# Patient Record
Sex: MALE | Race: ASIAN | Employment: OTHER | URBAN - METROPOLITAN AREA
[De-identification: names, ages, dates, MRNs, and addresses within clinical notes are randomized per-mention and may not be internally consistent; named-entity substitution may affect disease eponyms.]

---

## 2017-01-03 PROBLEM — R73.03 PRE-DIABETES: Status: ACTIVE | Noted: 2017-01-03

## 2017-01-03 PROBLEM — I51.89 DIASTOLIC DYSFUNCTION: Status: ACTIVE | Noted: 2017-01-03

## 2017-01-03 PROBLEM — E55.9 HYPOVITAMINOSIS D: Status: ACTIVE | Noted: 2017-01-03

## 2017-01-03 PROBLEM — E03.9 HYPOTHYROIDISM: Status: ACTIVE | Noted: 2017-01-03

## 2017-01-03 PROBLEM — I45.10 INCOMPLETE RIGHT BUNDLE BRANCH BLOCK: Status: ACTIVE | Noted: 2017-01-03

## 2017-01-13 RX ORDER — LEVOTHYROXINE SODIUM 112 UG/1
112 TABLET ORAL DAILY
Qty: 90 TABLET | Refills: 3 | Status: SHIPPED | OUTPATIENT
Start: 2017-01-13 | End: 2017-09-19

## 2017-01-13 NOTE — TELEPHONE ENCOUNTER
Thyroid Supplements Protocol Failed1/11 10:56 AM   TSH test in past 12 months    TSH value between 0.350 and 5.500 IU/ml     Requesting Levothyroxine   LOV: 12/9/16  RTC: 3m   Last Labs: 5/19/16 per media, all thyroid levels within normal limits   Filled:

## 2017-02-01 ENCOUNTER — MED REC SCAN ONLY (OUTPATIENT)
Dept: FAMILY MEDICINE CLINIC | Facility: CLINIC | Age: 62
End: 2017-02-01

## 2017-03-07 ENCOUNTER — OFFICE VISIT (OUTPATIENT)
Dept: FAMILY MEDICINE CLINIC | Facility: CLINIC | Age: 62
End: 2017-03-07

## 2017-03-07 VITALS
BODY MASS INDEX: 40.98 KG/M2 | SYSTOLIC BLOOD PRESSURE: 120 MMHG | DIASTOLIC BLOOD PRESSURE: 82 MMHG | RESPIRATION RATE: 16 BRPM | HEIGHT: 66 IN | HEART RATE: 74 BPM | WEIGHT: 255 LBS | TEMPERATURE: 98 F

## 2017-03-07 DIAGNOSIS — K21.9 GASTROESOPHAGEAL REFLUX DISEASE WITHOUT ESOPHAGITIS: ICD-10-CM

## 2017-03-07 DIAGNOSIS — R73.03 PRE-DIABETES: Primary | ICD-10-CM

## 2017-03-07 DIAGNOSIS — E55.9 HYPOVITAMINOSIS D: ICD-10-CM

## 2017-03-07 DIAGNOSIS — E03.9 HYPOTHYROIDISM, UNSPECIFIED TYPE: ICD-10-CM

## 2017-03-07 DIAGNOSIS — K59.00 CONSTIPATION, UNSPECIFIED CONSTIPATION TYPE: ICD-10-CM

## 2017-03-07 PROCEDURE — 99214 OFFICE O/P EST MOD 30 MIN: CPT | Performed by: INTERNAL MEDICINE

## 2017-03-07 RX ORDER — RANITIDINE 150 MG/1
150 TABLET ORAL 2 TIMES DAILY
Qty: 60 TABLET | Refills: 5 | Status: SHIPPED | OUTPATIENT
Start: 2017-03-07 | End: 2020-12-31

## 2017-03-07 NOTE — PATIENT INSTRUCTIONS
Thank you for choosing Lizette Coleman MD at [place] [address]  To Do: Arron Westfall  1.  Follow up for blood work on Friday     • Please signup for [facility] CHART, which is electronic access to your record if you have not done so.  All your results will post allow our office 5 business days to contact you regarding any testing results. Refill policies:   Allow 3 business days for refills; controlled substances may take longer and must be picked up from the office in person.   Narcotic medications can only be

## 2017-03-07 NOTE — PROGRESS NOTES
[facility] Internal Medicine Office Note  Chief Complaint:   Patient presents with:  Lab Results: Pt declined flu shot today  Test Results      HPI:   This is a 62year old male coming in for recheck for Vit D and A1c    He has gas pains on the r Conjunctivae are normal.   Neck: Neck supple. Cardiovascular: Normal rate, regular rhythm and normal heart sounds. Pulmonary/Chest: Effort normal and breath sounds normal.   Neurological: He is alert.        ASSESSMENT AND PLAN:   Sallye Spurling is a Outcome: Patient verbalizes understanding. Patient is notified to call with any questions, complications, allergies, or worsening or changing symptoms. Patient is to call with any side effects or complications from the treatments as a result of today.

## 2017-03-10 ENCOUNTER — APPOINTMENT (OUTPATIENT)
Dept: LAB | Age: 62
End: 2017-03-10
Attending: INTERNAL MEDICINE
Payer: COMMERCIAL

## 2017-03-10 DIAGNOSIS — E03.9 HYPOTHYROIDISM, UNSPECIFIED TYPE: ICD-10-CM

## 2017-03-10 DIAGNOSIS — R73.03 PRE-DIABETES: ICD-10-CM

## 2017-03-10 DIAGNOSIS — E55.9 HYPOVITAMINOSIS D: ICD-10-CM

## 2017-03-10 LAB
25-HYDROXYVITAMIN D (TOTAL): 27.3 NG/ML (ref 30–100)
EST. AVERAGE GLUCOSE BLD GHB EST-MCNC: 123 MG/DL (ref 68–126)
HBA1C MFR BLD HPLC: 5.9 % (ref ?–5.7)
TSI SER-ACNC: 1.64 MIU/ML (ref 0.35–5.5)

## 2017-03-10 PROCEDURE — 36415 COLL VENOUS BLD VENIPUNCTURE: CPT

## 2017-03-10 PROCEDURE — 83036 HEMOGLOBIN GLYCOSYLATED A1C: CPT

## 2017-03-10 PROCEDURE — 84443 ASSAY THYROID STIM HORMONE: CPT

## 2017-03-10 PROCEDURE — 82306 VITAMIN D 25 HYDROXY: CPT

## 2017-03-23 ENCOUNTER — TELEPHONE (OUTPATIENT)
Dept: FAMILY MEDICINE CLINIC | Facility: CLINIC | Age: 62
End: 2017-03-23

## 2017-03-23 NOTE — TELEPHONE ENCOUNTER
Entered by Zana Cardoso MD at 3/11/2017 12:56 PM        Read by Esthela Mccormick at 3/11/2017  9:37 PM       Vitamin D is much improved - take vitamin D 2000 units once a day (is over the counter)   Thyroid is at goal         See results below.  Spoke with

## 2017-05-31 ENCOUNTER — OFFICE VISIT (OUTPATIENT)
Dept: FAMILY MEDICINE CLINIC | Facility: CLINIC | Age: 62
End: 2017-05-31

## 2017-05-31 VITALS
HEIGHT: 66 IN | RESPIRATION RATE: 16 BRPM | OXYGEN SATURATION: 98 % | HEART RATE: 72 BPM | DIASTOLIC BLOOD PRESSURE: 80 MMHG | WEIGHT: 253.19 LBS | BODY MASS INDEX: 40.69 KG/M2 | TEMPERATURE: 98 F | SYSTOLIC BLOOD PRESSURE: 138 MMHG

## 2017-05-31 DIAGNOSIS — R06.89 GASPING FOR BREATH: ICD-10-CM

## 2017-05-31 DIAGNOSIS — R06.81 APNEA: Primary | ICD-10-CM

## 2017-05-31 DIAGNOSIS — Z87.09 HISTORY OF ENLARGED TONSILS: ICD-10-CM

## 2017-05-31 PROCEDURE — 99213 OFFICE O/P EST LOW 20 MIN: CPT | Performed by: NURSE PRACTITIONER

## 2017-05-31 NOTE — PATIENT INSTRUCTIONS
Thank you for choosing SHELTON Acevedo at [place] [address]  To Do: Arron Westfall  1. Start taking steroid (medrol dose pack)--> as directed  2.  Schedule for sleep study and ENT (ear, nose and throat) specialist    • Please signup for MY CHART, strive to make you healthier and to improve your quality of life.     Referrals, and Radiology Information:    If your insurance requires a referral to a specialist, please allow 5 business days to process your referral request.    If SHELTON Arriaga

## 2017-05-31 NOTE — PROGRESS NOTES
[facility] Internal Medicine Office Note  Chief Complaint:   Patient presents with:  Breathing Problem: woke up in the middle of the night not being able to sleep, happen twice in one night.  Episode happened last night, has not happen before - Ox Tab Take 1 tablet (112 mcg total) by mouth daily. Disp: 90 tablet Rfl: 3         REVIEW OF SYSTEMS:   Review of Systems   Constitutional: Negative for fever, chills and fatigue.    HENT: Negative for congestion, ear pain, hoarse voice, postnasal drip, sore Referral - In Network    1. See detailed hpi  2. Medrol dose pack   3.  Referral for sleep study and ENT (had seen specialist at [facility] about 3 yrs ago (will request those medical records today)     No orders of the defined types were placed in this encount

## 2017-08-08 ENCOUNTER — OFFICE VISIT (OUTPATIENT)
Dept: FAMILY MEDICINE CLINIC | Facility: CLINIC | Age: 62
End: 2017-08-08

## 2017-08-08 ENCOUNTER — APPOINTMENT (OUTPATIENT)
Dept: LAB | Age: 62
End: 2017-08-08
Attending: INTERNAL MEDICINE
Payer: COMMERCIAL

## 2017-08-08 VITALS
WEIGHT: 253.31 LBS | DIASTOLIC BLOOD PRESSURE: 70 MMHG | RESPIRATION RATE: 16 BRPM | TEMPERATURE: 99 F | HEART RATE: 78 BPM | BODY MASS INDEX: 40.71 KG/M2 | HEIGHT: 66 IN | SYSTOLIC BLOOD PRESSURE: 122 MMHG

## 2017-08-08 DIAGNOSIS — R10.31 RIGHT LOWER QUADRANT PAIN: ICD-10-CM

## 2017-08-08 DIAGNOSIS — E55.9 HYPOVITAMINOSIS D: ICD-10-CM

## 2017-08-08 DIAGNOSIS — R73.03 PRE-DIABETES: ICD-10-CM

## 2017-08-08 DIAGNOSIS — E53.8 LOW VITAMIN B12 LEVEL: ICD-10-CM

## 2017-08-08 DIAGNOSIS — R42 LIGHTHEADEDNESS: ICD-10-CM

## 2017-08-08 DIAGNOSIS — Z12.5 SPECIAL SCREENING FOR MALIGNANT NEOPLASM OF PROSTATE: ICD-10-CM

## 2017-08-08 DIAGNOSIS — Z12.11 COLON CANCER SCREENING: ICD-10-CM

## 2017-08-08 DIAGNOSIS — E03.9 HYPOTHYROIDISM, UNSPECIFIED TYPE: Primary | ICD-10-CM

## 2017-08-08 DIAGNOSIS — E03.9 HYPOTHYROIDISM, UNSPECIFIED TYPE: ICD-10-CM

## 2017-08-08 LAB
25-HYDROXYVITAMIN D (TOTAL): 19.3 NG/ML (ref 30–100)
EST. AVERAGE GLUCOSE BLD GHB EST-MCNC: 134 MG/DL (ref 68–126)
FREE T4: 1.3 NG/DL (ref 0.9–1.8)
HAV AB SERPL IA-ACNC: 205 PG/ML (ref 193–986)
HBA1C MFR BLD HPLC: 6.3 % (ref ?–5.7)
PSA SERPL-MCNC: 0.87 NG/ML (ref 0.01–4)
TSI SER-ACNC: 1.12 MIU/ML (ref 0.35–5.5)

## 2017-08-08 PROCEDURE — 84153 ASSAY OF PSA TOTAL: CPT

## 2017-08-08 PROCEDURE — 83036 HEMOGLOBIN GLYCOSYLATED A1C: CPT

## 2017-08-08 PROCEDURE — 82607 VITAMIN B-12: CPT

## 2017-08-08 PROCEDURE — 84443 ASSAY THYROID STIM HORMONE: CPT

## 2017-08-08 PROCEDURE — 84439 ASSAY OF FREE THYROXINE: CPT

## 2017-08-08 PROCEDURE — 99214 OFFICE O/P EST MOD 30 MIN: CPT | Performed by: INTERNAL MEDICINE

## 2017-08-08 PROCEDURE — 82306 VITAMIN D 25 HYDROXY: CPT

## 2017-08-08 PROCEDURE — 36415 COLL VENOUS BLD VENIPUNCTURE: CPT

## 2017-08-08 PROCEDURE — 93000 ELECTROCARDIOGRAM COMPLETE: CPT | Performed by: INTERNAL MEDICINE

## 2017-08-08 NOTE — PROGRESS NOTES
[facility] Internal Medicine Office Note  Chief Complaint:   Patient presents with: Insomnia  Abdominal Pain: Right side - last 2 weeks      HPI:   This is a 65year old male coming in for right sided pain of abdomen.    HPI  Present for past 2 w Known Allergies    Current Outpatient Prescriptions:  RaNITidine HCl 150 MG Oral Tab Take 1 tablet (150 mg total) by mouth 2 (two) times daily. Disp: 60 tablet Rfl: 5   Levothyroxine Sodium 112 MCG Oral Tab Take 1 tablet (112 mcg total) by mouth daily.  Dis Lymphadenopathy:     He has no cervical adenopathy. Neurological: He is alert. Psychiatric: He has a normal mood and affect.        ASSESSMENT AND PLAN:   Nitish Amanda is a 65year old male with  Hypothyroidism, unspecified type  (primary encounter Outcome: Patient verbalizes understanding. Patient is notified to call with any questions, complications, allergies, or worsening or changing symptoms. Patient is to call with any side effects or complications from the treatments as a result of today.

## 2017-08-08 NOTE — PATIENT INSTRUCTIONS
Thank you for choosing Rafita Tubbs MD at [place] [address]  To Do: Arron Westfall  1. Blood work today  2. Increase fiber in diet - take daily fiber supplement such as metamucil or benefiber   3. Start probiotic (refrigerated is better)  4.  Call toda result of today's visit.  For your safety, read the entire package insert of all medicines prescribed to you and be aware of all of the risks of treatment even beyond those discussed today.  All therapies have potential risk of harm or side effects or medi

## 2017-08-09 ENCOUNTER — TELEPHONE (OUTPATIENT)
Dept: FAMILY MEDICINE CLINIC | Facility: CLINIC | Age: 62
End: 2017-08-09

## 2017-08-09 RX ORDER — MOMETASONE FUROATE 1 MG/G
CREAM TOPICAL
Qty: 45 G | Refills: 0 | Status: SHIPPED | OUTPATIENT
Start: 2017-08-09 | End: 2018-05-22

## 2017-08-09 RX ORDER — ERGOCALCIFEROL 1.25 MG/1
50000 CAPSULE ORAL WEEKLY
Qty: 12 CAPSULE | Refills: 0 | Status: SHIPPED | OUTPATIENT
Start: 2017-08-09 | End: 2017-09-08

## 2017-08-09 NOTE — TELEPHONE ENCOUNTER
Patient reports he would like a refill on an ointment prescribed previously by previous PCP.  Patient does not have name of medication that he would like prescribed and said he does not think this is necessary information because she spoke with Dr. Ed masters

## 2017-08-09 NOTE — TELEPHONE ENCOUNTER
Prescription sent to pharmacy    Please inform patient that not all blood work comes back next day, but if it does, I do send results to [employer]. As RN explained to him, results cannot be released until MD reviews.      His blood work results are noted in m

## 2017-08-09 NOTE — TELEPHONE ENCOUNTER
Patient informed of MD recommendations, patient verbalized understanding with intent to comply. Offered opportunity to ask questions, all questions were answered.    Appt scheduled for B12 and 3mo f.u with Dr Manpreet Funez  Date Time Provider

## 2017-08-09 NOTE — TELEPHONE ENCOUNTER
Pt called states he was suppose to be prescribed an ointment pt states he checked with pharmacy and one was not sent in

## 2017-08-11 ENCOUNTER — TELEPHONE (OUTPATIENT)
Dept: FAMILY MEDICINE CLINIC | Facility: CLINIC | Age: 62
End: 2017-08-11

## 2017-08-11 DIAGNOSIS — IMO0001 CLASS 3 OBESITY WITH BODY MASS INDEX (BMI) OF 40.0 TO 44.9 IN ADULT, UNSPECIFIED OBESITY TYPE, UNSPECIFIED WHETHER SERIOUS COMORBIDITY PRESENT: ICD-10-CM

## 2017-08-11 DIAGNOSIS — R06.83 SNORING: Primary | ICD-10-CM

## 2017-08-11 PROBLEM — K57.30 DIVERTICULOSIS OF LARGE INTESTINE: Status: ACTIVE | Noted: 2017-08-11

## 2017-08-11 PROBLEM — K64.8 INTERNAL HEMORRHOIDS: Status: ACTIVE | Noted: 2017-08-11

## 2017-08-11 NOTE — TELEPHONE ENCOUNTER
Referral placed. Called center for sleep to obtain fax # [phone]. Form faxed back, confirmation.

## 2017-08-15 NOTE — TELEPHONE ENCOUNTER
Shanae from  office checking on status, states sleep must be entered on  [facility]. com online cannot except a fax for referral

## 2017-08-18 ENCOUNTER — NURSE ONLY (OUTPATIENT)
Dept: FAMILY MEDICINE CLINIC | Facility: CLINIC | Age: 62
End: 2017-08-18

## 2017-08-18 DIAGNOSIS — E53.8 VITAMIN B12 DEFICIENCY: Primary | ICD-10-CM

## 2017-08-18 PROCEDURE — 96372 THER/PROPH/DIAG INJ SC/IM: CPT | Performed by: INTERNAL MEDICINE

## 2017-08-18 RX ORDER — CYANOCOBALAMIN 1000 UG/ML
1000 INJECTION INTRAMUSCULAR; SUBCUTANEOUS ONCE
Status: COMPLETED | OUTPATIENT
Start: 2017-08-18 | End: 2017-08-18

## 2017-08-18 RX ADMIN — CYANOCOBALAMIN 1000 MCG: 1000 INJECTION INTRAMUSCULAR; SUBCUTANEOUS at 10:28:00

## 2017-08-18 NOTE — PROGRESS NOTES
Patient is here for Vitamin B12 injection #1. Given IM to left deltoid without incident. Your vitamin B12 level is low and you should start monthly B12 injections - please call office to set up nurse appointment for this.    Patient scheduled for 1st sh

## 2017-09-08 ENCOUNTER — TELEPHONE (OUTPATIENT)
Dept: FAMILY MEDICINE CLINIC | Facility: CLINIC | Age: 62
End: 2017-09-08

## 2017-09-08 NOTE — TELEPHONE ENCOUNTER
Spoke to pt provided Dr Urvashi Mota recommendations, verbalized understanding with intent to comply. Patient scheduled OV for next week.     Future Appointments  Date Time Provider Lelo Richards   9/13/2017 9:00 AM SHELTON Holguin [facility] [address]

## 2017-09-08 NOTE — TELEPHONE ENCOUNTER
Patient reports similar symptoms from when he came in to see Dr David Bean, it is going towards his lower back, right by his kidney area, pt reports pain varies. Pain gets better when laying down for a couple of mins. Pain: 6/10.  Denies pain when urinating, no

## 2017-09-08 NOTE — TELEPHONE ENCOUNTER
Would treat with high dose anti-inflammatory meds - take 2 naproxen otc twice daily with food.  F/u urgent care if symptoms persist and f/u next week

## 2017-09-12 ENCOUNTER — OFFICE VISIT (OUTPATIENT)
Dept: FAMILY MEDICINE CLINIC | Facility: CLINIC | Age: 62
End: 2017-09-12

## 2017-09-12 VITALS
HEIGHT: 66 IN | HEART RATE: 72 BPM | DIASTOLIC BLOOD PRESSURE: 80 MMHG | WEIGHT: 252 LBS | SYSTOLIC BLOOD PRESSURE: 132 MMHG | RESPIRATION RATE: 16 BRPM | BODY MASS INDEX: 40.5 KG/M2

## 2017-09-12 DIAGNOSIS — E53.8 B12 DEFICIENCY: ICD-10-CM

## 2017-09-12 DIAGNOSIS — M54.50 ACUTE BILATERAL LOW BACK PAIN WITHOUT SCIATICA: Primary | ICD-10-CM

## 2017-09-12 DIAGNOSIS — K40.90 LEFT INGUINAL HERNIA: ICD-10-CM

## 2017-09-12 DIAGNOSIS — R10.30 LOWER ABDOMINAL PAIN: ICD-10-CM

## 2017-09-12 LAB
APPEARANCE: CLEAR
BILIRUBIN: NEGATIVE
GLUCOSE (URINE DIPSTICK): NEGATIVE MG/DL
KETONES (URINE DIPSTICK): NEGATIVE MG/DL
LEUKOCYTES: NEGATIVE
MULTISTIX LOT#: NORMAL NUMERIC
NITRITE, URINE: NEGATIVE
OCCULT BLOOD: NEGATIVE
PH, URINE: 5.5 (ref 4.5–8)
PROTEIN (URINE DIPSTICK): NEGATIVE MG/DL
SPECIFIC GRAVITY: 1.01 (ref 1–1.03)
URINE-COLOR: YELLOW
UROBILINOGEN,SEMI-QN: 0.2 MG/DL (ref 0–1.9)

## 2017-09-12 PROCEDURE — 99213 OFFICE O/P EST LOW 20 MIN: CPT | Performed by: NURSE PRACTITIONER

## 2017-09-12 PROCEDURE — 96372 THER/PROPH/DIAG INJ SC/IM: CPT | Performed by: NURSE PRACTITIONER

## 2017-09-12 PROCEDURE — 87086 URINE CULTURE/COLONY COUNT: CPT | Performed by: NURSE PRACTITIONER

## 2017-09-12 PROCEDURE — 81003 URINALYSIS AUTO W/O SCOPE: CPT | Performed by: NURSE PRACTITIONER

## 2017-09-12 RX ORDER — TRAMADOL HYDROCHLORIDE 50 MG/1
50 TABLET ORAL EVERY 12 HOURS PRN
Qty: 20 TABLET | Refills: 0 | Status: SHIPPED | OUTPATIENT
Start: 2017-09-12 | End: 2017-10-30

## 2017-09-12 RX ORDER — CHOLECALCIFEROL (VITAMIN D3) 1250 MCG
1 CAPSULE ORAL WEEKLY
COMMUNITY
End: 2018-03-20

## 2017-09-12 RX ORDER — CYANOCOBALAMIN 1000 UG/ML
1000 INJECTION INTRAMUSCULAR; SUBCUTANEOUS ONCE
Status: COMPLETED | OUTPATIENT
Start: 2017-09-12 | End: 2017-09-12

## 2017-09-12 RX ORDER — DICLOFENAC SODIUM 75 MG/1
75 TABLET, DELAYED RELEASE ORAL 2 TIMES DAILY
Qty: 60 TABLET | Refills: 0 | Status: SHIPPED | OUTPATIENT
Start: 2017-09-12 | End: 2017-10-30

## 2017-09-12 RX ADMIN — CYANOCOBALAMIN 1000 MCG: 1000 INJECTION INTRAMUSCULAR; SUBCUTANEOUS at 13:14:00

## 2017-09-12 NOTE — PATIENT INSTRUCTIONS
Thank you for choosing SHELTON Wilson at [place] [address]  To Do: Arron Westfall  1.  Can take anti-inflamm (dicfofenac) as needed for pain 2 times per day   - for extreme pain can take tramadol (only 2 times per day)  -can take muscle relaxer a for info    • Please call our office about any questions regarding your treatment/medicines/tests as a result of today's visit.  For your safety, read the entire package insert of all medicines prescribed to you and be aware of all of the risks of treatmen

## 2017-09-12 NOTE — PROGRESS NOTES
[facility] Internal Medicine Office Note  Chief Complaint:   Patient presents with:  Low Back Pain  Imm/Inj: vitamin b12 / declines flu shot    HPI:   This is a 65year old male coming in for  HPI     C.o of back pain X 5 days   Reports that he w Mometasone Furoate 0.1 % External Cream Apply small amount to affected area once daily. Disp: 45 g Rfl: 0   RaNITidine HCl 150 MG Oral Tab Take 1 tablet (150 mg total) by mouth 2 (two) times daily.  Disp: 60 tablet Rfl: 5   Levothyroxine Sodium 112 MCG Oral Difficult exam r/t to body habitus    Musculoskeletal:        Lumbar back: He exhibits decreased range of motion and tenderness. He exhibits no swelling.         Back:    Decrease ROM  +pain w/ palpation at L4/L5   +strength, sensation, DTRs, pulse intact Sig: Take 1 tablet (75 mg total) by mouth 2 (two) times daily. TraMADol HCl 50 MG Oral Tab 20 tablet 0      Sig: Take 1 tablet (50 mg total) by mouth every 12 (twelve) hours as needed for Pain.          Imaging & Consults:  SURGERY - INTERNAL  OP RE

## 2017-09-15 ENCOUNTER — OFFICE VISIT (OUTPATIENT)
Dept: PHYSICAL THERAPY | Age: 62
End: 2017-09-15
Attending: NURSE PRACTITIONER
Payer: COMMERCIAL

## 2017-09-15 DIAGNOSIS — R10.30 LOWER ABDOMINAL PAIN: ICD-10-CM

## 2017-09-15 PROCEDURE — 97161 PT EVAL LOW COMPLEX 20 MIN: CPT | Performed by: PHYSICAL THERAPIST

## 2017-09-15 PROCEDURE — 97140 MANUAL THERAPY 1/> REGIONS: CPT | Performed by: PHYSICAL THERAPIST

## 2017-09-15 PROCEDURE — 97110 THERAPEUTIC EXERCISES: CPT | Performed by: PHYSICAL THERAPIST

## 2017-09-15 NOTE — PROGRESS NOTES
SPINE EVALUATION:   Referring Physician: Sylvan Fleischer APN  Diagnosis: Acute bilateral low back pain without sciatica   Date of Service: 9/15/2017     PATIENT SUMMARY   Charlotte Sanchez is a 65year old y/o male who presents to therapy today with comp Observation/Posture: abdominal obesity, decreased lumbar lordosis,   Gait: upright posture, short stride length, mild antalgia on right  Neuro Screen: Myotomes and dermatomes intact    Lumbar ROM:   Flexion: 75% limitation with pain  Extension: full pain absence of radicular symptoms for 3 consecutive days to improve function with ADL (8 visits)  · Pt will have decreased paraspinal mm tension for improved lumbar mobility to tolerate standing >60 minutes for work and home activities (8 visits)  · Pt will de

## 2017-09-18 ENCOUNTER — TELEPHONE (OUTPATIENT)
Dept: FAMILY MEDICINE CLINIC | Facility: CLINIC | Age: 62
End: 2017-09-18

## 2017-09-18 RX ORDER — METHOCARBAMOL 750 MG/1
TABLET, FILM COATED ORAL
Qty: 20 TABLET | Refills: 0 | Status: SHIPPED | OUTPATIENT
Start: 2017-09-18 | End: 2017-10-30

## 2017-09-18 NOTE — TELEPHONE ENCOUNTER
Dr. David Bean- Patient was seen by Sammy Olivares on 09/12/2017 for back pain. He stated that he is still having back pain and would like to speak with a nurse. Please advise.

## 2017-09-18 NOTE — TELEPHONE ENCOUNTER
Time started: 414    Time ended: 455    Total time spent on chart: 41 min    Patient said Dr. Sheffield Ormond advised getting scan of his hernia before seeing Dr. Juan Hare and he is looking for the muscle relaxer he was suppose to get.   Per Minh Birmingham call in [facility]

## 2017-09-19 ENCOUNTER — APPOINTMENT (OUTPATIENT)
Dept: PHYSICAL THERAPY | Age: 62
End: 2017-09-19
Attending: NURSE PRACTITIONER
Payer: COMMERCIAL

## 2017-09-19 NOTE — TELEPHONE ENCOUNTER
Patient states that he is completely out of his levothyroxine and his mail order pharm is just processing a refill now. It will take 7-14 days for his med to come in the mail. He is asking for us to call in a 1 month supply to his local [facility].     On

## 2017-09-20 RX ORDER — LEVOTHYROXINE SODIUM 112 UG/1
112 TABLET ORAL
Qty: 90 TABLET | Refills: 3 | Status: SHIPPED | OUTPATIENT
Start: 2017-09-20 | End: 2018-03-21

## 2017-09-21 ENCOUNTER — APPOINTMENT (OUTPATIENT)
Dept: PHYSICAL THERAPY | Age: 62
End: 2017-09-21
Attending: NURSE PRACTITIONER
Payer: COMMERCIAL

## 2017-09-25 ENCOUNTER — TELEPHONE (OUTPATIENT)
Dept: FAMILY MEDICINE CLINIC | Facility: CLINIC | Age: 62
End: 2017-09-25

## 2017-09-25 DIAGNOSIS — M54.5 LOW BACK PAIN, UNSPECIFIED BACK PAIN LATERALITY, UNSPECIFIED CHRONICITY, WITH SCIATICA PRESENCE UNSPECIFIED: Primary | ICD-10-CM

## 2017-09-25 NOTE — TELEPHONE ENCOUNTER
Patient reports he was seen for low back pain with Stefano Beach on 9/12/17. Per OV notes:  4. Referral to PT  5.  Diclofenac and tramadol (given for extreme pain)  -     OP REFERRAL TO [facility]      Patient reports PT is not helpful

## 2017-09-26 ENCOUNTER — OFFICE VISIT (OUTPATIENT)
Dept: PHYSICAL THERAPY | Age: 62
End: 2017-09-26
Attending: NURSE PRACTITIONER
Payer: COMMERCIAL

## 2017-09-26 DIAGNOSIS — R10.30 LOWER ABDOMINAL PAIN: ICD-10-CM

## 2017-09-26 PROCEDURE — 97110 THERAPEUTIC EXERCISES: CPT | Performed by: PHYSICAL THERAPIST

## 2017-09-26 PROCEDURE — 97140 MANUAL THERAPY 1/> REGIONS: CPT | Performed by: PHYSICAL THERAPIST

## 2017-09-26 NOTE — PROGRESS NOTES
Dx:  Acute bilateral low back pain without sciatica           Authorized # of Visits:  6         Next MD visit: 10/5              Subjective: Arron returns for second visit. He apologizes for missing all last week.  He states he was in a great deal of pain consecutive days to improve function with ADL (8 visits)  · Pt will have decreased paraspinal mm tension for improved lumbar mobility to tolerate standing >60 minutes for work and home activities (8 visits)  · Pt will demonstrate improved core strength to

## 2017-09-26 NOTE — TELEPHONE ENCOUNTER
Attempted to contact pt to inform of referral being signed, detailed message left for pt, advised to contact us with any questions or concerns.     Time started: 3:13 pm  Time ended: 3:14 pm    Total time spent on chart: 1 min

## 2017-09-27 ENCOUNTER — TELEPHONE (OUTPATIENT)
Dept: FAMILY MEDICINE CLINIC | Facility: CLINIC | Age: 62
End: 2017-09-27

## 2017-09-27 NOTE — TELEPHONE ENCOUNTER
Good Morning,      Per [facility] Dr Jose Russ is not in network with the patient's policy. I've tried multiple chiropractors(Alex,Brenda,Jhoan,and Dwight) and none are in network. Does physician have another recommendation for patient?        Thanks,   Vaishnavi Garland

## 2017-09-27 NOTE — TELEPHONE ENCOUNTER
Patient notified, verbalized understanding. Patient will call and request information for an  In network physician.     Time started: 1026    Time ended: 1030    Total time spent on chart: 4 min

## 2017-09-27 NOTE — TELEPHONE ENCOUNTER
LMOM for patient to call back and discuss.      Time started: 9646    Time ended: 0948    Total time spent on chart: 1 min

## 2017-09-27 NOTE — TELEPHONE ENCOUNTER
Prisca Delong << Less Detail',event)\" href=\"javascript:;\">More Detail >>      Prisca Delong   Sent: Wed September 27, 2017  2:52 PM   To: [facility] Clinical Staff                  Message     Hello,      After calling [facility] again they were able to

## 2017-09-28 ENCOUNTER — APPOINTMENT (OUTPATIENT)
Dept: PHYSICAL THERAPY | Age: 62
End: 2017-09-28
Attending: NURSE PRACTITIONER
Payer: COMMERCIAL

## 2017-10-03 ENCOUNTER — OFFICE VISIT (OUTPATIENT)
Dept: SURGERY | Facility: CLINIC | Age: 62
End: 2017-10-03

## 2017-10-03 VITALS — HEIGHT: 66 IN | WEIGHT: 252 LBS | BODY MASS INDEX: 40.5 KG/M2 | RESPIRATION RATE: 18 BRPM

## 2017-10-03 DIAGNOSIS — R10.31 RLQ ABDOMINAL PAIN: Primary | ICD-10-CM

## 2017-10-03 PROCEDURE — 99243 OFF/OP CNSLTJ NEW/EST LOW 30: CPT | Performed by: SURGERY

## 2017-10-03 NOTE — H&P
New Patient Visit Note       Active Problems      1. RLQ abdominal pain        Chief Complaint   Patient presents with:  Hernia: New patient- hernia consultation. no recent testing. has not had hernia in the past. states is more in the lower abdomen area. Smokeless tobacco: Never Used                        Alcohol use:  No                 Current Outpatient Prescriptions:  Levothyroxine Sodium 112 MCG Oral Tab Take 1 tablet (112 mcg total) by mouth before breakfast. Sulma Hoyos Physical Findings   Resp 18   Ht 66\"   Wt 252 lb   BMI 40.67 kg/m²   Physical Exam   Constitutional: He is oriented to person, place, and time. He appears well-developed and well-nourished. No distress. HENT:   Head: Normocephalic and atraumatic. (around 10/17/2017).     Belen Diana MD

## 2017-10-09 ENCOUNTER — HOSPITAL ENCOUNTER (OUTPATIENT)
Dept: CT IMAGING | Age: 62
Discharge: HOME | End: 2017-10-09
Attending: SURGERY
Payer: COMMERCIAL

## 2017-10-09 DIAGNOSIS — R10.31 RLQ ABDOMINAL PAIN: ICD-10-CM

## 2017-10-09 PROCEDURE — 74176 CT ABD & PELVIS W/O CONTRAST: CPT | Performed by: SURGERY

## 2017-10-10 ENCOUNTER — TELEPHONE (OUTPATIENT)
Dept: SURGERY | Facility: CLINIC | Age: 62
End: 2017-10-10

## 2017-10-10 NOTE — TELEPHONE ENCOUNTER
Notified patient of CT results (hernia) per Dr. Brandt Mcbride. Made appointment for 10/17/17 to further discuss results.

## 2017-10-17 ENCOUNTER — OFFICE VISIT (OUTPATIENT)
Dept: SURGERY | Facility: CLINIC | Age: 62
End: 2017-10-17

## 2017-10-17 VITALS — RESPIRATION RATE: 18 BRPM | WEIGHT: 252 LBS | HEIGHT: 66 IN | BODY MASS INDEX: 40.5 KG/M2

## 2017-10-17 DIAGNOSIS — K40.90 LEFT INGUINAL HERNIA: Primary | ICD-10-CM

## 2017-10-17 PROCEDURE — 99212 OFFICE O/P EST SF 10 MIN: CPT | Performed by: SURGERY

## 2017-10-17 NOTE — PROGRESS NOTES
Follow Up Visit Note       Active Problems      1. Left inguinal hernia          Chief Complaint   Patient presents with: Follow - Up: follow up CT ABDOM[facility]+PELVIS @ [facility]. occasional abdominal pain.          History of Present Illness    The patient presents Smokeless tobacco: Never Used                        Alcohol use:  No                 Current Outpatient Prescriptions:  Levothyroxine Sodium 112 MCG Oral Tab Take 1 tablet (112 mcg total) by mouth before breakfast. Disp: 90 tablet Rfl: 3   methocarbamol 75 Wt 252 lb   BMI 40.67 kg/m²   Physical Exam   No physical examination performed today. This was a counseling session regarding CT scan findings.     CT ABDOMEN+PELVIS(CPT=74176)     COMPARISON:  [facility], CT ABDOMEN+PELVIS KIDNEYSTONE 2D RNDR(NO IV,NO O on 10/09/2017 at 11:09       Approved by: Mark Amin MD              Assessment   Left inguinal hernia  (primary encounter diagnosis)    Plan     · The patient has a small fat-containing umbilical hernia and a small fat-containing left inguinal rusty

## 2017-10-27 ENCOUNTER — TELEPHONE (OUTPATIENT)
Dept: SURGERY | Facility: CLINIC | Age: 62
End: 2017-10-27

## 2017-10-27 DIAGNOSIS — K40.90 NON-RECURRENT UNILATERAL INGUINAL HERNIA WITHOUT OBSTRUCTION OR GANGRENE: Primary | ICD-10-CM

## 2017-10-31 ENCOUNTER — NURSE ONLY (OUTPATIENT)
Dept: FAMILY MEDICINE CLINIC | Facility: CLINIC | Age: 62
End: 2017-10-31

## 2017-10-31 DIAGNOSIS — E53.8 VITAMIN B12 DEFICIENCY: ICD-10-CM

## 2017-10-31 PROCEDURE — 96372 THER/PROPH/DIAG INJ SC/IM: CPT | Performed by: INTERNAL MEDICINE

## 2017-10-31 RX ORDER — CYANOCOBALAMIN 1000 UG/ML
1000 INJECTION INTRAMUSCULAR; SUBCUTANEOUS
Status: SHIPPED | OUTPATIENT
Start: 2017-10-31 | End: 2018-02-28

## 2017-10-31 RX ADMIN — CYANOCOBALAMIN 1000 MCG: 1000 INJECTION INTRAMUSCULAR; SUBCUTANEOUS at 11:16:00

## 2017-10-31 NOTE — PROGRESS NOTES
Notes Recorded by Peter Adhikari MD on 8/9/2017 at 12:37 PM CDT  Your vitamin B12 level is low and you should start monthly B12 injections - please call office to set up nurse appointment for this.     Thyroid results are at goal.    Vitamin D is low and re

## 2017-11-02 ENCOUNTER — OFFICE VISIT (OUTPATIENT)
Dept: FAMILY MEDICINE CLINIC | Facility: CLINIC | Age: 62
End: 2017-11-02

## 2017-11-02 ENCOUNTER — TELEPHONE (OUTPATIENT)
Dept: SURGERY | Facility: CLINIC | Age: 62
End: 2017-11-02

## 2017-11-02 VITALS
WEIGHT: 250 LBS | DIASTOLIC BLOOD PRESSURE: 78 MMHG | SYSTOLIC BLOOD PRESSURE: 124 MMHG | TEMPERATURE: 99 F | RESPIRATION RATE: 16 BRPM | BODY MASS INDEX: 40.18 KG/M2 | HEART RATE: 84 BPM | HEIGHT: 66 IN

## 2017-11-02 DIAGNOSIS — R05.9 COUGH: ICD-10-CM

## 2017-11-02 DIAGNOSIS — R50.9 FEVER, UNSPECIFIED FEVER CAUSE: Primary | ICD-10-CM

## 2017-11-02 DIAGNOSIS — J34.89 SINUS PRESSURE: ICD-10-CM

## 2017-11-02 PROCEDURE — 99213 OFFICE O/P EST LOW 20 MIN: CPT | Performed by: FAMILY MEDICINE

## 2017-11-02 PROCEDURE — 87502 INFLUENZA DNA AMP PROBE: CPT | Performed by: FAMILY MEDICINE

## 2017-11-02 PROCEDURE — 87798 DETECT AGENT NOS DNA AMP: CPT | Performed by: FAMILY MEDICINE

## 2017-11-02 RX ORDER — AMOXICILLIN AND CLAVULANATE POTASSIUM 875; 125 MG/1; MG/1
1 TABLET, FILM COATED ORAL 2 TIMES DAILY
Qty: 20 TABLET | Refills: 0 | Status: SHIPPED | OUTPATIENT
Start: 2017-11-02 | End: 2017-11-12

## 2017-11-02 NOTE — PROGRESS NOTES
[facility] Visit Note  11/2/2017      Subjective:      Patient ID: Miles Hoang is a 65year old male.     Chief Complaint:  Patient presents with:  Cough: x 1 week      HPI:  Miles Hoang is a 65year old male who is being seen today for th Clavulanate 875-125 MG Oral Tab; Take 1 tablet by mouth 2 (two) times daily. Dispense: 20 tablet; Refill: 0    Diff dx: influenza, sinusitis, viral uri, vs other.  -cont delsym, tylenol prn pain/fever, and start augmentin for now.  We will call with result

## 2017-11-03 ENCOUNTER — TELEPHONE (OUTPATIENT)
Dept: SURGERY | Facility: CLINIC | Age: 62
End: 2017-11-03

## 2017-11-03 DIAGNOSIS — K40.90 NON-RECURRENT INGUINAL HERNIA WITHOUT OBSTRUCTION OR GANGRENE, UNSPECIFIED LATERALITY: Primary | ICD-10-CM

## 2017-11-14 ENCOUNTER — HOSPITAL ENCOUNTER (OUTPATIENT)
Dept: GENERAL RADIOLOGY | Age: 62
Discharge: HOME | End: 2017-11-14
Attending: INTERNAL MEDICINE
Payer: COMMERCIAL

## 2017-11-14 ENCOUNTER — OFFICE VISIT (OUTPATIENT)
Dept: FAMILY MEDICINE CLINIC | Facility: CLINIC | Age: 62
End: 2017-11-14

## 2017-11-14 VITALS
BODY MASS INDEX: 41.3 KG/M2 | RESPIRATION RATE: 16 BRPM | TEMPERATURE: 97 F | HEART RATE: 76 BPM | SYSTOLIC BLOOD PRESSURE: 126 MMHG | DIASTOLIC BLOOD PRESSURE: 74 MMHG | HEIGHT: 66 IN | WEIGHT: 257 LBS

## 2017-11-14 DIAGNOSIS — R05.9 COUGH: ICD-10-CM

## 2017-11-14 DIAGNOSIS — R73.03 PRE-DIABETES: Primary | ICD-10-CM

## 2017-11-14 DIAGNOSIS — E03.9 HYPOTHYROIDISM, UNSPECIFIED TYPE: ICD-10-CM

## 2017-11-14 DIAGNOSIS — E55.9 HYPOVITAMINOSIS D: ICD-10-CM

## 2017-11-14 DIAGNOSIS — E53.8 VITAMIN B12 DEFICIENCY: ICD-10-CM

## 2017-11-14 PROCEDURE — 83036 HEMOGLOBIN GLYCOSYLATED A1C: CPT | Performed by: INTERNAL MEDICINE

## 2017-11-14 PROCEDURE — 99214 OFFICE O/P EST MOD 30 MIN: CPT | Performed by: INTERNAL MEDICINE

## 2017-11-14 PROCEDURE — 71020 XR CHEST PA + LAT CHEST (CPT=71020): CPT | Performed by: INTERNAL MEDICINE

## 2017-11-14 NOTE — PATIENT INSTRUCTIONS
Thank you for choosing Gilberto Santos MD at [place] [address]  To Do: Arron Westfall  1. Chest X-ray today  Delsym and mucinex ok for cough  2. Check vitamin D blood work  3.  Decrease carbohydrates and sugar in diet - decrease bread, rice, pasta, potato treatment/medicines/tests as a result of today's visit.  For your safety, read the entire package insert of all medicines prescribed to you and be aware of all of the risks of treatment even beyond those discussed today.  All therapies have potential risk

## 2017-11-14 NOTE — PROGRESS NOTES
[facility] Internal Medicine Office Note  Chief Complaint:   Patient presents with:  Cough: x 2 weeks  Lab: due for Hgb A1C today      HPI:   This is a 65year old male coming in for cough  HPI  Present for the past few weeks  Took course of augconstantin (Patient taking differently: as needed. Apply small amount to affected area once daily. ) Disp: 45 g Rfl: 0   RaNITidine HCl 150 MG Oral Tab Take 1 tablet (150 mg total) by mouth 2 (two) times daily.  (Patient taking differently: Take 150 mg by mouth as nee this visit:    Pre-diabetes - check a1c. Decrease carbohydrates in diet  -     HEMOGLOBIN A1C    Cough - c/w viral syndrome; will check chest x-ray to r/o pna. Delsym/mucinex  -     XR CHEST PA + LAT CHEST (CPT=71020);  Future    Vitamin B12 deficiency - co

## 2017-11-15 ENCOUNTER — APPOINTMENT (OUTPATIENT)
Dept: LAB | Age: 62
End: 2017-11-15
Attending: INTERNAL MEDICINE
Payer: COMMERCIAL

## 2017-11-15 DIAGNOSIS — E55.9 HYPOVITAMINOSIS D: ICD-10-CM

## 2017-11-15 PROCEDURE — 82306 VITAMIN D 25 HYDROXY: CPT

## 2017-11-15 PROCEDURE — 36415 COLL VENOUS BLD VENIPUNCTURE: CPT

## 2017-12-05 ENCOUNTER — NURSE ONLY (OUTPATIENT)
Dept: FAMILY MEDICINE CLINIC | Facility: CLINIC | Age: 62
End: 2017-12-05

## 2017-12-05 ENCOUNTER — TELEPHONE (OUTPATIENT)
Dept: FAMILY MEDICINE CLINIC | Facility: CLINIC | Age: 62
End: 2017-12-05

## 2017-12-05 DIAGNOSIS — E53.8 B12 DEFICIENCY: ICD-10-CM

## 2017-12-05 PROCEDURE — 96372 THER/PROPH/DIAG INJ SC/IM: CPT | Performed by: INTERNAL MEDICINE

## 2017-12-05 RX ADMIN — CYANOCOBALAMIN 1000 MCG: 1000 INJECTION INTRAMUSCULAR; SUBCUTANEOUS at 11:17:00

## 2017-12-05 NOTE — TELEPHONE ENCOUNTER
Patient was here for his Vitamin B12 shot and wanted to discuss a problem he is having. He is scheduled for right inguinal hernia repair soon but noticed a lump in the RUQ that is palpable and painful. He notices it after sitting for a while.   If the are

## 2017-12-05 NOTE — TELEPHONE ENCOUNTER
It may be related to the hernia if it is on the same side, but impossible to say without looking at it.   If the skin is red or has rash, this should be looked at urgently

## 2017-12-05 NOTE — TELEPHONE ENCOUNTER
Pt stated that the lump is palpable only after a long day at work. Denies redness, rash, abdominal pain, but states that when lump appears he feels a burning sensation. I offered pt an appointment to be seen today and tomorrow with Rolf Bradley.  Pt st

## 2017-12-07 ENCOUNTER — HOSPITAL ENCOUNTER (OUTPATIENT)
Facility: HOSPITAL | Age: 62
Setting detail: HOSPITAL OUTPATIENT SURGERY
Discharge: HOME | End: 2017-12-07
Attending: SURGERY | Admitting: SURGERY
Payer: COMMERCIAL

## 2017-12-07 ENCOUNTER — SURGERY (OUTPATIENT)
Age: 62
End: 2017-12-07

## 2017-12-07 ENCOUNTER — ANESTHESIA EVENT (OUTPATIENT)
Dept: SURGERY | Facility: HOSPITAL | Age: 62
End: 2017-12-07
Payer: COMMERCIAL

## 2017-12-07 ENCOUNTER — ANESTHESIA (OUTPATIENT)
Dept: SURGERY | Facility: HOSPITAL | Age: 62
End: 2017-12-07
Payer: COMMERCIAL

## 2017-12-07 VITALS
OXYGEN SATURATION: 99 % | WEIGHT: 250 LBS | HEIGHT: 66 IN | HEART RATE: 79 BPM | TEMPERATURE: 99 F | SYSTOLIC BLOOD PRESSURE: 126 MMHG | DIASTOLIC BLOOD PRESSURE: 89 MMHG | BODY MASS INDEX: 40.18 KG/M2 | RESPIRATION RATE: 18 BRPM

## 2017-12-07 DIAGNOSIS — K40.90 NON-RECURRENT UNILATERAL INGUINAL HERNIA WITHOUT OBSTRUCTION OR GANGRENE: ICD-10-CM

## 2017-12-07 PROCEDURE — 0YUA4JZ SUPPLEMENT BILATERAL INGUINAL REGION WITH SYNTHETIC SUBSTITUTE, PERCUTANEOUS ENDOSCOPIC APPROACH: ICD-10-PCS | Performed by: SURGERY

## 2017-12-07 PROCEDURE — 0WQF0ZZ REPAIR ABDOMINAL WALL, OPEN APPROACH: ICD-10-PCS | Performed by: SURGERY

## 2017-12-07 PROCEDURE — 8E0W4CZ ROBOTIC ASSISTED PROCEDURE OF TRUNK REGION, PERCUTANEOUS ENDOSCOPIC APPROACH: ICD-10-PCS | Performed by: SURGERY

## 2017-12-07 DEVICE — PROGRIP MESH: Type: IMPLANTABLE DEVICE | Site: GROIN | Status: FUNCTIONAL

## 2017-12-07 RX ORDER — HEPARIN SODIUM 5000 [USP'U]/ML
5000 INJECTION, SOLUTION INTRAVENOUS; SUBCUTANEOUS ONCE
Status: COMPLETED | OUTPATIENT
Start: 2017-12-07 | End: 2017-12-07

## 2017-12-07 RX ORDER — HYDROCODONE BITARTRATE AND ACETAMINOPHEN 5; 325 MG/1; MG/1
1-2 TABLET ORAL
Qty: 30 TABLET | Refills: 0 | Status: SHIPPED | OUTPATIENT
Start: 2017-12-07 | End: 2017-12-12

## 2017-12-07 RX ORDER — METOCLOPRAMIDE HYDROCHLORIDE 5 MG/ML
10 INJECTION INTRAMUSCULAR; INTRAVENOUS AS NEEDED
Status: DISCONTINUED | OUTPATIENT
Start: 2017-12-07 | End: 2017-12-07

## 2017-12-07 RX ORDER — HYDROMORPHONE HYDROCHLORIDE 1 MG/ML
0.4 INJECTION, SOLUTION INTRAMUSCULAR; INTRAVENOUS; SUBCUTANEOUS EVERY 5 MIN PRN
Status: DISCONTINUED | OUTPATIENT
Start: 2017-12-07 | End: 2017-12-07

## 2017-12-07 RX ORDER — ASPIRIN 81 MG
100 TABLET, DELAYED RELEASE (ENTERIC COATED) ORAL DAILY
Qty: 10 TABLET | Refills: 0 | Status: SHIPPED | OUTPATIENT
Start: 2017-12-07

## 2017-12-07 RX ORDER — BUPIVACAINE HYDROCHLORIDE AND EPINEPHRINE 2.5; 5 MG/ML; UG/ML
INJECTION, SOLUTION EPIDURAL; INFILTRATION; INTRACAUDAL; PERINEURAL AS NEEDED
Status: DISCONTINUED | OUTPATIENT
Start: 2017-12-07 | End: 2017-12-07 | Stop reason: HOSPADM

## 2017-12-07 RX ORDER — NALOXONE HYDROCHLORIDE 0.4 MG/ML
80 INJECTION, SOLUTION INTRAMUSCULAR; INTRAVENOUS; SUBCUTANEOUS AS NEEDED
Status: DISCONTINUED | OUTPATIENT
Start: 2017-12-07 | End: 2017-12-07

## 2017-12-07 RX ORDER — CEFAZOLIN SODIUM 1 G/3ML
INJECTION, POWDER, FOR SOLUTION INTRAMUSCULAR; INTRAVENOUS
Status: DISCONTINUED | OUTPATIENT
Start: 2017-12-07 | End: 2017-12-07 | Stop reason: HOSPADM

## 2017-12-07 RX ORDER — ONDANSETRON 2 MG/ML
4 INJECTION INTRAMUSCULAR; INTRAVENOUS AS NEEDED
Status: DISCONTINUED | OUTPATIENT
Start: 2017-12-07 | End: 2017-12-07

## 2017-12-07 RX ORDER — HYDROCODONE BITARTRATE AND ACETAMINOPHEN 5; 325 MG/1; MG/1
1 TABLET ORAL AS NEEDED
Status: COMPLETED | OUTPATIENT
Start: 2017-12-07 | End: 2017-12-07

## 2017-12-07 RX ORDER — HYDROCODONE BITARTRATE AND ACETAMINOPHEN 5; 325 MG/1; MG/1
2 TABLET ORAL AS NEEDED
Status: COMPLETED | OUTPATIENT
Start: 2017-12-07 | End: 2017-12-07

## 2017-12-07 RX ORDER — SODIUM CHLORIDE, SODIUM LACTATE, POTASSIUM CHLORIDE, CALCIUM CHLORIDE 600; 310; 30; 20 MG/100ML; MG/100ML; MG/100ML; MG/100ML
INJECTION, SOLUTION INTRAVENOUS CONTINUOUS
Status: DISCONTINUED | OUTPATIENT
Start: 2017-12-07 | End: 2017-12-07

## 2017-12-07 RX ORDER — HYDROMORPHONE HYDROCHLORIDE 1 MG/ML
INJECTION, SOLUTION INTRAMUSCULAR; INTRAVENOUS; SUBCUTANEOUS
Status: COMPLETED
Start: 2017-12-07 | End: 2017-12-07

## 2017-12-07 RX ORDER — CEFAZOLIN SODIUM/WATER 2 G/20 ML
2 SYRINGE (ML) INTRAVENOUS ONCE
Status: DISCONTINUED | OUTPATIENT
Start: 2017-12-07 | End: 2017-12-07 | Stop reason: HOSPADM

## 2017-12-07 NOTE — H&P
Active Problems      1. Left inguinal hernia          Chief Complaint   Patient presents with: Follow - Up: follow up CT ABDOMEN+PELVIS @ [facility].  occasional abdominal pain.            History of Present Illness     The patient presents for follow-up care and Smokeless tobacco: Never Used                        Alcohol use:  No                  Current Outpatient Prescriptions:  Levothyroxine Sodium 112 MCG Oral Tab Take 1 tablet (112 mcg total) by mouth before breakfast. Disp: 90 tablet Rfl: 3   methocarbamol 7 66\"   Wt 252 lb   BMI 40.67 kg/m²   Physical Exam   No physical examination performed today.   This was a counseling session regarding CT scan findings.     CT ABDOMEN+PELVIS(CPT=74176)     COMPARISON:  [facility], CT ABDOMEN+PELVIS KIDNEYSTONE 2D RNDR(NO Julian García MD on 10/09/2017 at 11:09       Approved by: Zora Marie MD             Assessment   Left inguinal hernia  (primary encounter diagnosis)     Plan      · The patient has a small fat-containing umbilical hernia and a small fat-containing left in

## 2017-12-07 NOTE — ANESTHESIA POSTPROCEDURE EVALUATION
Avinash Troncoso 47 Patient Status:  Hospital Outpatient Surgery   Age/Gender 65year old male MRN [id number]   [facility] SURGERY Attending Viktoriya Tom MD   Hosp Day # 0 PCP Seng Bailey MD       Anesthesia Post-op Note

## 2017-12-07 NOTE — ANESTHESIA PREPROCEDURE EVALUATION
PRE-OP EVALUATION    Patient Name: Vivek Starr    Pre-op Diagnosis: Non-recurrent unilateral inguinal hernia without obstruction or gangrene [K40.90]    Procedure(s):  ROBOTIC ASSISTED LAPAROSCOPIC LEFT INGUINAL HERNIA REPAIR AND UMBILICAL Mary Jane Daniela notable dental history. Pulmonary    Pulmonary exam normal.                 Other findings            ASA: 3   Plan: general  NPO status verified and     Post-procedure pain management plan discussed with surgeon and patient.       Plan/risks discus

## 2017-12-07 NOTE — BRIEF OP NOTE
Pre-Operative Diagnosis: Non-recurrent unilateral inguinal hernia without obstruction or gangrene [K40.90]     Post-Operative Diagnosis: Non-recurrent bilateral inguinal hernia without obstruction or gangrene , umbilical hernia     Procedure Performed:

## 2017-12-07 NOTE — OPERATIVE REPORT
OPERATIVE REPORT    PREOPERATIVE DIAGNOSIS:  Left inguinal hernia. Umbilical hernia. POSTOPERATIVE DIAGNOSIS:  Bilateral Pantaloon inguinal hernia.  (Direct and indirect)  Umbilical hernia  PROCEDURE PERFORMED: Robotic bilateral inguinal hernia repair wit satisfaction after which the patient provided willing and informed consent to proceed with surgery.     PROCEDURE: The patient was brought to the operating room, and after induction of general endotracheal anesthesia, the abdomen was prepped and draped in u well as the trocars. The umbilical hernia was repaired with interrupted 0-Ethibond suture. All wounds were cleansed and irrigated. The skin incisions were reapproximated using running subcuticular 4-0 Monocryl suture. Local anesthetic was injected.   Steri

## 2017-12-08 ENCOUNTER — HOSPITAL ENCOUNTER (EMERGENCY)
Age: 62
Discharge: HOME | End: 2017-12-08
Attending: EMERGENCY MEDICINE
Payer: COMMERCIAL

## 2017-12-08 ENCOUNTER — TELEPHONE (OUTPATIENT)
Dept: SURGERY | Facility: CLINIC | Age: 62
End: 2017-12-08

## 2017-12-08 VITALS
BODY MASS INDEX: 40.18 KG/M2 | SYSTOLIC BLOOD PRESSURE: 150 MMHG | HEIGHT: 66 IN | WEIGHT: 250 LBS | DIASTOLIC BLOOD PRESSURE: 71 MMHG | RESPIRATION RATE: 20 BRPM | HEART RATE: 78 BPM | OXYGEN SATURATION: 98 %

## 2017-12-08 DIAGNOSIS — R33.9 URINARY RETENTION: Primary | ICD-10-CM

## 2017-12-08 PROCEDURE — 81003 URINALYSIS AUTO W/O SCOPE: CPT | Performed by: EMERGENCY MEDICINE

## 2017-12-08 PROCEDURE — 99283 EMERGENCY DEPT VISIT LOW MDM: CPT

## 2017-12-08 RX ORDER — LIDOCAINE HYDROCHLORIDE 20 MG/ML
10 JELLY TOPICAL ONCE
Status: COMPLETED | OUTPATIENT
Start: 2017-12-08 | End: 2017-12-08

## 2017-12-08 RX ORDER — TAMSULOSIN HYDROCHLORIDE 0.4 MG/1
0.4 CAPSULE ORAL DAILY
Qty: 7 CAPSULE | Refills: 0 | Status: SHIPPED | OUTPATIENT
Start: 2017-12-08 | End: 2017-12-15

## 2017-12-08 NOTE — OR NURSING
Pt called with multiple questions about his post op pain, what can he take for constipation, when will the burning end w/ urination, why can't he sleep good, can he take his own Mag Citrate and can he take his own tramadolol versus the norco script that wa

## 2017-12-08 NOTE — TELEPHONE ENCOUNTER
On 12/7/2018 patient had Robotic bilateral inguinal hernia repair with mesh ( ProGrip mesh used); Umbilical herniorrhaphy per Dr. Raheel Velez. Patient went to ER during the night due to inability to void.  In the ER a a straight urinary catheter was placed, ap

## 2017-12-08 NOTE — ED PROVIDER NOTES
Patient Seen in: THE [facility] In [place]    History   Patient presents with:  Postop/Procedure Problem  Retention    Stated Complaint: post op unable to urinate     HPI    Bilateral inguinal hernia and umbilical hernia repair earlier to drained. Patient did feel much better after this. Juarez removed and the patient discharged with 1 week of Flomax. Follow-up if further problems occur.           Disposition and Plan     Clinical Impression:  Urinary retention  (primary encounter diagnosi

## 2017-12-12 ENCOUNTER — OFFICE VISIT (OUTPATIENT)
Dept: SURGERY | Facility: CLINIC | Age: 62
End: 2017-12-12

## 2017-12-12 VITALS
SYSTOLIC BLOOD PRESSURE: 122 MMHG | WEIGHT: 248 LBS | TEMPERATURE: 98 F | HEIGHT: 66 IN | DIASTOLIC BLOOD PRESSURE: 81 MMHG | BODY MASS INDEX: 39.86 KG/M2 | HEART RATE: 80 BPM

## 2017-12-12 DIAGNOSIS — K40.20 BILATERAL INGUINAL HERNIA WITHOUT OBSTRUCTION OR GANGRENE, RECURRENCE NOT SPECIFIED: Primary | ICD-10-CM

## 2017-12-12 DIAGNOSIS — K42.9 UMBILICAL HERNIA WITHOUT OBSTRUCTION AND WITHOUT GANGRENE: ICD-10-CM

## 2017-12-12 PROBLEM — K40.90 LEFT INGUINAL HERNIA: Status: RESOLVED | Noted: 2017-10-17 | Resolved: 2017-12-12

## 2017-12-12 PROCEDURE — 99024 POSTOP FOLLOW-UP VISIT: CPT | Performed by: PHYSICIAN ASSISTANT

## 2017-12-12 NOTE — PROGRESS NOTES
Post Operative Visit Note       Active Problems  1. Bilateral inguinal hernia without obstruction or gangrene, recurrence not specified    2.  Umbilical hernia without obstruction and without gangrene         Chief Complaint   Patient presents with:  Post-O Relation Age of Onset   • Diabetes Father    • Diabetes Mother      Social History    Marital status:              Spouse name:                       Years of education:                 Number of children:               Social History Main Topics for abdominal pain. Negative for abdominal distention, anal bleeding, blood in stool, constipation, diarrhea, nausea and vomiting. Genitourinary: Negative for difficulty urinating, dysuria, frequency and urgency.    Musculoskeletal: Negative for arthralgi without obstruction and without gangrene      Plan   The patient is recovering well following surgery. We reviewed his procedure and appropriate aftercare in detail at today's visit.     The patient was found to have bilateral inguinal hernias, both direct

## 2017-12-19 ENCOUNTER — OFFICE VISIT (OUTPATIENT)
Dept: SURGERY | Facility: CLINIC | Age: 62
End: 2017-12-19

## 2017-12-19 VITALS
DIASTOLIC BLOOD PRESSURE: 60 MMHG | RESPIRATION RATE: 16 BRPM | OXYGEN SATURATION: 98 % | HEIGHT: 66 IN | HEART RATE: 85 BPM | WEIGHT: 248 LBS | SYSTOLIC BLOOD PRESSURE: 130 MMHG | BODY MASS INDEX: 39.86 KG/M2

## 2017-12-19 DIAGNOSIS — K40.20 BILATERAL INGUINAL HERNIA WITHOUT OBSTRUCTION OR GANGRENE, RECURRENCE NOT SPECIFIED: Primary | ICD-10-CM

## 2017-12-19 PROCEDURE — 99024 POSTOP FOLLOW-UP VISIT: CPT | Performed by: SURGERY

## 2017-12-19 NOTE — PROGRESS NOTES
Post Operative Visit Note       Active Problems  No diagnosis found. Chief Complaint   Patient presents with:  Post-Op: 2nd P/O s/p bilat ING hernia and umbilical hernia repair on 12/7.          History of Present Illness     Patient presents for contin Used                        Alcohol use: No              Drug use: No                 Current Outpatient Prescriptions:  docusate sodium 100 MG Oral Tab Take 1 tablet (100 mg total) by mouth daily.  Disp: 10 tablet Rfl: 0   Dextromethorphan-Guaifenesin (MUC problems and sleep disturbance. Physical Findings   There were no vitals taken for this visit. Physical Exam   Abdominal: Soft. Bowel sounds are normal. There is no hepatosplenomegaly. There is no tenderness. There is no CVA tenderness. No hernia.  H

## 2018-02-02 RX ORDER — ERGOCALCIFEROL 1.25 MG/1
CAPSULE ORAL
Qty: 12 CAPSULE | OUTPATIENT
Start: 2018-02-02

## 2018-02-02 NOTE — TELEPHONE ENCOUNTER
Levothyroxine 112 mcg 1 tab daily filled 9-20-17 90 with 3 refill  Levothyroxine was send to [facility] on 9-20-17 90 with 3 refills     LVM to call office does he want us to cancel at [facility] and send to mail order please let us know     Lacie sheffield 50,000    2

## 2018-02-05 RX ORDER — LEVOTHYROXINE SODIUM 112 UG/1
112 TABLET ORAL
Qty: 90 TABLET | Refills: 3 | OUTPATIENT
Start: 2018-02-05

## 2018-02-05 NOTE — TELEPHONE ENCOUNTER
From: Blessing Villela  Sent: 2/2/2018 5:23 PM CST  Subject: Medication Renewal Request    Arron Gomes would like a refill of the following medications:     Levothyroxine Sodium 112 MCG Oral Tab Álvaro Mcclelland MD]   Patient Comment: needs refill at [facility]

## 2018-02-06 RX ORDER — LEVOTHYROXINE SODIUM 112 UG/1
TABLET ORAL
Qty: 90 TABLET | Refills: 0 | OUTPATIENT
Start: 2018-02-06

## 2018-03-13 RX ORDER — ERGOCALCIFEROL 1.25 MG/1
CAPSULE ORAL
Qty: 12 CAPSULE | OUTPATIENT
Start: 2018-03-13

## 2018-03-19 NOTE — TELEPHONE ENCOUNTER
Refill request: Vitamin D    Failed protocol    Last appt: 11/14/17 RTC in 3 months  Next appt: 3/20/18  Last refill: 12/10/16 12 capsules    Last labs: 11/15/17 Vitamin D - advised to take 1000 OTC daily.    8/8/17 Vitamin D level was low and MD advised sh

## 2018-03-20 ENCOUNTER — OFFICE VISIT (OUTPATIENT)
Dept: INTERNAL MEDICINE CLINIC | Facility: CLINIC | Age: 63
End: 2018-03-20

## 2018-03-20 ENCOUNTER — APPOINTMENT (OUTPATIENT)
Dept: LAB | Age: 63
End: 2018-03-20
Attending: INTERNAL MEDICINE
Payer: COMMERCIAL

## 2018-03-20 VITALS
DIASTOLIC BLOOD PRESSURE: 84 MMHG | SYSTOLIC BLOOD PRESSURE: 124 MMHG | HEIGHT: 66 IN | HEART RATE: 70 BPM | WEIGHT: 257 LBS | BODY MASS INDEX: 41.3 KG/M2 | RESPIRATION RATE: 16 BRPM

## 2018-03-20 DIAGNOSIS — E53.8 B12 DEFICIENCY: ICD-10-CM

## 2018-03-20 DIAGNOSIS — M54.50 CHRONIC BILATERAL LOW BACK PAIN WITHOUT SCIATICA: ICD-10-CM

## 2018-03-20 DIAGNOSIS — G89.29 CHRONIC BILATERAL LOW BACK PAIN WITHOUT SCIATICA: ICD-10-CM

## 2018-03-20 DIAGNOSIS — E55.9 HYPOVITAMINOSIS D: ICD-10-CM

## 2018-03-20 DIAGNOSIS — E78.1 HYPERTRIGLYCERIDEMIA: ICD-10-CM

## 2018-03-20 DIAGNOSIS — E03.9 HYPOTHYROIDISM, UNSPECIFIED TYPE: ICD-10-CM

## 2018-03-20 DIAGNOSIS — R73.03 PRE-DIABETES: Primary | ICD-10-CM

## 2018-03-20 DIAGNOSIS — R73.03 PRE-DIABETES: ICD-10-CM

## 2018-03-20 LAB
25-HYDROXYVITAMIN D (TOTAL): 44 NG/ML (ref 30–100)
ALBUMIN SERPL-MCNC: 3.9 G/DL (ref 3.5–4.8)
ALP LIVER SERPL-CCNC: 87 U/L (ref 45–117)
ALT SERPL-CCNC: 18 U/L (ref 17–63)
AST SERPL-CCNC: 15 U/L (ref 15–41)
BILIRUB SERPL-MCNC: 0.6 MG/DL (ref 0.1–2)
BUN BLD-MCNC: 13 MG/DL (ref 8–20)
CALCIUM BLD-MCNC: 9.2 MG/DL (ref 8.3–10.3)
CARTRIDGE LOT#: 799 NUMERIC
CHLORIDE: 103 MMOL/L (ref 101–111)
CHOLEST SMN-MCNC: 184 MG/DL (ref ?–200)
CO2: 27 MMOL/L (ref 22–32)
CREAT BLD-MCNC: 1.08 MG/DL (ref 0.7–1.3)
CREAT UR-SCNC: 110 MG/DL
FREE T4: 1.2 NG/DL (ref 0.9–1.8)
GLUCOSE BLD-MCNC: 104 MG/DL (ref 70–99)
HAV AB SERPL IA-ACNC: 306 PG/ML (ref 193–986)
HDLC SERPL-MCNC: 36 MG/DL (ref 45–?)
HDLC SERPL: 5.11 {RATIO} (ref ?–4.97)
HEMOGLOBIN A1C: 6.3 % (ref 4.3–5.6)
LDLC SERPL CALC-MCNC: 124 MG/DL (ref ?–130)
M PROTEIN MFR SERPL ELPH: 7.8 G/DL (ref 6.1–8.3)
MICROALBUMIN UR-MCNC: 0.83 MG/DL
MICROALBUMIN/CREAT 24H UR-RTO: 7.5 UG/MG (ref ?–30)
NONHDLC SERPL-MCNC: 148 MG/DL (ref ?–130)
POTASSIUM SERPL-SCNC: 4.4 MMOL/L (ref 3.6–5.1)
SODIUM SERPL-SCNC: 135 MMOL/L (ref 136–144)
TRIGL SERPL-MCNC: 121 MG/DL (ref ?–150)
TSI SER-ACNC: 3.38 MIU/ML (ref 0.35–5.5)
VLDLC SERPL CALC-MCNC: 24 MG/DL (ref 5–40)

## 2018-03-20 PROCEDURE — 99214 OFFICE O/P EST MOD 30 MIN: CPT | Performed by: INTERNAL MEDICINE

## 2018-03-20 PROCEDURE — 83036 HEMOGLOBIN GLYCOSYLATED A1C: CPT | Performed by: INTERNAL MEDICINE

## 2018-03-20 PROCEDURE — 82607 VITAMIN B-12: CPT

## 2018-03-20 PROCEDURE — 82043 UR ALBUMIN QUANTITATIVE: CPT

## 2018-03-20 PROCEDURE — 36415 COLL VENOUS BLD VENIPUNCTURE: CPT

## 2018-03-20 PROCEDURE — 82306 VITAMIN D 25 HYDROXY: CPT

## 2018-03-20 PROCEDURE — 84439 ASSAY OF FREE THYROXINE: CPT

## 2018-03-20 PROCEDURE — 84443 ASSAY THYROID STIM HORMONE: CPT

## 2018-03-20 PROCEDURE — 82570 ASSAY OF URINE CREATININE: CPT

## 2018-03-20 PROCEDURE — 80053 COMPREHEN METABOLIC PANEL: CPT

## 2018-03-20 PROCEDURE — 80061 LIPID PANEL: CPT

## 2018-03-20 PROCEDURE — 96372 THER/PROPH/DIAG INJ SC/IM: CPT | Performed by: INTERNAL MEDICINE

## 2018-03-20 RX ORDER — CYANOCOBALAMIN 1000 UG/ML
1000 INJECTION INTRAMUSCULAR; SUBCUTANEOUS ONCE
Status: COMPLETED | OUTPATIENT
Start: 2018-03-20 | End: 2018-03-20

## 2018-03-20 RX ORDER — NAPROXEN 500 MG/1
500 TABLET ORAL 2 TIMES DAILY WITH MEALS
Qty: 20 TABLET | Refills: 0 | Status: SHIPPED | OUTPATIENT
Start: 2018-03-20 | End: 2018-09-07 | Stop reason: ALTCHOICE

## 2018-03-20 RX ADMIN — CYANOCOBALAMIN 1000 MCG: 1000 INJECTION INTRAMUSCULAR; SUBCUTANEOUS at 12:07:00

## 2018-03-20 NOTE — PROGRESS NOTES
[facility] Internal Medicine Office Note  Chief Complaint:   Patient presents with:  Diabetes  Abdominal Pain: RLQ x 1 month      HPI:   This is a 65year old male coming in for several concerns   HPI  Back pain starts 2/10 in morning and gets pa tablet (150 mg total) by mouth 2 (two) times daily. (Patient taking differently: Take 150 mg by mouth as needed.  ) Disp: 60 tablet Rfl: 5         REVIEW OF SYSTEMS:   Review of Systems   Constitutional: Negative for fever.    Eyes: Negative for visual dist pain.    Diagnoses and all orders for this visit:    Pre-diabetes - diet controlled. a1c ok at 6.3. Weight loss. Consider metformin.  -     HEMOGLOBIN A1C  -     MICROALB/CREAT RATIO, RANDOM URINE;  Future    Hypertriglyceridemia - repeat lipid panel   - levothyroxine to [facility] on [address] after results received

## 2018-03-21 ENCOUNTER — TELEPHONE (OUTPATIENT)
Dept: INTERNAL MEDICINE CLINIC | Facility: CLINIC | Age: 63
End: 2018-03-21

## 2018-03-21 RX ORDER — ERGOCALCIFEROL 1.25 MG/1
CAPSULE ORAL
Qty: 12 CAPSULE | OUTPATIENT
Start: 2018-03-21

## 2018-03-21 RX ORDER — LEVOTHYROXINE SODIUM 112 UG/1
112 TABLET ORAL
Qty: 90 TABLET | Refills: 3 | Status: SHIPPED | OUTPATIENT
Start: 2018-03-21 | End: 2018-09-07

## 2018-03-27 ENCOUNTER — TELEPHONE (OUTPATIENT)
Dept: SURGERY | Facility: CLINIC | Age: 63
End: 2018-03-27

## 2018-03-27 RX ORDER — ERGOCALCIFEROL 1.25 MG/1
50000 CAPSULE ORAL WEEKLY
Qty: 4 CAPSULE | Refills: 0 | OUTPATIENT
Start: 2018-03-27 | End: 2018-04-26

## 2018-05-20 ENCOUNTER — APPOINTMENT (OUTPATIENT)
Dept: GENERAL RADIOLOGY | Age: 63
End: 2018-05-20
Attending: EMERGENCY MEDICINE
Payer: COMMERCIAL

## 2018-05-20 ENCOUNTER — HOSPITAL ENCOUNTER (EMERGENCY)
Age: 63
Discharge: LEFT BEFORE DISCHARGE | End: 2018-05-20
Attending: EMERGENCY MEDICINE
Payer: COMMERCIAL

## 2018-05-20 VITALS
WEIGHT: 254 LBS | OXYGEN SATURATION: 99 % | DIASTOLIC BLOOD PRESSURE: 83 MMHG | TEMPERATURE: 98 F | SYSTOLIC BLOOD PRESSURE: 166 MMHG | HEIGHT: 66 IN | RESPIRATION RATE: 18 BRPM | HEART RATE: 74 BPM | BODY MASS INDEX: 40.82 KG/M2

## 2018-05-20 DIAGNOSIS — R07.9 CHEST PAIN OF UNCERTAIN ETIOLOGY: ICD-10-CM

## 2018-05-20 DIAGNOSIS — Z53.29 LEFT AGAINST MEDICAL ADVICE: ICD-10-CM

## 2018-05-20 DIAGNOSIS — R06.02 SHORTNESS OF BREATH: Primary | ICD-10-CM

## 2018-05-20 PROCEDURE — 71045 X-RAY EXAM CHEST 1 VIEW: CPT | Performed by: EMERGENCY MEDICINE

## 2018-05-20 PROCEDURE — 93010 ELECTROCARDIOGRAM REPORT: CPT

## 2018-05-20 PROCEDURE — 81003 URINALYSIS AUTO W/O SCOPE: CPT | Performed by: EMERGENCY MEDICINE

## 2018-05-20 PROCEDURE — 99285 EMERGENCY DEPT VISIT HI MDM: CPT

## 2018-05-20 PROCEDURE — 83880 ASSAY OF NATRIURETIC PEPTIDE: CPT | Performed by: EMERGENCY MEDICINE

## 2018-05-20 PROCEDURE — 85025 COMPLETE CBC W/AUTO DIFF WBC: CPT | Performed by: EMERGENCY MEDICINE

## 2018-05-20 PROCEDURE — 93005 ELECTROCARDIOGRAM TRACING: CPT

## 2018-05-20 PROCEDURE — 36415 COLL VENOUS BLD VENIPUNCTURE: CPT

## 2018-05-20 PROCEDURE — 84484 ASSAY OF TROPONIN QUANT: CPT | Performed by: EMERGENCY MEDICINE

## 2018-05-20 PROCEDURE — 85378 FIBRIN DEGRADE SEMIQUANT: CPT | Performed by: EMERGENCY MEDICINE

## 2018-05-20 PROCEDURE — 80053 COMPREHEN METABOLIC PANEL: CPT | Performed by: EMERGENCY MEDICINE

## 2018-05-20 NOTE — ED NOTES
Pt states he is tired of waiting, states he is feeling better and would like to go home, but is agreeable to timed troponin redraw at this time

## 2018-05-20 NOTE — ED NOTES
Pts daughter at nurses station requesting the \"exact time\" of the next blood draw, information provided.

## 2018-05-20 NOTE — ED INITIAL ASSESSMENT (HPI)
Unable to describe symptoms-- states his breathing doesn't feel \"normal\"  Unable to sleep last night due to breathing-- c/o weakness-- denies cp-- no headache, no vomiting

## 2018-05-20 NOTE — ED PROVIDER NOTES
Patient Seen in: [facility] Emergency Department In [place]    History   Patient presents with:  Dyspnea PAULETTE SOB (respiratory)    Stated Complaint: SOB since last night, denies chest pain, able to talk without difficulty    HPI    70-year-old male present equal react to light extraocular muscles intact no scleral icterus, mucous membranes are moist, there is no erythema or exudate in the posterior pharynx  Neck: Supple no JVD no lymphadenopathy no meningismus no carotid bruit  CV: Regular rate and rhythm no Please view results for these tests on the individual orders. RAINBOW DRAW BLUE   RAINBOW DRAW GOLD   RAINBOW DRAW LAVENDER   RAINBOW DRAW LIGHT GREEN     EKG    Rate, intervals and axes as noted on EKG Report.   Rate: 84  Rhythm: Sinus Rhythm

## 2018-05-22 ENCOUNTER — TELEPHONE (OUTPATIENT)
Dept: INTERNAL MEDICINE CLINIC | Facility: CLINIC | Age: 63
End: 2018-05-22

## 2018-05-22 ENCOUNTER — OFFICE VISIT (OUTPATIENT)
Dept: INTERNAL MEDICINE CLINIC | Facility: CLINIC | Age: 63
End: 2018-05-22

## 2018-05-22 VITALS
TEMPERATURE: 99 F | RESPIRATION RATE: 22 BRPM | WEIGHT: 255.75 LBS | HEART RATE: 78 BPM | SYSTOLIC BLOOD PRESSURE: 110 MMHG | HEIGHT: 66 IN | DIASTOLIC BLOOD PRESSURE: 76 MMHG | BODY MASS INDEX: 41.1 KG/M2

## 2018-05-22 DIAGNOSIS — E53.8 B12 DEFICIENCY: ICD-10-CM

## 2018-05-22 DIAGNOSIS — R73.03 PRE-DIABETES: ICD-10-CM

## 2018-05-22 DIAGNOSIS — R21 RASH: ICD-10-CM

## 2018-05-22 DIAGNOSIS — K21.9 GASTROESOPHAGEAL REFLUX DISEASE WITHOUT ESOPHAGITIS: ICD-10-CM

## 2018-05-22 DIAGNOSIS — R06.02 SHORTNESS OF BREATH: Primary | ICD-10-CM

## 2018-05-22 PROCEDURE — 96372 THER/PROPH/DIAG INJ SC/IM: CPT | Performed by: INTERNAL MEDICINE

## 2018-05-22 PROCEDURE — 99214 OFFICE O/P EST MOD 30 MIN: CPT | Performed by: INTERNAL MEDICINE

## 2018-05-22 RX ORDER — CYANOCOBALAMIN 1000 UG/ML
1000 INJECTION INTRAMUSCULAR; SUBCUTANEOUS ONCE
Status: COMPLETED | OUTPATIENT
Start: 2018-05-22 | End: 2018-05-22

## 2018-05-22 RX ORDER — OMEPRAZOLE 40 MG/1
40 CAPSULE, DELAYED RELEASE ORAL DAILY
Qty: 30 CAPSULE | Refills: 5 | Status: SHIPPED | OUTPATIENT
Start: 2018-05-22 | End: 2019-09-25

## 2018-05-22 RX ORDER — MOMETASONE FUROATE 1 MG/G
CREAM TOPICAL
Qty: 45 G | Refills: 0 | Status: SHIPPED | OUTPATIENT
Start: 2018-05-22 | End: 2018-05-31 | Stop reason: ALTCHOICE

## 2018-05-22 RX ADMIN — CYANOCOBALAMIN 1000 MCG: 1000 INJECTION INTRAMUSCULAR; SUBCUTANEOUS at 11:39:00

## 2018-05-22 NOTE — TELEPHONE ENCOUNTER
Faxed Authorization to use and disclose health information form to Dr. Hernando Sommer for cardiac testing, stress testing and cathertization. [id number] and already received.

## 2018-05-22 NOTE — PROGRESS NOTES
[facility] Internal Medicine Office Note  Chief Complaint:   Patient presents with:  ER F/U: breathing problems and high blood pressure.  ER 5/20/18      HPI:   This is a 65year old male coming in for ER follow up   HPI   Had neg trop x2, normal total) by mouth daily. Disp: 30 capsule Rfl: 5   Levothyroxine Sodium 112 MCG Oral Tab Take 1 tablet (112 mcg total) by mouth before breakfast. Disp: 90 tablet Rfl: 3   docusate sodium 100 MG Oral Tab Take 1 tablet (100 mg total) by mouth daily.  Disp: 10 t diagnosis)  Rash  Pre-diabetes  Gastroesophageal reflux disease without esophagitis  B12 deficiency      The plan is as follows  Arron was seen today for er f/u.     Diagnoses and all orders for this visit:    Shortness of breath - recommend further cardia Outcome: Patient verbalizes understanding. Patient is notified to call with any questions, complications, allergies, or worsening or changing symptoms. Patient is to call with any side effects or complications from the treatments as a result of today.

## 2018-05-22 NOTE — PATIENT INSTRUCTIONS
Start omeprazole once daily before breakfast     Follow up with cardiology tomorrow as scheduled     Follow up with dermatology     Blood work after June 21, 2018

## 2018-05-23 ENCOUNTER — PRIOR ORIGINAL RECORDS (OUTPATIENT)
Dept: OTHER | Age: 63
End: 2018-05-23

## 2018-05-24 ENCOUNTER — MED REC SCAN ONLY (OUTPATIENT)
Dept: INTERNAL MEDICINE CLINIC | Facility: CLINIC | Age: 63
End: 2018-05-24

## 2018-05-24 ENCOUNTER — PRIOR ORIGINAL RECORDS (OUTPATIENT)
Dept: OTHER | Age: 63
End: 2018-05-24

## 2018-05-25 ENCOUNTER — HOSPITAL ENCOUNTER (OUTPATIENT)
Dept: CV DIAGNOSTICS | Facility: HOSPITAL | Age: 63
Discharge: HOME | End: 2018-05-25
Attending: INTERNAL MEDICINE
Payer: COMMERCIAL

## 2018-05-25 ENCOUNTER — MYAURORA ACCOUNT LINK (OUTPATIENT)
Dept: OTHER | Age: 63
End: 2018-05-25

## 2018-05-25 ENCOUNTER — HOSPITAL ENCOUNTER (OUTPATIENT)
Dept: CV DIAGNOSTICS | Facility: HOSPITAL | Age: 63
Discharge: HOME | End: 2018-05-25
Attending: INTERNAL MEDICINE

## 2018-05-25 DIAGNOSIS — R06.00 DYSPNEA ON EXERTION: ICD-10-CM

## 2018-05-25 DIAGNOSIS — R06.00 DYSPNEA: ICD-10-CM

## 2018-05-25 DIAGNOSIS — R07.2 PRECORDIAL PAIN: ICD-10-CM

## 2018-05-25 DIAGNOSIS — R07.9 CHEST PAIN: ICD-10-CM

## 2018-05-25 PROCEDURE — 78452 HT MUSCLE IMAGE SPECT MULT: CPT | Performed by: INTERNAL MEDICINE

## 2018-05-25 PROCEDURE — 93018 CV STRESS TEST I&R ONLY: CPT | Performed by: INTERNAL MEDICINE

## 2018-05-25 PROCEDURE — 93017 CV STRESS TEST TRACING ONLY: CPT | Performed by: INTERNAL MEDICINE

## 2018-05-29 ENCOUNTER — PRIOR ORIGINAL RECORDS (OUTPATIENT)
Dept: OTHER | Age: 63
End: 2018-05-29

## 2018-05-31 NOTE — HISTORICAL OFFICE NOTE
Michele Self  : 06/15/1955  05/29/18 14:32:08  ACCOUNT:  [id number]  HOME PHONE:  [phone]  WORK PHONE:  [phone]    Per MVD review of stress test:    1. This is abnormal. Needs tobias Gregory Atonarp or [employer], hopefully this week. 2. No strenuous exertion  2.  St

## 2018-05-31 NOTE — H&P
Michele Self  : 06/15/1955  ACCOUNT:  [id number]  [phone]  PCP:  Dr. Samantha Da Silva  TODAY'S DATE: 2018  DICTATED BY:  [Dr. eSlena Combs      CHIEF COMPLAINT: [chest discomfort and dyspnea.]    HPI:    [On 2018, Ana Kimble, a 65year old hemorrhoidectomy and hernia repair    PAST CV HISTORY:     FAMILY HISTORY: Negative for premature CAD. Negative for AAA. SOCIAL HISTORY: SMOKING: Never used tobacco. denies smoking. CAFFEINE: 1 cup coffee daily. ALCOHOL: denies drinking.  EXERCISE: no regu suggested he get an EBCT and carotid screening done as well. He does need to be started on medication for hypercholesterolemia and atherosclerosis I will begin atorvastatin 20 mg daily. He is in agreement to starting this medicine.   When I see him back i

## 2018-06-01 ENCOUNTER — PRIOR ORIGINAL RECORDS (OUTPATIENT)
Dept: OTHER | Age: 63
End: 2018-06-01

## 2018-06-05 ENCOUNTER — HOSPITAL ENCOUNTER (OUTPATIENT)
Dept: INTERVENTIONAL RADIOLOGY/VASCULAR | Facility: HOSPITAL | Age: 63
Discharge: HOME | End: 2018-06-05
Attending: INTERNAL MEDICINE | Admitting: INTERNAL MEDICINE
Payer: COMMERCIAL

## 2018-06-05 VITALS
RESPIRATION RATE: 14 BRPM | DIASTOLIC BLOOD PRESSURE: 105 MMHG | HEIGHT: 66 IN | TEMPERATURE: 99 F | SYSTOLIC BLOOD PRESSURE: 146 MMHG | HEART RATE: 67 BPM | WEIGHT: 255 LBS | BODY MASS INDEX: 40.98 KG/M2 | OXYGEN SATURATION: 99 %

## 2018-06-05 DIAGNOSIS — R94.39 ABNORMAL STRESS TEST: ICD-10-CM

## 2018-06-05 PROCEDURE — 93458 L HRT ARTERY/VENTRICLE ANGIO: CPT

## 2018-06-05 PROCEDURE — 4A023N7 MEASUREMENT OF CARDIAC SAMPLING AND PRESSURE, LEFT HEART, PERCUTANEOUS APPROACH: ICD-10-PCS | Performed by: INTERNAL MEDICINE

## 2018-06-05 PROCEDURE — 99153 MOD SED SAME PHYS/QHP EA: CPT

## 2018-06-05 PROCEDURE — B2151ZZ FLUOROSCOPY OF LEFT HEART USING LOW OSMOLAR CONTRAST: ICD-10-PCS | Performed by: INTERNAL MEDICINE

## 2018-06-05 PROCEDURE — 99152 MOD SED SAME PHYS/QHP 5/>YRS: CPT

## 2018-06-05 PROCEDURE — B2111ZZ FLUOROSCOPY OF MULTIPLE CORONARY ARTERIES USING LOW OSMOLAR CONTRAST: ICD-10-PCS | Performed by: INTERNAL MEDICINE

## 2018-06-05 RX ORDER — MIDAZOLAM HYDROCHLORIDE 1 MG/ML
INJECTION INTRAMUSCULAR; INTRAVENOUS
Status: COMPLETED
Start: 2018-06-05 | End: 2018-06-05

## 2018-06-05 RX ORDER — HEPARIN SODIUM 5000 [USP'U]/ML
INJECTION, SOLUTION INTRAVENOUS; SUBCUTANEOUS
Status: COMPLETED
Start: 2018-06-05 | End: 2018-06-05

## 2018-06-05 RX ORDER — SODIUM CHLORIDE 9 MG/ML
INJECTION, SOLUTION INTRAVENOUS CONTINUOUS
Status: DISCONTINUED | OUTPATIENT
Start: 2018-06-05 | End: 2018-06-05

## 2018-06-05 RX ORDER — HYDROCODONE BITARTRATE AND ACETAMINOPHEN 5; 325 MG/1; MG/1
1 TABLET ORAL EVERY 6 HOURS PRN
Status: DISCONTINUED | OUTPATIENT
Start: 2018-06-05 | End: 2018-06-05

## 2018-06-05 RX ORDER — HYDROCODONE BITARTRATE AND ACETAMINOPHEN 5; 325 MG/1; MG/1
TABLET ORAL
Status: COMPLETED
Start: 2018-06-05 | End: 2018-06-05

## 2018-06-05 RX ORDER — LIDOCAINE HYDROCHLORIDE 10 MG/ML
INJECTION, SOLUTION EPIDURAL; INFILTRATION; INTRACAUDAL; PERINEURAL
Status: COMPLETED
Start: 2018-06-05 | End: 2018-06-05

## 2018-06-05 RX ORDER — METOPROLOL SUCCINATE 25 MG/1
25 TABLET, EXTENDED RELEASE ORAL DAILY
COMMUNITY
End: 2019-04-16

## 2018-06-05 RX ADMIN — HYDROCODONE BITARTRATE AND ACETAMINOPHEN 1 TABLET: 5; 325 TABLET ORAL at 15:39:00

## 2018-06-05 RX ADMIN — SODIUM CHLORIDE: 9 INJECTION, SOLUTION INTRAVENOUS at 14:00:00

## 2018-06-05 RX ADMIN — SODIUM CHLORIDE: 9 INJECTION, SOLUTION INTRAVENOUS at 12:30:00

## 2018-06-05 NOTE — PROCEDURES
[facility]  Angiogram Procedure Note    Rahel Garcia Location: Cath Lab    CSN [id number] MRN [id number]   Admission Date 6/5/2018 Procedure Date 6/5/2018   Attending Physician Khadijah Arvizu MD Procedure Physician Crista Neri MD     Pre-Proce left main coronary artery is no significant disease  3. The circumflex is a left dominant system. There is a ramus intermedius branch that has no significant disease.   The second obtuse marginal artery has a 45-50% stenosis the left posterior descending

## 2018-06-05 NOTE — PROGRESS NOTES
Pt states pain better in thigh. Ambulated in cano, rt groin remains soft, c/d/I.discharge instructions reviewed with pt, spouse and son. No further questions.  Iv d.cd and pt discharged to home in stable condition

## 2018-06-05 NOTE — H&P
History & Physical Examination    Patient Name: Devante Jarrell  MRN: [id number]  [facility]: [id number]  YOB: 1955    Diagnosis: Abnormal stress test, chest pain    Present Illness: Cardiac catheterization      Prescriptions Prior to Admission:  Met HEART [x ] [ ]    LUNGS [ x] [ ]    ABDOMEN [x ] [ ]    UROGENITAL [x ] [ ]    EXTREMITIES [x ] [ ]    OTHER        [ x ] I have discussed the risks and benefits and alternatives with the patient/family.   They understand and agree to proceed with plan of

## 2018-06-05 NOTE — PROGRESS NOTES
Pt c/o rt thigh and knee pain and states leg feels cold. Rt groin and thigh soft with palpation, pedal pulse unchanged at +2. Dr Gerald Ny notified of pt complaint and assmt. No bruit heard.  Order for norco received

## 2018-06-07 ENCOUNTER — PRIOR ORIGINAL RECORDS (OUTPATIENT)
Dept: OTHER | Age: 63
End: 2018-06-07

## 2018-06-07 ENCOUNTER — TELEPHONE (OUTPATIENT)
Dept: INTERNAL MEDICINE CLINIC | Facility: CLINIC | Age: 63
End: 2018-06-07

## 2018-06-07 NOTE — TELEPHONE ENCOUNTER
Pain can increase blood pressure (if having any post-procedure pain). Monitor today and can follow up tomorrow in office if still elevated.  Can see tomorrow for appt

## 2018-06-07 NOTE — TELEPHONE ENCOUNTER
Patient called for heart test results; he asked for direct call from Dr Sheffield Ormond I stated we direct to nurse first and request-pt understood please callback

## 2018-06-07 NOTE — TELEPHONE ENCOUNTER
Pt was called and pt informed since cardiac angiogram completed on 6/5/18 bp has been up to 180s and 140s, pt also had some headache and feeling facial flushness.      Pt denied as follows: sob or trouble breathing/ chest pain or tightness/ changes of visio

## 2018-06-08 ENCOUNTER — PRIOR ORIGINAL RECORDS (OUTPATIENT)
Dept: OTHER | Age: 63
End: 2018-06-08

## 2018-06-08 ENCOUNTER — OFFICE VISIT (OUTPATIENT)
Dept: INTERNAL MEDICINE CLINIC | Facility: CLINIC | Age: 63
End: 2018-06-08

## 2018-06-08 VITALS
HEART RATE: 68 BPM | HEIGHT: 66 IN | DIASTOLIC BLOOD PRESSURE: 78 MMHG | TEMPERATURE: 98 F | BODY MASS INDEX: 40.98 KG/M2 | RESPIRATION RATE: 16 BRPM | WEIGHT: 255 LBS | SYSTOLIC BLOOD PRESSURE: 120 MMHG

## 2018-06-08 DIAGNOSIS — K21.9 GASTROESOPHAGEAL REFLUX DISEASE WITHOUT ESOPHAGITIS: ICD-10-CM

## 2018-06-08 DIAGNOSIS — J02.9 SORE THROAT: ICD-10-CM

## 2018-06-08 DIAGNOSIS — E53.8 VITAMIN B 12 DEFICIENCY: ICD-10-CM

## 2018-06-08 DIAGNOSIS — I25.10 CORONARY ARTERY DISEASE INVOLVING NATIVE CORONARY ARTERY OF NATIVE HEART WITHOUT ANGINA PECTORIS: Primary | ICD-10-CM

## 2018-06-08 PROCEDURE — 96372 THER/PROPH/DIAG INJ SC/IM: CPT | Performed by: INTERNAL MEDICINE

## 2018-06-08 PROCEDURE — 87880 STREP A ASSAY W/OPTIC: CPT | Performed by: INTERNAL MEDICINE

## 2018-06-08 PROCEDURE — 99214 OFFICE O/P EST MOD 30 MIN: CPT | Performed by: INTERNAL MEDICINE

## 2018-06-08 RX ORDER — CYANOCOBALAMIN 1000 UG/ML
1000 INJECTION INTRAMUSCULAR; SUBCUTANEOUS ONCE
Status: COMPLETED | OUTPATIENT
Start: 2018-06-08 | End: 2018-06-08

## 2018-06-08 RX ADMIN — CYANOCOBALAMIN 1000 MCG: 1000 INJECTION INTRAMUSCULAR; SUBCUTANEOUS at 11:19:00

## 2018-06-08 NOTE — PATIENT INSTRUCTIONS
Take omeprazole every morning and zantac in evening    Follow up in 6 weeks or sooner if swallowing symptoms worsen

## 2018-06-08 NOTE — PROGRESS NOTES
[facility] Internal Medicine Office Note  Chief Complaint:   Patient presents with:  Blood Pressure      HPI:   This is a 65year old male coming in for f/u for BP  HPI  He is worried about his blood pressure being high  809 systolic in hospital Alcohol use: No              Allergies:    Seasonal                ITCHING    Current Outpatient Prescriptions:  Metoprolol Succinate ER 25 MG Oral Tablet 24 Hr Take 25 mg by mouth daily. Disp:  Rfl:    aspirin 81 MG Oral Tab Take 81 mg by mouth daily.  Dee Dee Hadley erythematous. Mouth/Throat: No pharynx erythema. Eyes: Conjunctivae are normal.   Cardiovascular: Normal rate, regular rhythm and normal heart sounds. Pulmonary/Chest: Effort normal and breath sounds normal.   Abdominal: Soft.  He exhibits no distens Education: Patient/Caregiver Education: There are no barriers to learning. Medical education done. Outcome: Patient verbalizes understanding. Patient is notified to call with any questions, complications, allergies, or worsening or changing symptoms.   Gala

## 2018-06-12 ENCOUNTER — TELEPHONE (OUTPATIENT)
Dept: INTERNAL MEDICINE CLINIC | Facility: CLINIC | Age: 63
End: 2018-06-12

## 2018-06-12 ENCOUNTER — HOSPITAL ENCOUNTER (OUTPATIENT)
Age: 63
Discharge: HOME | End: 2018-06-12
Attending: FAMILY MEDICINE
Payer: COMMERCIAL

## 2018-06-12 VITALS
OXYGEN SATURATION: 100 % | RESPIRATION RATE: 26 BRPM | HEART RATE: 76 BPM | TEMPERATURE: 97 F | SYSTOLIC BLOOD PRESSURE: 129 MMHG | DIASTOLIC BLOOD PRESSURE: 73 MMHG

## 2018-06-12 DIAGNOSIS — T78.40XA ALLERGIC REACTION, INITIAL ENCOUNTER: Primary | ICD-10-CM

## 2018-06-12 PROCEDURE — 80047 BASIC METABLC PNL IONIZED CA: CPT

## 2018-06-12 PROCEDURE — 99204 OFFICE O/P NEW MOD 45 MIN: CPT

## 2018-06-12 PROCEDURE — 96375 TX/PRO/DX INJ NEW DRUG ADDON: CPT

## 2018-06-12 PROCEDURE — 99214 OFFICE O/P EST MOD 30 MIN: CPT

## 2018-06-12 PROCEDURE — 85025 COMPLETE CBC W/AUTO DIFF WBC: CPT | Performed by: FAMILY MEDICINE

## 2018-06-12 PROCEDURE — 96374 THER/PROPH/DIAG INJ IV PUSH: CPT

## 2018-06-12 RX ORDER — METHYLPREDNISOLONE SODIUM SUCCINATE 125 MG/2ML
125 INJECTION, POWDER, LYOPHILIZED, FOR SOLUTION INTRAMUSCULAR; INTRAVENOUS ONCE
Status: COMPLETED | OUTPATIENT
Start: 2018-06-12 | End: 2018-06-12

## 2018-06-12 RX ORDER — PREDNISONE 10 MG/1
TABLET ORAL
Qty: 20 TABLET | Refills: 0 | Status: SHIPPED | OUTPATIENT
Start: 2018-06-12 | End: 2018-06-12 | Stop reason: CLARIF

## 2018-06-12 RX ORDER — PREDNISONE 10 MG/1
TABLET ORAL
Qty: 20 TABLET | Refills: 0 | Status: SHIPPED | OUTPATIENT
Start: 2018-06-12 | End: 2018-09-07 | Stop reason: ALTCHOICE

## 2018-06-12 RX ORDER — DIPHENHYDRAMINE HYDROCHLORIDE 50 MG/ML
50 INJECTION INTRAMUSCULAR; INTRAVENOUS ONCE
Status: COMPLETED | OUTPATIENT
Start: 2018-06-12 | End: 2018-06-12

## 2018-06-12 NOTE — TELEPHONE ENCOUNTER
Patient informed of orders, patient verbalized understanding but will proceed to IC as the itching is unbearable

## 2018-06-12 NOTE — TELEPHONE ENCOUNTER
Spoke to patient rash started Sunday @ back are but is spreading over his entire body (chest/hands/legs), has taken 2 tablet of Bendaryl which has helped decrease the itching but not containing the rash, please advise

## 2018-06-12 NOTE — ED PROVIDER NOTES
Patient Seen in: [facility] [place] [facility]    History   Patient presents with:  Rash Skin Problem (integumentary)    Stated Complaint: rash poss allergic reaction to b12 shot    HPI    This 29-year-old male presents to the office with complain times 3  HEAD: Normocephalic, atraumatic  EYES: Sclera anicteric,  conjunctiva normal.  EARS: Tympanic membranes normal, EAC's normal.  NOSE: Turbinates normal, no bleeding noted.   PHARYNX:  No eythema or exudates,airway patent, uvula midline, no angioedem diagnosis)    Disposition:  Discharge  6/12/2018  1:31 pm    Follow-up:  Nicole Cole MD  [address] [place] [address]  [facility]  [phone]    Schedule an appointment as soon as possible for a visit in 3 days  If symptoms worsen        Med

## 2018-06-12 NOTE — ED INITIAL ASSESSMENT (HPI)
Pt states received a B12 shot on Friday. Developed a rash to upper back on Saturday. Has spread to arms and legs and abdomen. Itchy, limited relief with benadryl.

## 2018-06-14 ENCOUNTER — TELEPHONE (OUTPATIENT)
Dept: INTERNAL MEDICINE CLINIC | Facility: CLINIC | Age: 63
End: 2018-06-14

## 2018-06-14 NOTE — TELEPHONE ENCOUNTER
Patient informed of orders, patient verbalized understanding.  Patient scheduled with Dr. Dary Robbins @ 12:30 pm 6/15/18

## 2018-06-14 NOTE — TELEPHONE ENCOUNTER
Patient has a rash, sent to IC, was prescribed prednisone c/o blurred vision, headache, \"not feeling well\", please advise

## 2018-06-14 NOTE — TELEPHONE ENCOUNTER
I would recommend continuing with prednisone for now. Should also take over the counter antihistamine (cetirizine, loratadine or fexofenadine)  If no improvement by morning can see me in the office.     Should go to emergency department if he has any num

## 2018-06-15 NOTE — TELEPHONE ENCOUNTER
Please call this patient back.  When asked what assistance he needed he would only request callback from nurse

## 2018-06-15 NOTE — TELEPHONE ENCOUNTER
Unable to come in today for appt Not feeling well After taking prednisone rxd by Urgent care he has symptoms 2 hours later headand neck .  Has tried antihistamines rash almost gone and itching not as bad would like to stop prednisone since it makes him feel

## 2018-06-28 ENCOUNTER — TELEPHONE (OUTPATIENT)
Dept: INTERNAL MEDICINE CLINIC | Facility: CLINIC | Age: 63
End: 2018-06-28

## 2018-06-28 NOTE — TELEPHONE ENCOUNTER
Patient requesting an order for meningococcal vaccine. Patient states he will be traveling out of the country and would require the meningococcal vaccine.  Please call patient

## 2018-07-10 ENCOUNTER — NURSE ONLY (OUTPATIENT)
Dept: INTERNAL MEDICINE CLINIC | Facility: CLINIC | Age: 63
End: 2018-07-10

## 2018-07-10 ENCOUNTER — TELEPHONE (OUTPATIENT)
Dept: INTERNAL MEDICINE CLINIC | Facility: CLINIC | Age: 63
End: 2018-07-10

## 2018-07-10 PROCEDURE — 90734 MENACWYD/MENACWYCRM VACC IM: CPT | Performed by: INTERNAL MEDICINE

## 2018-07-10 PROCEDURE — 90471 IMMUNIZATION ADMIN: CPT | Performed by: INTERNAL MEDICINE

## 2018-07-10 NOTE — TELEPHONE ENCOUNTER
Pt has appt today for Menactra. Vaccine administration not registering correctly.     Pended new order for approval.

## 2018-08-31 ENCOUNTER — TELEPHONE (OUTPATIENT)
Dept: INTERNAL MEDICINE CLINIC | Facility: CLINIC | Age: 63
End: 2018-08-31

## 2018-08-31 NOTE — TELEPHONE ENCOUNTER
Patient called complaining of dizziness + high blood pressure. Requesting appt as soon as possible.  Please advise

## 2018-09-01 RX ORDER — MECLIZINE HYDROCHLORIDE 25 MG/1
25 TABLET ORAL 3 TIMES DAILY PRN
Qty: 15 TABLET | Refills: 0 | Status: SHIPPED | OUTPATIENT
Start: 2018-09-01 | End: 2019-09-19 | Stop reason: ALTCHOICE

## 2018-09-01 NOTE — TELEPHONE ENCOUNTER
D/w patient. Primary symptoms currently dizziness/vertigo. URI symptoms are resolving. Will do trial of meclizine, 25 mg TID. Prescription sent to [facility]. If symptoms persist, recommend immediate care evaluation.

## 2018-09-01 NOTE — TELEPHONE ENCOUNTER
Spoke to pt. Pt states he attempted to call back yesterday but was unable to hold for such a long period of time. C/o dizziness beginning on Thursday. Sometimes feels his ears popping/ unsure if it is liquid in there.  States Friday and today have be

## 2018-09-07 ENCOUNTER — OFFICE VISIT (OUTPATIENT)
Dept: INTERNAL MEDICINE CLINIC | Facility: CLINIC | Age: 63
End: 2018-09-07
Payer: COMMERCIAL

## 2018-09-07 ENCOUNTER — LAB ENCOUNTER (OUTPATIENT)
Dept: LAB | Age: 63
End: 2018-09-07
Attending: INTERNAL MEDICINE
Payer: COMMERCIAL

## 2018-09-07 VITALS
WEIGHT: 251.75 LBS | OXYGEN SATURATION: 97 % | BODY MASS INDEX: 40.46 KG/M2 | HEIGHT: 66 IN | HEART RATE: 83 BPM | TEMPERATURE: 99 F | RESPIRATION RATE: 18 BRPM | SYSTOLIC BLOOD PRESSURE: 122 MMHG | DIASTOLIC BLOOD PRESSURE: 80 MMHG

## 2018-09-07 DIAGNOSIS — R73.03 PRE-DIABETES: ICD-10-CM

## 2018-09-07 DIAGNOSIS — D72.829 LEUKOCYTOSIS, UNSPECIFIED TYPE: ICD-10-CM

## 2018-09-07 DIAGNOSIS — H81.23 VESTIBULAR NEURONITIS OF BOTH EARS: ICD-10-CM

## 2018-09-07 DIAGNOSIS — E11.9 CONTROLLED TYPE 2 DIABETES MELLITUS WITHOUT COMPLICATION, WITHOUT LONG-TERM CURRENT USE OF INSULIN (HCC): ICD-10-CM

## 2018-09-07 DIAGNOSIS — Z12.5 SCREENING FOR PROSTATE CANCER: ICD-10-CM

## 2018-09-07 DIAGNOSIS — R42 VERTIGO: Primary | ICD-10-CM

## 2018-09-07 LAB
BASOPHILS # BLD AUTO: 0.03 X10(3) UL (ref 0–0.1)
BASOPHILS NFR BLD AUTO: 0.3 %
COMPLEXED PSA SERPL-MCNC: 0.98 NG/ML (ref 0.01–4)
EOSINOPHIL # BLD AUTO: 0.31 X10(3) UL (ref 0–0.3)
EOSINOPHIL NFR BLD AUTO: 3 %
ERYTHROCYTE [DISTWIDTH] IN BLOOD BY AUTOMATED COUNT: 14.6 % (ref 11.5–16)
EST. AVERAGE GLUCOSE BLD GHB EST-MCNC: 140 MG/DL (ref 68–126)
HBA1C MFR BLD HPLC: 6.5 % (ref ?–5.7)
HCT VFR BLD AUTO: 46.2 % (ref 37–53)
HGB BLD-MCNC: 14.5 G/DL (ref 13–17)
IMMATURE GRANULOCYTE COUNT: 0.03 X10(3) UL (ref 0–1)
IMMATURE GRANULOCYTE RATIO %: 0.3 %
LYMPHOCYTES # BLD AUTO: 2.99 X10(3) UL (ref 0.9–4)
LYMPHOCYTES NFR BLD AUTO: 28.6 %
MCH RBC QN AUTO: 26.6 PG (ref 27–33.2)
MCHC RBC AUTO-ENTMCNC: 31.4 G/DL (ref 31–37)
MCV RBC AUTO: 84.6 FL (ref 80–99)
MONOCYTES # BLD AUTO: 0.94 X10(3) UL (ref 0.1–1)
MONOCYTES NFR BLD AUTO: 9 %
NEUTROPHIL ABS PRELIM: 6.15 X10 (3) UL (ref 1.3–6.7)
NEUTROPHILS # BLD AUTO: 6.15 X10(3) UL (ref 1.3–6.7)
NEUTROPHILS NFR BLD AUTO: 58.8 %
PLATELET # BLD AUTO: 392 10(3)UL (ref 150–450)
RBC # BLD AUTO: 5.46 X10(6)UL (ref 4.3–5.7)
RED CELL DISTRIBUTION WIDTH-SD: 44.8 FL (ref 35.1–46.3)
WBC # BLD AUTO: 10.5 X10(3) UL (ref 4–13)

## 2018-09-07 PROCEDURE — 83036 HEMOGLOBIN GLYCOSYLATED A1C: CPT

## 2018-09-07 PROCEDURE — 36415 COLL VENOUS BLD VENIPUNCTURE: CPT

## 2018-09-07 PROCEDURE — 85025 COMPLETE CBC W/AUTO DIFF WBC: CPT

## 2018-09-07 PROCEDURE — 99214 OFFICE O/P EST MOD 30 MIN: CPT | Performed by: INTERNAL MEDICINE

## 2018-09-07 RX ORDER — LEVOTHYROXINE SODIUM 112 UG/1
112 TABLET ORAL
Qty: 90 TABLET | Refills: 3 | Status: SHIPPED | OUTPATIENT
Start: 2018-09-07 | End: 2019-09-09

## 2018-09-07 RX ORDER — FLUTICASONE PROPIONATE 50 MCG
2 SPRAY, SUSPENSION (ML) NASAL DAILY
Qty: 1 BOTTLE | Refills: 3 | Status: SHIPPED | OUTPATIENT
Start: 2018-09-07 | End: 2019-09-02

## 2018-09-07 RX ORDER — CARVEDILOL 6.25 MG/1
6.25 TABLET ORAL 2 TIMES DAILY
Refills: 2 | COMMUNITY
Start: 2018-07-26 | End: 2019-04-16

## 2018-09-07 RX ORDER — ATORVASTATIN CALCIUM 20 MG/1
20 TABLET, FILM COATED ORAL EVERY EVENING
Refills: 2 | COMMUNITY
Start: 2018-08-28 | End: 2019-04-16 | Stop reason: SINTOL

## 2018-09-07 NOTE — PROGRESS NOTES
[facility] Internal Medicine Office Note  Chief Complaint:   Patient presents with:  Dizziness:  Began about 1 week ago after [place] from [place].  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  Disp: 1 Bottle Rfl: 3   Levothyroxine Sodium 112 MCG Oral Tab Take 1 tablet (112 mcg total) by mouth before breakfast. Disp: 90 tablet Rfl: 3   Meclizine HCl 25 MG Oral Tab Take 1 tablet (25 mg total) by mouth 3 (three) frances reviewed. Constitutional: He appears well-developed. No distress. HENT:   Head: Normocephalic and atraumatic. Right Ear: Tympanic membrane is not erythematous. Left Ear: Tympanic membrane is not erythematous. Mouth/Throat: No pharynx erythema. total) by mouth before breakfast.      Orders Placed This Encounter      PSA (Screening) [E]      Hemoglobin A1C [E]      CBC W Differential W Platelet [E]    Meds & Refills for this Visit:  Signed Prescriptions Disp Refills    Fluticasone Propionate 50 MC

## 2018-09-10 ENCOUNTER — TELEPHONE (OUTPATIENT)
Dept: INTERNAL MEDICINE CLINIC | Facility: CLINIC | Age: 63
End: 2018-09-10

## 2018-09-10 NOTE — TELEPHONE ENCOUNTER
Notes recorded by Philly Faustin MD on 9/8/2018 at 4:13 PM CDT  a1c is higher at 6.5, in diabetes range. Recommend starting metformin 500mg once daily and f/u in 1 month. Please send 30 day supply with 1 refill    Rx sent to pharmacy.   Pharmacy verified to

## 2018-09-27 NOTE — TELEPHONE ENCOUNTER
Refill requested:   Requested Prescriptions     Pending Prescriptions Disp Refills   • METFORMIN  MG Oral Tab [Pharmacy Med Name: MetFORMIN HCl Oral Tablet 500 MG] 60 tablet 0     Sig: TAKE 1 TABLET BY MOUTH IN THE MORNING with breakfast       Passe

## 2018-12-06 NOTE — TELEPHONE ENCOUNTER
Last office visit 9/7/18 with Dr. Kodi Pagan. Last labs 9/7/18. Last refill 9/27/18 (#60,0 refills).

## 2019-02-28 VITALS
HEART RATE: 68 BPM | HEIGHT: 66 IN | BODY MASS INDEX: 39.86 KG/M2 | DIASTOLIC BLOOD PRESSURE: 70 MMHG | SYSTOLIC BLOOD PRESSURE: 106 MMHG | WEIGHT: 248 LBS

## 2019-04-06 ENCOUNTER — TELEPHONE (OUTPATIENT)
Dept: INTERNAL MEDICINE CLINIC | Facility: CLINIC | Age: 64
End: 2019-04-06

## 2019-04-06 NOTE — TELEPHONE ENCOUNTER
Please call patient to schedule physical.  -He is also due for diabetic eye exam. He can follow up with [facility] in [place] at [phone] if he does not have an ophthalmologist. No

## 2019-04-09 NOTE — TELEPHONE ENCOUNTER
Spoke patient and he set up his annual physical on 04/23/19. Advised patient that he is due for his diabetic eye exam and info given to patient.

## 2019-04-16 ENCOUNTER — OFFICE VISIT (OUTPATIENT)
Dept: INTERNAL MEDICINE CLINIC | Facility: CLINIC | Age: 64
End: 2019-04-16
Payer: COMMERCIAL

## 2019-04-16 VITALS
TEMPERATURE: 98 F | HEIGHT: 66 IN | SYSTOLIC BLOOD PRESSURE: 124 MMHG | DIASTOLIC BLOOD PRESSURE: 70 MMHG | BODY MASS INDEX: 40.58 KG/M2 | WEIGHT: 252.5 LBS | HEART RATE: 68 BPM | RESPIRATION RATE: 16 BRPM | OXYGEN SATURATION: 98 %

## 2019-04-16 DIAGNOSIS — R04.0 LEFT-SIDED EPISTAXIS: ICD-10-CM

## 2019-04-16 DIAGNOSIS — E11.9 TYPE 2 DIABETES MELLITUS WITHOUT COMPLICATION, WITHOUT LONG-TERM CURRENT USE OF INSULIN (HCC): ICD-10-CM

## 2019-04-16 DIAGNOSIS — Z00.00 LABORATORY EXAMINATION ORDERED AS PART OF A ROUTINE GENERAL MEDICAL EXAMINATION: ICD-10-CM

## 2019-04-16 DIAGNOSIS — J06.9 VIRAL UPPER RESPIRATORY TRACT INFECTION: Primary | ICD-10-CM

## 2019-04-16 PROCEDURE — 99214 OFFICE O/P EST MOD 30 MIN: CPT | Performed by: NURSE PRACTITIONER

## 2019-04-16 RX ORDER — MOMETASONE FUROATE 1 MG/G
1 CREAM TOPICAL DAILY
Refills: 0 | COMMUNITY
Start: 2019-03-18 | End: 2019-09-19

## 2019-04-16 NOTE — PROGRESS NOTES
CHIEF COMPLAINT:   Patient presents with:  Nasal Congestion      HPI:   Mikhail Roberson is a 59year old male who presents for upper respiratory symptoms for  3 days. Patient reports nasal congestion, PND since Saturday.  Had cough initially, but has now re • COLONOSCOPY     • HEMORRHOIDECTOMY     • HERNIA SURGERY     • XI ROBOT-ASSISTED LAPAROSCOPIC INGUINAL HERNIA REPAIR Left 12/7/2017    Performed by John Au MD at [facility] MAIN OR         Social History    Tobacco Use      Smoking status: Never Smok per day. I also instructed them to use a cool-mist vaporizer. 3. Type 2 diabetes mellitus without complication, without long-term current use of insulin (HCC)  - HEMOGLOBIN A1C; Future  - LIPID PANEL; Future  - COMP METABOLIC PANEL (14);  Future  - MICR

## 2019-04-23 ENCOUNTER — LAB ENCOUNTER (OUTPATIENT)
Dept: LAB | Age: 64
End: 2019-04-23
Attending: INTERNAL MEDICINE
Payer: COMMERCIAL

## 2019-04-23 ENCOUNTER — OFFICE VISIT (OUTPATIENT)
Dept: INTERNAL MEDICINE CLINIC | Facility: CLINIC | Age: 64
End: 2019-04-23
Payer: COMMERCIAL

## 2019-04-23 VITALS
BODY MASS INDEX: 40.82 KG/M2 | OXYGEN SATURATION: 97 % | RESPIRATION RATE: 16 BRPM | DIASTOLIC BLOOD PRESSURE: 78 MMHG | TEMPERATURE: 98 F | WEIGHT: 254 LBS | HEART RATE: 88 BPM | HEIGHT: 66 IN | SYSTOLIC BLOOD PRESSURE: 140 MMHG

## 2019-04-23 DIAGNOSIS — E11.9 TYPE 2 DIABETES MELLITUS WITHOUT COMPLICATION, WITHOUT LONG-TERM CURRENT USE OF INSULIN (HCC): ICD-10-CM

## 2019-04-23 DIAGNOSIS — Z00.00 ENCOUNTER FOR ROUTINE ADULT MEDICAL EXAMINATION: Primary | ICD-10-CM

## 2019-04-23 DIAGNOSIS — E55.9 HYPOVITAMINOSIS D: ICD-10-CM

## 2019-04-23 DIAGNOSIS — D22.9 CHANGE IN MOLE: ICD-10-CM

## 2019-04-23 DIAGNOSIS — Z00.00 LABORATORY EXAM ORDERED AS PART OF ROUTINE GENERAL MEDICAL EXAMINATION: ICD-10-CM

## 2019-04-23 DIAGNOSIS — E53.8 B12 DEFICIENCY: ICD-10-CM

## 2019-04-23 DIAGNOSIS — R06.83 SNORING: ICD-10-CM

## 2019-04-23 DIAGNOSIS — E78.1 HYPERTRIGLYCERIDEMIA: ICD-10-CM

## 2019-04-23 PROCEDURE — 82607 VITAMIN B-12: CPT

## 2019-04-23 PROCEDURE — 83036 HEMOGLOBIN GLYCOSYLATED A1C: CPT

## 2019-04-23 PROCEDURE — 82043 UR ALBUMIN QUANTITATIVE: CPT

## 2019-04-23 PROCEDURE — 82306 VITAMIN D 25 HYDROXY: CPT

## 2019-04-23 PROCEDURE — 99396 PREV VISIT EST AGE 40-64: CPT | Performed by: INTERNAL MEDICINE

## 2019-04-23 PROCEDURE — 84443 ASSAY THYROID STIM HORMONE: CPT

## 2019-04-23 PROCEDURE — 80061 LIPID PANEL: CPT

## 2019-04-23 PROCEDURE — 80053 COMPREHEN METABOLIC PANEL: CPT

## 2019-04-23 PROCEDURE — 82570 ASSAY OF URINE CREATININE: CPT

## 2019-04-23 PROCEDURE — 85025 COMPLETE CBC W/AUTO DIFF WBC: CPT

## 2019-04-23 PROCEDURE — 36415 COLL VENOUS BLD VENIPUNCTURE: CPT

## 2019-04-23 NOTE — PROGRESS NOTES
[facility] Internal Medicine Office Note  Chief Complaint:   Patient presents with:  CPX      HPI:   This is a 59year old male coming in for physical and DM check   HPI    DM - not on medication.    He had side effects with metformin and notes th Smoker      Smokeless tobacco: Never Used    Alcohol use: No    Drug use: No    Allergies:    Atorvastatin            NAUSEA ONLY, DIZZINESS  Seasonal                ITCHING    Current Outpatient Medications:  Cholecalciferol (VITAMIN D) 1000 units Oral Ta Ht 66\"   Wt 254 lb   SpO2 97%   BMI 41.00 kg/m²  Estimated body mass index is 41 kg/m² as calculated from the following:    Height as of this encounter: 66\". Weight as of this encounter: 254 lb. Vital signs reviewed.  Appears stated age, well groomed ([facility] 75.)  -     HEMOGLOBIN A1C; Future  -     MICROALB/CREAT RATIO, RANDOM URINE;  Future    Hypertriglyceridemia - check labs    Hypovitaminosis D -recheck  -     VITAMIN D, 25-HYDROXY; Future    B12 deficiency -recheck  -     VITAMIN B12; Future    Snoring -

## 2019-04-23 NOTE — PATIENT INSTRUCTIONS
Blood work     -Shingrix shingles vaccine recommended     -Prevnar pneumonia vaccine recommended     Increase exercise with goal of 20 min/day on most days of the week     Follow up with dermatology - Dr. Barrie [place] in [place] or   Dr. General Noe [phone]

## 2019-04-25 ENCOUNTER — TELEPHONE (OUTPATIENT)
Dept: INTERNAL MEDICINE CLINIC | Facility: CLINIC | Age: 64
End: 2019-04-25

## 2019-04-25 NOTE — TELEPHONE ENCOUNTER
Patient calling regarding last visit with [facility]/ Dr Thien Flores patient. Asked for a nurse callback Aniya Simmonds has questions about what he was seen for. \"  Please call to discuss

## 2019-04-25 NOTE — TELEPHONE ENCOUNTER
Called pt back and he stated he is having some nasal drainage still, ear plugged, and little sleep. Pt was very frustrated on the phone and stated he called yesterday but no one called him back. Advised pt that there is nothing documented in the chart.  Pt

## 2019-04-25 NOTE — TELEPHONE ENCOUNTER
Patient calling in, requesting a call from the Nurse to give him a call to discuss his recent lab results. Please call pt to discuss.

## 2019-04-26 NOTE — TELEPHONE ENCOUNTER
Discussed with patient at 5:40pm  Advised him to review results released to [facility]  a1c ok at 6.3  He can try short course of sudafed for significant congestion. Discussed side effects of elevated blood pressure, palpitations, and tachycardia.  Do not take

## 2019-05-03 ENCOUNTER — TELEPHONE (OUTPATIENT)
Dept: INTERNAL MEDICINE CLINIC | Facility: CLINIC | Age: 64
End: 2019-05-03

## 2019-05-03 DIAGNOSIS — D22.9 CHANGE IN MOLE: Primary | ICD-10-CM

## 2019-05-03 NOTE — TELEPHONE ENCOUNTER
Pt called in stating he needed a referral for Dr. Simon Junior as he has an appointment on Monday. Referral entered. Advised pt he may need to change appointment as it may not be auth by then but would check first thing Monday. Pt verbalized understanding.   C

## 2019-06-11 PROBLEM — G47.33 OSA (OBSTRUCTIVE SLEEP APNEA): Status: ACTIVE | Noted: 2019-06-11

## 2019-08-27 RX ORDER — MOMETASONE FUROATE 1 MG/G
CREAM TOPICAL
Qty: 15 G | Refills: 0 | OUTPATIENT
Start: 2019-08-27

## 2019-09-04 NOTE — TELEPHONE ENCOUNTER
Mometasone 0.1% one a day filled 5-22-18 45 g with 0 refills     LOV 4-23-19   Follow up with dermatology - Dr. Navya Young in [facility] or   Dr. Kassie Anderson [phone] in [place]  No upcoming apt on file   Labs [phone]

## 2019-09-05 NOTE — TELEPHONE ENCOUNTER
Per Dr Nusrat Amezquita, pt will need to schedule an appointment to be evaluated for a refill on the steroid cream.    Pt informed and will call the office back with his schedule in hand.

## 2019-09-05 NOTE — TELEPHONE ENCOUNTER
Per Dr Dylan Sy, pt will need to schedule an appointment to be evaluated for a refill on the steroid cream.    Pt informed and will call the office back with his schedule in hand.

## 2019-09-06 RX ORDER — MOMETASONE FUROATE 1 MG/G
CREAM TOPICAL
Qty: 15 G | Refills: 0 | OUTPATIENT
Start: 2019-09-06

## 2019-09-09 RX ORDER — LEVOTHYROXINE SODIUM 112 UG/1
TABLET ORAL
Qty: 90 TABLET | Refills: 3 | Status: SHIPPED | OUTPATIENT
Start: 2019-09-09 | End: 2020-09-07

## 2019-09-09 NOTE — TELEPHONE ENCOUNTER
Levothyroxine Sodium 112 MCG Oral Tab    Passed Protocol    Last OV relevant to medication: 4/23/2019  Last refill date: 9/7/2019     #/refills: #90 w/ 3 refills   When pt was asked to return for OV: none noted  Upcoming appt/reason: No future appointments

## 2019-09-19 ENCOUNTER — TELEPHONE (OUTPATIENT)
Dept: INTERNAL MEDICINE CLINIC | Facility: CLINIC | Age: 64
End: 2019-09-19

## 2019-09-19 ENCOUNTER — OFFICE VISIT (OUTPATIENT)
Dept: INTERNAL MEDICINE CLINIC | Facility: CLINIC | Age: 64
End: 2019-09-19
Payer: COMMERCIAL

## 2019-09-19 VITALS
TEMPERATURE: 98 F | WEIGHT: 257.25 LBS | DIASTOLIC BLOOD PRESSURE: 76 MMHG | HEART RATE: 76 BPM | SYSTOLIC BLOOD PRESSURE: 130 MMHG | OXYGEN SATURATION: 98 % | RESPIRATION RATE: 16 BRPM | BODY MASS INDEX: 41.34 KG/M2 | HEIGHT: 66 IN

## 2019-09-19 DIAGNOSIS — R53.83 FATIGUE, UNSPECIFIED TYPE: ICD-10-CM

## 2019-09-19 DIAGNOSIS — E11.9 TYPE 2 DIABETES MELLITUS WITHOUT COMPLICATION, WITHOUT LONG-TERM CURRENT USE OF INSULIN (HCC): Primary | ICD-10-CM

## 2019-09-19 DIAGNOSIS — E55.9 HYPOVITAMINOSIS D: ICD-10-CM

## 2019-09-19 DIAGNOSIS — R29.898 RIGHT HAND WEAKNESS: ICD-10-CM

## 2019-09-19 PROCEDURE — 99214 OFFICE O/P EST MOD 30 MIN: CPT | Performed by: INTERNAL MEDICINE

## 2019-09-19 RX ORDER — MOMETASONE FUROATE 1 MG/G
1 CREAM TOPICAL DAILY
Qty: 1 TUBE | Refills: 0 | Status: SHIPPED | OUTPATIENT
Start: 2019-09-19 | End: 2020-11-09

## 2019-09-19 RX ORDER — MOMETASONE FUROATE 1 MG/G
1 CREAM TOPICAL DAILY
Refills: 0 | Status: CANCELLED | OUTPATIENT
Start: 2019-09-19

## 2019-09-19 NOTE — PROGRESS NOTES
[facility] Internal Medicine Office Note  Chief Complaint:   Patient presents with:  Carpal Tunnel Syndrome: consistent pain on Right hand   Medication Request: steroid crea       HPI:   This is a 62year old male coming in for reduced ROM of L h week. Disp:  Rfl:    aspirin 81 MG Oral Tab Take 81 mg by mouth daily. Disp:  Rfl:    Omeprazole 40 MG Oral Capsule Delayed Release Take 1 capsule (40 mg total) by mouth daily.  (Patient taking differently: Take 40 mg by mouth daily as needed.  ) Disp: 30 encounter diagnosis)  Hypovitaminosis D  Fatigue, unspecified type  Right hand weakness      The plan is as follows  Arron was seen today for carpal tunnel syndrome and medication request.    Diagnoses and all orders for this visit:    Type 2 diabetes jay Paola Werner MD

## 2019-09-19 NOTE — TELEPHONE ENCOUNTER
Faxed release of information requesting last year office notes to Dr. Eddie Fox at Fax# [phone]. Copy in blue tickler.

## 2019-09-27 RX ORDER — OMEPRAZOLE 40 MG/1
40 CAPSULE, DELAYED RELEASE ORAL DAILY
Qty: 90 CAPSULE | Refills: 1 | Status: SHIPPED | OUTPATIENT
Start: 2019-09-27 | End: 2021-03-03

## 2019-09-27 NOTE — TELEPHONE ENCOUNTER
Omeprazole 40 mg Oral Capsule Delayed release    Last OV relevant to medication: 9/19/2019    Last refill date: 5/22/2018     #/refills: #30 w/ 5 refills    When pt was asked to return for OV: none noted    Upcoming appt/reason: No future appointments

## 2019-10-01 PROBLEM — M65.341 TRIGGER RING FINGER OF RIGHT HAND: Status: ACTIVE | Noted: 2019-10-01

## 2019-12-13 ENCOUNTER — TELEPHONE (OUTPATIENT)
Dept: INTERNAL MEDICINE CLINIC | Facility: CLINIC | Age: 64
End: 2019-12-13

## 2019-12-13 NOTE — TELEPHONE ENCOUNTER
Pt is having stomach pain he was on zantac before it was taken off the shelf, would like to talk to a nurse.

## 2019-12-13 NOTE — TELEPHONE ENCOUNTER
Metformin 500 mg 1 tab daily filled 12-7-18 90 with 3 refills     LOV 9-19-19   No upcoming apt on file   Labs 10-1-19   HEMOGLOBIN A1c  <5.7 % of total Hgb 6.0High       Called pt and he does take medication and would like a refill

## 2019-12-13 NOTE — TELEPHONE ENCOUNTER
Pt stated that he notices after he eats spicy foods his stomach becomes upset, more so the RUQ. This has been happening for the last 2-3 days but yesterday he ate \"extremly spicy\" chicken and 2-3 hours later developed same s/s along with \"warm ears\".  A

## 2020-03-29 ENCOUNTER — APPOINTMENT (OUTPATIENT)
Dept: GENERAL RADIOLOGY | Age: 65
End: 2020-03-29
Attending: EMERGENCY MEDICINE

## 2020-03-29 ENCOUNTER — HOSPITAL ENCOUNTER (EMERGENCY)
Age: 65
Discharge: HOME | End: 2020-03-29
Attending: EMERGENCY MEDICINE

## 2020-03-29 VITALS
HEIGHT: 66 IN | RESPIRATION RATE: 18 BRPM | WEIGHT: 250 LBS | SYSTOLIC BLOOD PRESSURE: 144 MMHG | BODY MASS INDEX: 40.18 KG/M2 | TEMPERATURE: 98 F | HEART RATE: 78 BPM | DIASTOLIC BLOOD PRESSURE: 44 MMHG | OXYGEN SATURATION: 97 %

## 2020-03-29 DIAGNOSIS — R53.83 FATIGUE, UNSPECIFIED TYPE: ICD-10-CM

## 2020-03-29 DIAGNOSIS — R00.2 PALPITATIONS: Primary | ICD-10-CM

## 2020-03-29 LAB
ALBUMIN SERPL-MCNC: 3.7 G/DL (ref 3.4–5)
ALBUMIN/GLOB SERPL: 0.8 {RATIO} (ref 1–2)
ALP LIVER SERPL-CCNC: 99 U/L (ref 45–117)
ALT SERPL-CCNC: 20 U/L (ref 16–61)
ANION GAP SERPL CALC-SCNC: 8 MMOL/L (ref 0–18)
AST SERPL-CCNC: 14 U/L (ref 15–37)
BASOPHILS # BLD AUTO: 0.02 X10(3) UL (ref 0–0.2)
BASOPHILS NFR BLD AUTO: 0.3 %
BILIRUB SERPL-MCNC: 0.4 MG/DL (ref 0.1–2)
BILIRUB UR QL STRIP.AUTO: NEGATIVE
BUN BLD-MCNC: 17 MG/DL (ref 7–18)
BUN/CREAT SERPL: 14.4 (ref 10–20)
CALCIUM BLD-MCNC: 9.3 MG/DL (ref 8.5–10.1)
CHLORIDE SERPL-SCNC: 102 MMOL/L (ref 98–112)
CLARITY UR REFRACT.AUTO: CLEAR
CO2 SERPL-SCNC: 24 MMOL/L (ref 21–32)
COLOR UR AUTO: YELLOW
CREAT BLD-MCNC: 1.18 MG/DL (ref 0.7–1.3)
D-DIMER: 0.29 UG/ML FEU (ref ?–0.63)
DEPRECATED RDW RBC AUTO: 43.3 FL (ref 35.1–46.3)
EOSINOPHIL # BLD AUTO: 0.09 X10(3) UL (ref 0–0.7)
EOSINOPHIL NFR BLD AUTO: 1.3 %
ERYTHROCYTE [DISTWIDTH] IN BLOOD BY AUTOMATED COUNT: 14.5 % (ref 11–15)
GLOBULIN PLAS-MCNC: 4.4 G/DL (ref 2.8–4.4)
GLUCOSE BLD-MCNC: 147 MG/DL (ref 70–99)
GLUCOSE UR STRIP.AUTO-MCNC: NEGATIVE MG/DL
HCT VFR BLD AUTO: 44.3 % (ref 39–53)
HGB BLD-MCNC: 14.4 G/DL (ref 13–17.5)
IMM GRANULOCYTES # BLD AUTO: 0.01 X10(3) UL (ref 0–1)
IMM GRANULOCYTES NFR BLD: 0.1 %
KETONES UR STRIP.AUTO-MCNC: NEGATIVE MG/DL
LEUKOCYTE ESTERASE UR QL STRIP.AUTO: NEGATIVE
LYMPHOCYTES # BLD AUTO: 2.31 X10(3) UL (ref 1–4)
LYMPHOCYTES NFR BLD AUTO: 33.2 %
M PROTEIN MFR SERPL ELPH: 8.1 G/DL (ref 6.4–8.2)
MCH RBC QN AUTO: 26.8 PG (ref 26–34)
MCHC RBC AUTO-ENTMCNC: 32.5 G/DL (ref 31–37)
MCV RBC AUTO: 82.3 FL (ref 80–100)
MONOCYTES # BLD AUTO: 0.63 X10(3) UL (ref 0.1–1)
MONOCYTES NFR BLD AUTO: 9.1 %
NEUTROPHILS # BLD AUTO: 3.9 X10 (3) UL (ref 1.5–7.7)
NEUTROPHILS # BLD AUTO: 3.9 X10(3) UL (ref 1.5–7.7)
NEUTROPHILS NFR BLD AUTO: 56 %
NITRITE UR QL STRIP.AUTO: NEGATIVE
NT-PROBNP SERPL-MCNC: 28 PG/ML (ref ?–125)
OSMOLALITY SERPL CALC.SUM OF ELEC: 282 MOSM/KG (ref 275–295)
PH UR STRIP.AUTO: 5.5 [PH] (ref 4.5–8)
PLATELET # BLD AUTO: 286 10(3)UL (ref 150–450)
POTASSIUM SERPL-SCNC: 4 MMOL/L (ref 3.5–5.1)
PROT UR STRIP.AUTO-MCNC: NEGATIVE MG/DL
RBC # BLD AUTO: 5.38 X10(6)UL (ref 4.3–5.7)
RBC UR QL AUTO: NEGATIVE
SODIUM SERPL-SCNC: 134 MMOL/L (ref 136–145)
SP GR UR STRIP.AUTO: <=1.005 (ref 1–1.03)
TROPONIN I SERPL-MCNC: <0.045 NG/ML (ref ?–0.04)
TSI SER-ACNC: 1.21 MIU/ML (ref 0.36–3.74)
UROBILINOGEN UR STRIP.AUTO-MCNC: 0.2 MG/DL
WBC # BLD AUTO: 7 X10(3) UL (ref 4–11)

## 2020-03-29 PROCEDURE — 84484 ASSAY OF TROPONIN QUANT: CPT | Performed by: EMERGENCY MEDICINE

## 2020-03-29 PROCEDURE — 36415 COLL VENOUS BLD VENIPUNCTURE: CPT

## 2020-03-29 PROCEDURE — 93010 ELECTROCARDIOGRAM REPORT: CPT

## 2020-03-29 PROCEDURE — 83880 ASSAY OF NATRIURETIC PEPTIDE: CPT | Performed by: EMERGENCY MEDICINE

## 2020-03-29 PROCEDURE — 99284 EMERGENCY DEPT VISIT MOD MDM: CPT

## 2020-03-29 PROCEDURE — 93005 ELECTROCARDIOGRAM TRACING: CPT

## 2020-03-29 PROCEDURE — 85025 COMPLETE CBC W/AUTO DIFF WBC: CPT | Performed by: EMERGENCY MEDICINE

## 2020-03-29 PROCEDURE — 85379 FIBRIN DEGRADATION QUANT: CPT | Performed by: EMERGENCY MEDICINE

## 2020-03-29 PROCEDURE — 81003 URINALYSIS AUTO W/O SCOPE: CPT | Performed by: EMERGENCY MEDICINE

## 2020-03-29 PROCEDURE — 99285 EMERGENCY DEPT VISIT HI MDM: CPT

## 2020-03-29 PROCEDURE — 71045 X-RAY EXAM CHEST 1 VIEW: CPT | Performed by: EMERGENCY MEDICINE

## 2020-03-29 PROCEDURE — 80053 COMPREHEN METABOLIC PANEL: CPT | Performed by: EMERGENCY MEDICINE

## 2020-03-29 PROCEDURE — 84443 ASSAY THYROID STIM HORMONE: CPT | Performed by: EMERGENCY MEDICINE

## 2020-03-29 NOTE — ED INITIAL ASSESSMENT (HPI)
C/o irregular heart beat since 11 am today. Elevated BP. No chest pain. + SOB. No cough/fever. Also c/o left leg/knee pain.

## 2020-03-30 LAB
ATRIAL RATE: 79 BPM
P AXIS: 46 DEGREES
P-R INTERVAL: 160 MS
Q-T INTERVAL: 386 MS
QRS DURATION: 104 MS
QTC CALCULATION (BEZET): 442 MS
R AXIS: 19 DEGREES
T AXIS: 43 DEGREES
VENTRICULAR RATE: 79 BPM

## 2020-04-01 ENCOUNTER — TELEPHONE (OUTPATIENT)
Dept: INTERNAL MEDICINE CLINIC | Facility: CLINIC | Age: 65
End: 2020-04-01

## 2020-04-01 NOTE — TELEPHONE ENCOUNTER
Recommend TE-appt, for MD to call back re lab results. If pt agreeable need to inquire to Dr Dina Santana day and time of appt.

## 2020-04-01 NOTE — TELEPHONE ENCOUNTER
Appointment scheduled for tele visit    Future Appointments   Date Time Provider Lelo Richards   4/2/2020 10:30 AM Cindy House MD [facility] [place]

## 2020-04-01 NOTE — TELEPHONE ENCOUNTER
Patient agreed to set up tele visit if recommended by Dr. David Bean. Please advise which day/time      Patient gave verbal consent for telephone visit. Patient is aware that charges will apply for these services.  Yani Cain verified

## 2020-04-01 NOTE — TELEPHONE ENCOUNTER
Pt would like a call from pcp syays he was in the ER and had labs done would like to go over results

## 2020-04-02 NOTE — PROGRESS NOTES
Virtual/Telephone Check-In    Arron Wells verbally consents to a Virtual/Telephone Check-In service on 04/02/20. Patient understands and accepts financial responsibility for any deductible, co-insurance and/or co-pays associated with this service.

## 2020-09-08 RX ORDER — LEVOTHYROXINE SODIUM 112 UG/1
112 TABLET ORAL
Qty: 90 TABLET | Refills: 3 | Status: SHIPPED | OUTPATIENT
Start: 2020-09-08 | End: 2021-09-20

## 2020-09-08 NOTE — TELEPHONE ENCOUNTER
LVM for pt to give office a call back   Pt due for cpx     Levothyroxine Sodium 112 MCG Oral Tab  Protocol Passed     LOV: 4/2/2020   Hypothyroidism - he noted he changed pharmacies and it was a different .   Discussed latest result of TSH 1.21

## 2020-11-09 RX ORDER — MOMETASONE FUROATE 1 MG/G
CREAM TOPICAL
Qty: 15 G | Refills: 1 | Status: SHIPPED | OUTPATIENT
Start: 2020-11-09 | End: 2021-07-16

## 2020-12-31 ENCOUNTER — OFFICE VISIT (OUTPATIENT)
Dept: INTERNAL MEDICINE CLINIC | Facility: CLINIC | Age: 65
End: 2020-12-31
Payer: COMMERCIAL

## 2020-12-31 ENCOUNTER — LAB ENCOUNTER (OUTPATIENT)
Dept: LAB | Age: 65
End: 2020-12-31
Attending: PHYSICIAN ASSISTANT
Payer: COMMERCIAL

## 2020-12-31 VITALS
OXYGEN SATURATION: 99 % | HEART RATE: 74 BPM | TEMPERATURE: 99 F | HEIGHT: 66 IN | BODY MASS INDEX: 40.31 KG/M2 | WEIGHT: 250.81 LBS | SYSTOLIC BLOOD PRESSURE: 130 MMHG | DIASTOLIC BLOOD PRESSURE: 82 MMHG | RESPIRATION RATE: 18 BRPM

## 2020-12-31 DIAGNOSIS — E03.9 HYPOTHYROIDISM, UNSPECIFIED TYPE: ICD-10-CM

## 2020-12-31 DIAGNOSIS — Z00.00 LABORATORY EXAM ORDERED AS PART OF ROUTINE GENERAL MEDICAL EXAMINATION: ICD-10-CM

## 2020-12-31 DIAGNOSIS — R73.03 PRE-DIABETES: ICD-10-CM

## 2020-12-31 DIAGNOSIS — I25.10 CORONARY ARTERY DISEASE INVOLVING NATIVE CORONARY ARTERY OF NATIVE HEART WITHOUT ANGINA PECTORIS: ICD-10-CM

## 2020-12-31 DIAGNOSIS — H81.11 BENIGN PAROXYSMAL POSITIONAL VERTIGO OF RIGHT EAR: ICD-10-CM

## 2020-12-31 DIAGNOSIS — H81.11 BENIGN PAROXYSMAL POSITIONAL VERTIGO OF RIGHT EAR: Primary | ICD-10-CM

## 2020-12-31 DIAGNOSIS — I10 BENIGN ESSENTIAL HYPERTENSION: ICD-10-CM

## 2020-12-31 DIAGNOSIS — E66.01 MORBID OBESITY WITH BMI OF 40.0-44.9, ADULT (HCC): ICD-10-CM

## 2020-12-31 PROBLEM — R10.31 RLQ ABDOMINAL PAIN: Status: RESOLVED | Noted: 2017-10-03 | Resolved: 2020-12-31

## 2020-12-31 PROBLEM — K40.20 BILATERAL INGUINAL HERNIA WITHOUT OBSTRUCTION OR GANGRENE: Status: RESOLVED | Noted: 2017-12-19 | Resolved: 2020-12-31

## 2020-12-31 LAB
ALBUMIN SERPL-MCNC: 3.8 G/DL (ref 3.4–5)
ALBUMIN/GLOB SERPL: 1 {RATIO} (ref 1–2)
ALP LIVER SERPL-CCNC: 101 U/L
ALT SERPL-CCNC: 23 U/L
ANION GAP SERPL CALC-SCNC: 3 MMOL/L (ref 0–18)
AST SERPL-CCNC: 18 U/L (ref 15–37)
BASOPHILS # BLD AUTO: 0.02 X10(3) UL (ref 0–0.2)
BASOPHILS NFR BLD AUTO: 0.2 %
BILIRUB SERPL-MCNC: 0.6 MG/DL (ref 0.1–2)
BUN BLD-MCNC: 15 MG/DL (ref 7–18)
BUN/CREAT SERPL: 13.4 (ref 10–20)
CALCIUM BLD-MCNC: 9.1 MG/DL (ref 8.5–10.1)
CHLORIDE SERPL-SCNC: 105 MMOL/L (ref 98–112)
CHOLEST SMN-MCNC: 189 MG/DL (ref ?–200)
CO2 SERPL-SCNC: 27 MMOL/L (ref 21–32)
COMPLEXED PSA SERPL-MCNC: 0.97 NG/ML (ref ?–4)
CREAT BLD-MCNC: 1.12 MG/DL
DEPRECATED RDW RBC AUTO: 43.5 FL (ref 35.1–46.3)
EOSINOPHIL # BLD AUTO: 0.09 X10(3) UL (ref 0–0.7)
EOSINOPHIL NFR BLD AUTO: 1.1 %
ERYTHROCYTE [DISTWIDTH] IN BLOOD BY AUTOMATED COUNT: 14.6 % (ref 11–15)
EST. AVERAGE GLUCOSE BLD GHB EST-MCNC: 131 MG/DL (ref 68–126)
GLOBULIN PLAS-MCNC: 3.8 G/DL (ref 2.8–4.4)
GLUCOSE BLD-MCNC: 90 MG/DL (ref 70–99)
HBA1C MFR BLD HPLC: 6.2 % (ref ?–5.7)
HCT VFR BLD AUTO: 42.7 %
HCV AB SERPL QL IA: NONREACTIVE
HDLC SERPL-MCNC: 35 MG/DL (ref 40–59)
HGB BLD-MCNC: 14.1 G/DL
IMM GRANULOCYTES # BLD AUTO: 0.02 X10(3) UL (ref 0–1)
IMM GRANULOCYTES NFR BLD: 0.2 %
LDLC SERPL CALC-MCNC: 124 MG/DL (ref ?–100)
LYMPHOCYTES # BLD AUTO: 2.81 X10(3) UL (ref 1–4)
LYMPHOCYTES NFR BLD AUTO: 35 %
M PROTEIN MFR SERPL ELPH: 7.6 G/DL (ref 6.4–8.2)
MCH RBC QN AUTO: 27.1 PG (ref 26–34)
MCHC RBC AUTO-ENTMCNC: 33 G/DL (ref 31–37)
MCV RBC AUTO: 82 FL
MONOCYTES # BLD AUTO: 0.64 X10(3) UL (ref 0.1–1)
MONOCYTES NFR BLD AUTO: 8 %
NEUTROPHILS # BLD AUTO: 4.44 X10 (3) UL (ref 1.5–7.7)
NEUTROPHILS # BLD AUTO: 4.44 X10(3) UL (ref 1.5–7.7)
NEUTROPHILS NFR BLD AUTO: 55.5 %
NONHDLC SERPL-MCNC: 154 MG/DL (ref ?–130)
OSMOLALITY SERPL CALC.SUM OF ELEC: 280 MOSM/KG (ref 275–295)
PATIENT FASTING Y/N/NP: YES
PATIENT FASTING Y/N/NP: YES
PLATELET # BLD AUTO: 275 10(3)UL (ref 150–450)
POTASSIUM SERPL-SCNC: 4.3 MMOL/L (ref 3.5–5.1)
RBC # BLD AUTO: 5.21 X10(6)UL
SODIUM SERPL-SCNC: 135 MMOL/L (ref 136–145)
TRIGL SERPL-MCNC: 152 MG/DL (ref 30–149)
TSI SER-ACNC: 0.82 MIU/ML (ref 0.36–3.74)
VLDLC SERPL CALC-MCNC: 30 MG/DL (ref 0–30)
WBC # BLD AUTO: 8 X10(3) UL (ref 4–11)

## 2020-12-31 PROCEDURE — 80061 LIPID PANEL: CPT

## 2020-12-31 PROCEDURE — 80053 COMPREHEN METABOLIC PANEL: CPT

## 2020-12-31 PROCEDURE — 86803 HEPATITIS C AB TEST: CPT

## 2020-12-31 PROCEDURE — 3079F DIAST BP 80-89 MM HG: CPT | Performed by: PHYSICIAN ASSISTANT

## 2020-12-31 PROCEDURE — 3008F BODY MASS INDEX DOCD: CPT | Performed by: PHYSICIAN ASSISTANT

## 2020-12-31 PROCEDURE — 84443 ASSAY THYROID STIM HORMONE: CPT

## 2020-12-31 PROCEDURE — 36415 COLL VENOUS BLD VENIPUNCTURE: CPT

## 2020-12-31 PROCEDURE — 83036 HEMOGLOBIN GLYCOSYLATED A1C: CPT

## 2020-12-31 PROCEDURE — 3075F SYST BP GE 130 - 139MM HG: CPT | Performed by: PHYSICIAN ASSISTANT

## 2020-12-31 PROCEDURE — 85025 COMPLETE CBC W/AUTO DIFF WBC: CPT

## 2020-12-31 PROCEDURE — 99214 OFFICE O/P EST MOD 30 MIN: CPT | Performed by: PHYSICIAN ASSISTANT

## 2020-12-31 NOTE — PROGRESS NOTES
Arron Silver is a 60year old male. HPI:   Patient presents for eval of dizziness. C/o sudden onset of sensation that the room was spinning two days ago. Lasted a couple minutes and recurred following day. Feeling much better today.   Dizziness wo Pulse 74   Temp 98.5 °F (36.9 °C) (Oral)   Resp 18   Ht 5' 6\" (1.676 m)   Wt 250 lb 12.8 oz (113.8 kg)   SpO2 99%   BMI 40.48 kg/m²   GENERAL: well developed, well nourished, in no acute distress  SKIN: no rashes, no suspicious lesions  HEENT: normocepha

## 2020-12-31 NOTE — PATIENT INSTRUCTIONS
Vertigo:  - start home exercises  - may use meclizine 25 mg - 1 tablet every 8 hours as needed for dizziness (*may cause drowsiness)    Please seek attention if any symptoms changing or worsening.     Follow up visit with Dr. Paola Werner in January for your well

## 2021-01-04 ENCOUNTER — TELEPHONE (OUTPATIENT)
Dept: INTERNAL MEDICINE CLINIC | Facility: CLINIC | Age: 66
End: 2021-01-04

## 2021-01-04 DIAGNOSIS — E78.5 HYPERLIPIDEMIA, UNSPECIFIED HYPERLIPIDEMIA TYPE: Primary | ICD-10-CM

## 2021-01-04 NOTE — TELEPHONE ENCOUNTER
a1c has increased a bit (6.0 to 6.2) and continues to be in pre-DM range. Cholesterol is elevated -- is patient on statin? If not, rec starting atorvastatin 10 nightly. Watch for muscle aches. Repeat FLP, AST, ALT in 3 months.   If already on statin p

## 2021-01-05 RX ORDER — ROSUVASTATIN CALCIUM 5 MG/1
5 TABLET, COATED ORAL NIGHTLY
Qty: 90 TABLET | Refills: 1 | Status: SHIPPED | OUTPATIENT
Start: 2021-01-05 | End: 2021-01-21

## 2021-01-05 NOTE — TELEPHONE ENCOUNTER
Rx for rosuvastatin sent to pharmacy. Pt to let me know if he develops SEs. Repeat labs in 3 months.

## 2021-01-05 NOTE — TELEPHONE ENCOUNTER
Pt notified of lab results and provider instructions. Pt states that he tried atorvastatin a few years ago and experienced nausea, body aches and dizziness. Would like recommendations on another cholesterol med. Please send to [facility].

## 2021-01-14 ENCOUNTER — TELEPHONE (OUTPATIENT)
Dept: INTERNAL MEDICINE CLINIC | Facility: CLINIC | Age: 66
End: 2021-01-14

## 2021-01-14 NOTE — TELEPHONE ENCOUNTER
Ok to stop medication and see if symptoms improve. Send me update in 1-2 weeks. If symptoms do not improve or if they are worsening or changing rec video visit as some of her symptoms are c/w possible COVID infection.

## 2021-01-14 NOTE — TELEPHONE ENCOUNTER
Spoke with patient stating has been taking Rosuvastatin for about a week and has symptoms, left should pain, muscle aches, weakness, headaches, lightheaded, no chest pain, no SOB, he was unable to sleep last night, only about an hour. Please advise.

## 2021-01-14 NOTE — TELEPHONE ENCOUNTER
Patient is requesting to speak to a nurse, currently taking Rosuvastatin Calcium 5mg 1 tab nightly. Has been having some side effects weakness, muscle ache and headaches. Patient is requesting a call back to get advise.

## 2021-01-18 NOTE — TELEPHONE ENCOUNTER
Pt states that he ate almonds with salt/vinegar today. He began to feel his R ear get warm along with dizziness. Took BP which was 168/95. Decided to recheck and BP was 190/120. Pt spoke with wife whom instructed him to drink water and lay down.      Pt

## 2021-01-18 NOTE — TELEPHONE ENCOUNTER
Pt dc medication as advised below, last dose approx 4-5 days ago. Now with elevated bp, currently 190/120. All other sxs as previuosly stated below resolved. States BP was previously always stable.

## 2021-01-19 NOTE — TELEPHONE ENCOUNTER
Future Appointments   Date Time Provider Lelo Susie   1/21/2021  2:00 PM Tara Astudillo MD [facility] [place]

## 2021-01-21 ENCOUNTER — OFFICE VISIT (OUTPATIENT)
Dept: INTERNAL MEDICINE CLINIC | Facility: CLINIC | Age: 66
End: 2021-01-21
Payer: COMMERCIAL

## 2021-01-21 VITALS
RESPIRATION RATE: 18 BRPM | WEIGHT: 249 LBS | BODY MASS INDEX: 40.02 KG/M2 | SYSTOLIC BLOOD PRESSURE: 118 MMHG | TEMPERATURE: 98 F | HEIGHT: 66 IN | OXYGEN SATURATION: 99 % | DIASTOLIC BLOOD PRESSURE: 60 MMHG | HEART RATE: 92 BPM

## 2021-01-21 DIAGNOSIS — R42 VERTIGO: ICD-10-CM

## 2021-01-21 DIAGNOSIS — E78.2 MIXED HYPERLIPIDEMIA: ICD-10-CM

## 2021-01-21 DIAGNOSIS — R07.9 INTERMITTENT CHEST PAIN: Primary | ICD-10-CM

## 2021-01-21 DIAGNOSIS — F41.9 ANXIETY: ICD-10-CM

## 2021-01-21 PROCEDURE — 99215 OFFICE O/P EST HI 40 MIN: CPT | Performed by: INTERNAL MEDICINE

## 2021-01-21 PROCEDURE — 3074F SYST BP LT 130 MM HG: CPT | Performed by: INTERNAL MEDICINE

## 2021-01-21 PROCEDURE — 3008F BODY MASS INDEX DOCD: CPT | Performed by: INTERNAL MEDICINE

## 2021-01-21 PROCEDURE — 3078F DIAST BP <80 MM HG: CPT | Performed by: INTERNAL MEDICINE

## 2021-01-21 PROCEDURE — 93000 ELECTROCARDIOGRAM COMPLETE: CPT | Performed by: INTERNAL MEDICINE

## 2021-01-21 RX ORDER — ESCITALOPRAM OXALATE 10 MG/1
10 TABLET ORAL DAILY
Qty: 30 TABLET | Refills: 0 | Status: SHIPPED | OUTPATIENT
Start: 2021-01-21 | End: 2021-03-03

## 2021-01-21 RX ORDER — PRAVASTATIN SODIUM 10 MG
10 TABLET ORAL NIGHTLY
Qty: 30 TABLET | Refills: 0 | Status: SHIPPED | OUTPATIENT
Start: 2021-01-21 | End: 2021-05-07

## 2021-01-21 NOTE — PATIENT INSTRUCTIONS
Call cardiology office to make appointment    Consider starting escitalopram 10mg daily. You could cut the tablet in half for the first few doses to help prevent side effects.

## 2021-01-21 NOTE — PROGRESS NOTES
[facility] Internal Medicine Office Note  Chief Complaint:   Patient presents with:  Blood Pressure      HPI:   This is a 60year old male coming in for blood pressure check   HPI  BP has been running in 983U-320M systolic at home and he has been essential hypertension     Morbid obesity with BMI of 40.0-44.9, adult [facility])    Past Surgical History:   Procedure Laterality Date   • Colonoscopy     • Hemorrhoidectomy     • Hernia surgery       Family History   Problem Relation Age of Onset   • Diabetes Negative. Hematological: Negative. Psychiatric/Behavioral: Negative.          EXAM:   /60   Pulse 92   Temp 98 °F (36.7 °C) (Oral)   Resp 18   Ht 5' 6\" (1.676 m)   Wt 249 lb (112.9 kg)   SpO2 99%   BMI 40.19 kg/m²  Estimated body mass index is effects with both rosuvastatin and atorvastatin. Will start pravastatin     Blood pressure readings were normal today and 3 weeks ago in office. He will follow up next week with his home cuff to correlate with office reading.     Other orders  -     Pravast

## 2021-02-18 ENCOUNTER — TELEPHONE (OUTPATIENT)
Dept: PHYSICAL THERAPY | Facility: HOSPITAL | Age: 66
End: 2021-02-18

## 2021-02-19 ENCOUNTER — OFFICE VISIT (OUTPATIENT)
Dept: PHYSICAL THERAPY | Age: 66
End: 2021-02-19
Attending: INTERNAL MEDICINE
Payer: COMMERCIAL

## 2021-02-19 DIAGNOSIS — R42 VERTIGO: ICD-10-CM

## 2021-02-19 PROCEDURE — 97161 PT EVAL LOW COMPLEX 20 MIN: CPT

## 2021-02-19 PROCEDURE — 97110 THERAPEUTIC EXERCISES: CPT

## 2021-02-19 NOTE — PROGRESS NOTES
VESTIBULAR EVALUATION:   Referring Physician: Dr. Hazel Matias  Diagnosis: Vertigo  Comments:  Vestibular therapy       Date of Service: 2/19/2021     PATIENT SUMMARY   Arron Orellana is a 60year old male who presents to therapy today with reports of dizzy Hyperlipidemia, Hypovitaminosis D, and Unspecified hypothyroidism. He also has no past medical history of Anesthesia complication, Difficult intubation, Malignant hyperthermia, PONV (postoperative nausea and vomiting), or Pseudocholinesterase deficiency. ROHIT    Today's Treatment and Response: no c/o nausea. Did not reproduce vertigo today. Pt education was provided on exam findings, treatment diagnosis, treatment plan, expectations, and prognosis.  Pt was also provided recommendations for importance of re co-sign or sign and return this letter via fax as soon as possible to [phone].  If you have any questions, please contact me at Dept: [phone]    Sincerely,  Electronically signed by therapist: Daphney Deluca PT  Physician's certification requi

## 2021-02-23 ENCOUNTER — OFFICE VISIT (OUTPATIENT)
Dept: PHYSICAL THERAPY | Age: 66
End: 2021-02-23
Attending: INTERNAL MEDICINE
Payer: COMMERCIAL

## 2021-02-23 PROCEDURE — 97112 NEUROMUSCULAR REEDUCATION: CPT

## 2021-02-23 NOTE — PROGRESS NOTES
Dx: Vertigo         Comments:  Vestibular therapy           Insurance (Authorized # of Visits):  [id number]           Authorizing Physician: Dr. Julianna Jo MD visit: none scheduled  Fall Risk: standard         Precautions: n/a             Subjective: Reports feel 2/23/2021  TX#: 2/8 Date:                 TX#: 3/ Date:                 TX#: 4/ Date:                 TX#: 5/ Date:    Tx#: 6/   NR  Reassess  Pt ed review pacing vs habitutaion, symptoms management, anxiety association  HEP  -standing VOR 30\"x3   -goal 3x

## 2021-02-26 ENCOUNTER — APPOINTMENT (OUTPATIENT)
Dept: CT IMAGING | Age: 66
End: 2021-02-26
Attending: EMERGENCY MEDICINE
Payer: COMMERCIAL

## 2021-02-26 ENCOUNTER — APPOINTMENT (OUTPATIENT)
Dept: MRI IMAGING | Age: 66
End: 2021-02-26
Attending: EMERGENCY MEDICINE
Payer: COMMERCIAL

## 2021-02-26 ENCOUNTER — TELEPHONE (OUTPATIENT)
Dept: INTERNAL MEDICINE CLINIC | Facility: CLINIC | Age: 66
End: 2021-02-26

## 2021-02-26 ENCOUNTER — HOSPITAL ENCOUNTER (EMERGENCY)
Age: 66
Discharge: HOME | End: 2021-02-26
Attending: EMERGENCY MEDICINE
Payer: COMMERCIAL

## 2021-02-26 VITALS
BODY MASS INDEX: 40.02 KG/M2 | HEIGHT: 66 IN | RESPIRATION RATE: 16 BRPM | OXYGEN SATURATION: 99 % | SYSTOLIC BLOOD PRESSURE: 152 MMHG | HEART RATE: 74 BPM | DIASTOLIC BLOOD PRESSURE: 73 MMHG | WEIGHT: 249 LBS | TEMPERATURE: 98 F

## 2021-02-26 DIAGNOSIS — R42 DIZZINESS: ICD-10-CM

## 2021-02-26 DIAGNOSIS — I10 HYPERTENSION, UNSPECIFIED TYPE: Primary | ICD-10-CM

## 2021-02-26 LAB
ALBUMIN SERPL-MCNC: 3.8 G/DL (ref 3.4–5)
ALBUMIN/GLOB SERPL: 0.9 {RATIO} (ref 1–2)
ALP LIVER SERPL-CCNC: 97 U/L
ALT SERPL-CCNC: 22 U/L
ANION GAP SERPL CALC-SCNC: 4 MMOL/L (ref 0–18)
AST SERPL-CCNC: 11 U/L (ref 15–37)
BASOPHILS # BLD AUTO: 0.04 X10(3) UL (ref 0–0.2)
BASOPHILS NFR BLD AUTO: 0.4 %
BILIRUB SERPL-MCNC: 0.4 MG/DL (ref 0.1–2)
BUN BLD-MCNC: 16 MG/DL (ref 7–18)
BUN/CREAT SERPL: 14.5 (ref 10–20)
CALCIUM BLD-MCNC: 9.6 MG/DL (ref 8.5–10.1)
CHLORIDE SERPL-SCNC: 100 MMOL/L (ref 98–112)
CO2 SERPL-SCNC: 30 MMOL/L (ref 21–32)
CREAT BLD-MCNC: 1.1 MG/DL
DEPRECATED RDW RBC AUTO: 44.5 FL (ref 35.1–46.3)
EOSINOPHIL # BLD AUTO: 0.11 X10(3) UL (ref 0–0.7)
EOSINOPHIL NFR BLD AUTO: 1.2 %
ERYTHROCYTE [DISTWIDTH] IN BLOOD BY AUTOMATED COUNT: 14.6 % (ref 11–15)
GLOBULIN PLAS-MCNC: 4.1 G/DL (ref 2.8–4.4)
GLUCOSE BLD-MCNC: 132 MG/DL (ref 70–99)
HCT VFR BLD AUTO: 43.3 %
HGB BLD-MCNC: 14 G/DL
IMM GRANULOCYTES # BLD AUTO: 0.02 X10(3) UL (ref 0–1)
IMM GRANULOCYTES NFR BLD: 0.2 %
LYMPHOCYTES # BLD AUTO: 2.3 X10(3) UL (ref 1–4)
LYMPHOCYTES NFR BLD AUTO: 24.9 %
M PROTEIN MFR SERPL ELPH: 7.9 G/DL (ref 6.4–8.2)
MCH RBC QN AUTO: 26.9 PG (ref 26–34)
MCHC RBC AUTO-ENTMCNC: 32.3 G/DL (ref 31–37)
MCV RBC AUTO: 83.3 FL
MONOCYTES # BLD AUTO: 0.75 X10(3) UL (ref 0.1–1)
MONOCYTES NFR BLD AUTO: 8.1 %
NEUTROPHILS # BLD AUTO: 6.01 X10 (3) UL (ref 1.5–7.7)
NEUTROPHILS # BLD AUTO: 6.01 X10(3) UL (ref 1.5–7.7)
NEUTROPHILS NFR BLD AUTO: 65.2 %
OSMOLALITY SERPL CALC.SUM OF ELEC: 281 MOSM/KG (ref 275–295)
PLATELET # BLD AUTO: 305 10(3)UL (ref 150–450)
POTASSIUM SERPL-SCNC: 4.2 MMOL/L (ref 3.5–5.1)
RBC # BLD AUTO: 5.2 X10(6)UL
SODIUM SERPL-SCNC: 134 MMOL/L (ref 136–145)
TROPONIN I SERPL-MCNC: <0.045 NG/ML (ref ?–0.04)
WBC # BLD AUTO: 9.2 X10(3) UL (ref 4–11)

## 2021-02-26 PROCEDURE — 93005 ELECTROCARDIOGRAM TRACING: CPT

## 2021-02-26 PROCEDURE — 99285 EMERGENCY DEPT VISIT HI MDM: CPT

## 2021-02-26 PROCEDURE — 70553 MRI BRAIN STEM W/O & W/DYE: CPT | Performed by: EMERGENCY MEDICINE

## 2021-02-26 PROCEDURE — 36415 COLL VENOUS BLD VENIPUNCTURE: CPT

## 2021-02-26 PROCEDURE — 93010 ELECTROCARDIOGRAM REPORT: CPT

## 2021-02-26 PROCEDURE — 84484 ASSAY OF TROPONIN QUANT: CPT | Performed by: EMERGENCY MEDICINE

## 2021-02-26 PROCEDURE — A9575 INJ GADOTERATE MEGLUMI 0.1ML: HCPCS | Performed by: EMERGENCY MEDICINE

## 2021-02-26 PROCEDURE — 85025 COMPLETE CBC W/AUTO DIFF WBC: CPT | Performed by: EMERGENCY MEDICINE

## 2021-02-26 PROCEDURE — 80053 COMPREHEN METABOLIC PANEL: CPT | Performed by: EMERGENCY MEDICINE

## 2021-02-26 PROCEDURE — 70450 CT HEAD/BRAIN W/O DYE: CPT | Performed by: EMERGENCY MEDICINE

## 2021-02-26 RX ORDER — AMLODIPINE BESYLATE 5 MG/1
5 TABLET ORAL DAILY
Qty: 30 TABLET | Refills: 0 | Status: SHIPPED | OUTPATIENT
Start: 2021-02-26 | End: 2021-03-03

## 2021-02-26 RX ORDER — AMLODIPINE BESYLATE 5 MG/1
5 TABLET ORAL ONCE
Status: COMPLETED | OUTPATIENT
Start: 2021-02-26 | End: 2021-02-26

## 2021-02-26 RX ORDER — AMLODIPINE BESYLATE 5 MG/1
5 TABLET ORAL DAILY
Qty: 30 TABLET | Refills: 0 | Status: SHIPPED | OUTPATIENT
Start: 2021-02-26 | End: 2021-02-26 | Stop reason: CLARIF

## 2021-02-26 RX ORDER — MECLIZINE HYDROCHLORIDE 25 MG/1
25 TABLET ORAL 3 TIMES DAILY PRN
COMMUNITY
End: 2021-07-01

## 2021-02-26 NOTE — TELEPHONE ENCOUNTER
Patient stated that he was given referral to see a physical therapist for vertigo. Patient stated that when he last saw the physical therapist she stated that he has an inner ear infection. He wants to know if Dr. Cherry Eli can prescribe an antibiotic.  Needs

## 2021-02-27 LAB
ATRIAL RATE: 82 BPM
P AXIS: 50 DEGREES
P-R INTERVAL: 164 MS
Q-T INTERVAL: 376 MS
QRS DURATION: 98 MS
QTC CALCULATION (BEZET): 439 MS
R AXIS: 17 DEGREES
T AXIS: 45 DEGREES
VENTRICULAR RATE: 82 BPM

## 2021-02-27 NOTE — ED PROVIDER NOTES
Patient Seen in: THE [facility] In [place]      History   Patient presents with:  Hypertension    Stated Complaint: high blood pressure per patient, 529 systolics.  denies chest pain    HPI/Subjective:   HPI    Patient is a 81-year-old mal noted above.     Physical Exam     ED Triage Vitals   BP 02/26/21 1855 (!) 167/75   Pulse 02/26/21 1855 80   Resp 02/26/21 1855 16   Temp 02/26/21 1955 98 °F (36.7 °C)   Temp src --    SpO2 02/26/21 1855 98 %   O2 Device 02/26/21 1855 None (Room air) PLATELET.   Procedure                               Abnormality         Status                     ---------                               -----------         ------                     CBC W/ DIFFERENTIAL[[id number]]                              Final resul with diffusion-weighted imaging for further evaluation.     Dictated by (CST): Jennifer Davenport MD on 2/26/2021 at 7:50 PM     Finalized by (CST): Jennifer Davenport MD on 2/26/2021 at 7:56 PM       Mri Brain (w+wo) (DMV=[id number])    Result Date: 2/26/2021  PROCEDURE:  MRI been happening intermittently all day without position change. Has never had this pattern before. No other symptoms. Is also concerned because he states his blood pressure has been running higher than normal for the last couple weeks.   His neuro exam is Besylate 5 MG Oral Tab  Take 1 tablet (5 mg total) by mouth daily.   Qty: 30 tablet Refills: 0

## 2021-02-27 NOTE — ED INITIAL ASSESSMENT (HPI)
Pt to ED with c/o htn over the past week. Pt states he felt increased fatigue and burning to both ears. Pt states he is currently doing PT for vertigo since December. Pt reports increased dizziness and nausea recently.   Pt also states he had first dose

## 2021-03-01 ENCOUNTER — TELEPHONE (OUTPATIENT)
Dept: PHYSICAL THERAPY | Age: 66
End: 2021-03-01

## 2021-03-01 NOTE — TELEPHONE ENCOUNTER
VM message received from pt to return call. Attempted to contact pt x2 and left messages. Staff message sent to PCP.

## 2021-03-02 ENCOUNTER — TELEPHONE (OUTPATIENT)
Dept: INTERNAL MEDICINE CLINIC | Facility: CLINIC | Age: 66
End: 2021-03-02

## 2021-03-02 NOTE — TELEPHONE ENCOUNTER
Appointment scheduled - ok to keep time ?      Future Appointments   Date Time Provider Lelo Richards   3/12/2021 10:00 AM Elbert Barron MD [facility] [place]

## 2021-03-03 ENCOUNTER — TELEPHONE (OUTPATIENT)
Dept: INTERNAL MEDICINE CLINIC | Facility: CLINIC | Age: 66
End: 2021-03-03

## 2021-03-03 ENCOUNTER — OFFICE VISIT (OUTPATIENT)
Dept: INTERNAL MEDICINE CLINIC | Facility: CLINIC | Age: 66
End: 2021-03-03
Payer: COMMERCIAL

## 2021-03-03 ENCOUNTER — APPOINTMENT (OUTPATIENT)
Dept: PHYSICAL THERAPY | Age: 66
End: 2021-03-03
Attending: INTERNAL MEDICINE
Payer: COMMERCIAL

## 2021-03-03 VITALS
SYSTOLIC BLOOD PRESSURE: 144 MMHG | OXYGEN SATURATION: 99 % | BODY MASS INDEX: 39.86 KG/M2 | RESPIRATION RATE: 12 BRPM | WEIGHT: 248 LBS | HEART RATE: 90 BPM | TEMPERATURE: 98 F | HEIGHT: 66 IN | DIASTOLIC BLOOD PRESSURE: 70 MMHG

## 2021-03-03 DIAGNOSIS — I10 BENIGN ESSENTIAL HYPERTENSION: Primary | ICD-10-CM

## 2021-03-03 DIAGNOSIS — R42 DIZZINESS: ICD-10-CM

## 2021-03-03 DIAGNOSIS — R55 PRE-SYNCOPE: ICD-10-CM

## 2021-03-03 DIAGNOSIS — R00.2 PALPITATIONS: ICD-10-CM

## 2021-03-03 DIAGNOSIS — M25.562 LEFT KNEE PAIN, UNSPECIFIED CHRONICITY: ICD-10-CM

## 2021-03-03 DIAGNOSIS — F41.9 ANXIETY: ICD-10-CM

## 2021-03-03 PROCEDURE — 99214 OFFICE O/P EST MOD 30 MIN: CPT | Performed by: PHYSICIAN ASSISTANT

## 2021-03-03 PROCEDURE — 3077F SYST BP >= 140 MM HG: CPT | Performed by: PHYSICIAN ASSISTANT

## 2021-03-03 PROCEDURE — 3008F BODY MASS INDEX DOCD: CPT | Performed by: PHYSICIAN ASSISTANT

## 2021-03-03 PROCEDURE — 3078F DIAST BP <80 MM HG: CPT | Performed by: PHYSICIAN ASSISTANT

## 2021-03-03 RX ORDER — AMLODIPINE BESYLATE 10 MG/1
10 TABLET ORAL DAILY
Qty: 30 TABLET | Refills: 0 | Status: SHIPPED | OUTPATIENT
Start: 2021-03-03

## 2021-03-03 RX ORDER — AMLODIPINE BESYLATE 5 MG/1
10 TABLET ORAL DAILY
COMMUNITY
Start: 2021-03-03 | End: 2021-07-16 | Stop reason: DRUGHIGH

## 2021-03-03 NOTE — PATIENT INSTRUCTIONS
High blood pressure:  - increase amlodipine to 10 mg daily    Please use [employer] or call Genaro Phil at [phone] to set up the following tests:  - echocardiogram  - holter monitor    Knee pain:  - ice  - tylenol 1,000 mg every 8

## 2021-03-03 NOTE — TELEPHONE ENCOUNTER
Olga Coats 26 is calling to get clarification on the dose for the prescription for amLODIPine Besylate 5 MG Oral Tab. Please advise.

## 2021-03-03 NOTE — TELEPHONE ENCOUNTER
Patient called back and stated that he doesn't want to wait until 03/12 for ER follow up. Patient stated that his blood pressure is still high and he is not feeling better.  Patient rescheduled with Manjit Rodgers for today at 12:30pm.

## 2021-03-03 NOTE — H&P
Arron Bender is a 60year old male. HPI:   Patient presents for f/u from ED visit last week for HTN and lightheadedness. States he feels like he's going to pass out at times. +palpitations. Rx amlodipine 5 mg daily.   Denies chest pain/pressure wi aspirin 81 MG Oral Tab Take 81 mg by mouth daily. • docusate sodium 100 MG Oral Tab Take 1 tablet (100 mg total) by mouth daily.  (Patient not taking: Reported on 2/26/2021 ) 10 tablet 0       Past Medical History:   Diagnosis Date   • Back problem # HTN: above goal.  Increase amlodipine to 10 mg daily. *pt's home BP cuff is NOT accurate (runs about 15 mmHg high systolic and 30 mmHg high diastolic). # Palpitations, pre-syncope: normal ekg at recent ED visit. Holter & echo ordered.   If neg suspec

## 2021-03-03 NOTE — TELEPHONE ENCOUNTER
Spoke with pharmacy, wanting to confirm the increase in dose of the amlodipine to 10mg. Per instructions from OV today:    \"High blood pressure:  - increase amlodipine to 10 mg daily\"    Confirmed with pharmacy dose has been increased to 10mg QD

## 2021-03-10 ENCOUNTER — APPOINTMENT (OUTPATIENT)
Dept: PHYSICAL THERAPY | Age: 66
End: 2021-03-10
Attending: INTERNAL MEDICINE
Payer: COMMERCIAL

## 2021-03-16 ENCOUNTER — OFFICE VISIT (OUTPATIENT)
Dept: PHYSICAL THERAPY | Age: 66
End: 2021-03-16
Attending: INTERNAL MEDICINE
Payer: COMMERCIAL

## 2021-03-16 PROCEDURE — 97112 NEUROMUSCULAR REEDUCATION: CPT

## 2021-03-16 NOTE — PROGRESS NOTES
Dx: Vertigo         Comments:  Vestibular therapy           Insurance (Authorized # of Visits):  6           Authorizing Physician: Dr. Sanjeev Bianchi  Next MD visit: none scheduled  Fall Risk: standard         Precautions: n/a             Subjective: Pt reports h training, canalith repositioning maneuver, eye/head coordination exercises, sensory organization training, optokinetic stimulation  and ROM.      Date: 2/23/2021  TX#: 2/8 Date:   3/16/21              TX#: 3/8 Date:                 TX#: 4/ Date:

## 2021-03-18 DIAGNOSIS — Z23 NEED FOR VACCINATION: ICD-10-CM

## 2021-03-22 ENCOUNTER — TELEPHONE (OUTPATIENT)
Dept: PHYSICAL THERAPY | Facility: HOSPITAL | Age: 66
End: 2021-03-22

## 2021-03-22 ENCOUNTER — APPOINTMENT (OUTPATIENT)
Dept: PHYSICAL THERAPY | Age: 66
End: 2021-03-22
Attending: INTERNAL MEDICINE
Payer: COMMERCIAL

## 2021-04-07 ENCOUNTER — APPOINTMENT (OUTPATIENT)
Dept: PHYSICAL THERAPY | Age: 66
End: 2021-04-07
Attending: INTERNAL MEDICINE
Payer: COMMERCIAL

## 2021-04-07 ENCOUNTER — TELEPHONE (OUTPATIENT)
Dept: PHYSICAL THERAPY | Age: 66
End: 2021-04-07

## 2021-04-14 ENCOUNTER — TELEPHONE (OUTPATIENT)
Dept: PHYSICAL THERAPY | Facility: HOSPITAL | Age: 66
End: 2021-04-14

## 2021-04-14 ENCOUNTER — APPOINTMENT (OUTPATIENT)
Dept: PHYSICAL THERAPY | Age: 66
End: 2021-04-14
Attending: INTERNAL MEDICINE
Payer: COMMERCIAL

## 2021-04-14 ENCOUNTER — TELEPHONE (OUTPATIENT)
Dept: INTERNAL MEDICINE CLINIC | Facility: CLINIC | Age: 66
End: 2021-04-14

## 2021-04-14 DIAGNOSIS — G89.29 CHRONIC PAIN OF LEFT KNEE: Primary | ICD-10-CM

## 2021-04-14 DIAGNOSIS — M25.562 CHRONIC PAIN OF LEFT KNEE: Primary | ICD-10-CM

## 2021-04-14 NOTE — TELEPHONE ENCOUNTER
OV notes from 3/3/2021  Knee pain:  - ice  - tylenol 1,000 mg every 8 hours as needed  - home exercises  - if no improvement or worsening recommend follow up with orthopedics    Okay to order x-ray or advise patient to f/u with ortho?

## 2021-04-19 ENCOUNTER — TELEPHONE (OUTPATIENT)
Dept: PHYSICAL THERAPY | Age: 66
End: 2021-04-19

## 2021-04-20 ENCOUNTER — TELEPHONE (OUTPATIENT)
Dept: PHYSICAL THERAPY | Age: 66
End: 2021-04-20

## 2021-04-20 NOTE — TELEPHONE ENCOUNTER
Returned pt's call. Pt states he was feeling good with no c/o dizziness for 2 weeks. He was doing his HEP and canceled his PT visits because he had no symptoms. Then 2 nights ago rolled over in bed and felt the dizziness again, briefly.   Still feeling \"

## 2021-04-21 ENCOUNTER — APPOINTMENT (OUTPATIENT)
Dept: PHYSICAL THERAPY | Age: 66
End: 2021-04-21
Attending: INTERNAL MEDICINE
Payer: COMMERCIAL

## 2021-04-22 ENCOUNTER — OFFICE VISIT (OUTPATIENT)
Dept: PHYSICAL THERAPY | Age: 66
End: 2021-04-22
Attending: INTERNAL MEDICINE
Payer: COMMERCIAL

## 2021-04-22 PROCEDURE — 97112 NEUROMUSCULAR REEDUCATION: CPT

## 2021-04-22 NOTE — PROGRESS NOTES
Dx: Vertigo         Comments:  Vestibular therapy           Insurance (Authorized # of Visits):  6           Authorizing Physician: Dr. Eddie Villalpando  Next MD visit: none scheduled  Fall Risk: standard         Precautions: n/a             Subjective: Felt good fo balance. Tolerate phone/tv use 30 mins without aggravation of symptoms. Ambulate 100' with head turns without loss of balance and maintaining a straight line. (I) stand eyes closed with narrow base of support and head movement for shower safety. Isa Kurtz Pt

## 2021-04-29 ENCOUNTER — TELEPHONE (OUTPATIENT)
Dept: INTERNAL MEDICINE CLINIC | Facility: CLINIC | Age: 66
End: 2021-04-29

## 2021-04-29 DIAGNOSIS — R73.01 ELEVATED FASTING GLUCOSE: Primary | ICD-10-CM

## 2021-04-29 NOTE — TELEPHONE ENCOUNTER
Patient called requesting to speak with the nurse regarding blood test. Stated that he believes that he needs additional tests ordered

## 2021-05-04 NOTE — TELEPHONE ENCOUNTER
Call patient -a1c ordered  Not due yet for thyroid check - normal on 12/31/20  He had CBC done 2/26/2021 so not yet due.

## 2021-05-04 NOTE — TELEPHONE ENCOUNTER
Patient having x-ray done and is requesting for labs to be placed so he may have those done as well. Lipid, AST, ALT previously ordered on 1/5/2021 by LL. Patient requesting orders for CBC, CMP, A1C, TSH, and any other labs that are recommended.  Bhupinder Mckeon

## 2021-05-04 NOTE — TELEPHONE ENCOUNTER
I called pt to advised of below. He reports he is still having dizziness and wonders if any other labs should be checked on him. He reports seeing Alex[facility] Dcr in march for this. Then saw ENT. I advised labs were checked in ER 2.26 d/t his symptoms.  He is stil

## 2021-05-05 NOTE — TELEPHONE ENCOUNTER
Pt last seen 3/3 and was asked to return in 2 weeks for f/u of HTN. Also has orders for echo and holter monitor which were not completed. Rec scheduling these and then f/u visit and can discuss need for additional labs at that time.   A1c can be done in o

## 2021-05-05 NOTE — TELEPHONE ENCOUNTER
Patient having x-ray and current labs that are ordered done tomorrow. Will call central scheduling have have echo and holter monitor completed. Once those tests are completed, patient stated that he will schedule a follow-up appt.  Made patient aware that o

## 2021-05-06 ENCOUNTER — HOSPITAL ENCOUNTER (OUTPATIENT)
Dept: GENERAL RADIOLOGY | Age: 66
Discharge: HOME | End: 2021-05-06
Attending: PHYSICIAN ASSISTANT
Payer: COMMERCIAL

## 2021-05-06 ENCOUNTER — LAB ENCOUNTER (OUTPATIENT)
Dept: LAB | Age: 66
End: 2021-05-06
Attending: INTERNAL MEDICINE
Payer: COMMERCIAL

## 2021-05-06 DIAGNOSIS — E78.5 HYPERLIPIDEMIA, UNSPECIFIED HYPERLIPIDEMIA TYPE: ICD-10-CM

## 2021-05-06 DIAGNOSIS — M25.562 CHRONIC PAIN OF LEFT KNEE: ICD-10-CM

## 2021-05-06 DIAGNOSIS — G89.29 CHRONIC PAIN OF LEFT KNEE: ICD-10-CM

## 2021-05-06 DIAGNOSIS — R73.01 ELEVATED FASTING GLUCOSE: ICD-10-CM

## 2021-05-06 PROCEDURE — 80061 LIPID PANEL: CPT | Performed by: PHYSICIAN ASSISTANT

## 2021-05-06 PROCEDURE — 73564 X-RAY EXAM KNEE 4 OR MORE: CPT | Performed by: PHYSICIAN ASSISTANT

## 2021-05-06 PROCEDURE — 73562 X-RAY EXAM OF KNEE 3: CPT | Performed by: PHYSICIAN ASSISTANT

## 2021-05-06 PROCEDURE — 3044F HG A1C LEVEL LT 7.0%: CPT | Performed by: INTERNAL MEDICINE

## 2021-05-06 PROCEDURE — 84450 TRANSFERASE (AST) (SGOT): CPT | Performed by: PHYSICIAN ASSISTANT

## 2021-05-06 PROCEDURE — 83036 HEMOGLOBIN GLYCOSYLATED A1C: CPT | Performed by: INTERNAL MEDICINE

## 2021-05-06 PROCEDURE — 84460 ALANINE AMINO (ALT) (SGPT): CPT | Performed by: PHYSICIAN ASSISTANT

## 2021-05-07 RX ORDER — PRAVASTATIN SODIUM 10 MG
20 TABLET ORAL NIGHTLY
COMMUNITY
Start: 2021-05-07 | End: 2021-07-16

## 2021-06-28 ENCOUNTER — TELEPHONE (OUTPATIENT)
Dept: INTERNAL MEDICINE CLINIC | Facility: CLINIC | Age: 66
End: 2021-06-28

## 2021-06-28 NOTE — TELEPHONE ENCOUNTER
Pt c/o lightheaded and dizziness with sudden position changed or head motion. Also c/o bilateral ear popping and discomfort. No c/o fever, chill, visual changes, nausea or vomiting. No COVID symptoms or recent travel.   Pt would like to be squeezed in w

## 2021-06-28 NOTE — TELEPHONE ENCOUNTER
Patient called complaining of possible ear infection. Feels vertigo like symptoms, lightheadedness and dizziness intermittently.  Please advise

## 2021-06-29 NOTE — TELEPHONE ENCOUNTER
This is a chronic issue (last OV in 3/2021 was for similar symptoms). He also follows with ENT. I have an opening at 2:00 today, otherwise rec f/u with ENT or IC if symptoms changing/worsening.

## 2021-07-01 RX ORDER — MECLIZINE HYDROCHLORIDE 25 MG/1
25 TABLET ORAL 3 TIMES DAILY PRN
Qty: 20 TABLET | Refills: 0 | Status: SHIPPED | OUTPATIENT
Start: 2021-07-01

## 2021-07-01 NOTE — TELEPHONE ENCOUNTER
Recommend meclizine if symptoms are severe and call ENT/Dr. Lukas Camacho.  I don't have any available appointments today

## 2021-07-01 NOTE — TELEPHONE ENCOUNTER
Patient provided with MD instructions listed below and verbalized understanding. Pt is wondering if a refill on meclizine can be sent to [facility]. Refill pending.

## 2021-07-01 NOTE — TELEPHONE ENCOUNTER
Message from LL below relayed to pt. Pt reiterates that he is still having same s/s an would like to be seen today. Dr Julianna An, would you be able to accommodate an appointment?

## 2021-07-02 ENCOUNTER — HOSPITAL ENCOUNTER (OUTPATIENT)
Dept: CV DIAGNOSTICS | Age: 66
Discharge: HOME | End: 2021-07-02
Attending: PHYSICIAN ASSISTANT
Payer: MEDICARE

## 2021-07-02 DIAGNOSIS — R55 PRE-SYNCOPE: ICD-10-CM

## 2021-07-02 DIAGNOSIS — R00.2 PALPITATIONS: ICD-10-CM

## 2021-07-02 DIAGNOSIS — I10 BENIGN ESSENTIAL HYPERTENSION: ICD-10-CM

## 2021-07-02 PROCEDURE — 93306 TTE W/DOPPLER COMPLETE: CPT | Performed by: PHYSICIAN ASSISTANT

## 2021-07-02 PROCEDURE — 93225 XTRNL ECG REC<48 HRS REC: CPT | Performed by: PHYSICIAN ASSISTANT

## 2021-07-02 PROCEDURE — 93227 XTRNL ECG REC<48 HR R&I: CPT | Performed by: PHYSICIAN ASSISTANT

## 2021-07-06 ENCOUNTER — MOBILE ENCOUNTER (OUTPATIENT)
Dept: INTERNAL MEDICINE CLINIC | Facility: CLINIC | Age: 66
End: 2021-07-06

## 2021-07-10 ENCOUNTER — MOBILE ENCOUNTER (OUTPATIENT)
Dept: INTERNAL MEDICINE CLINIC | Facility: CLINIC | Age: 66
End: 2021-07-10

## 2021-07-10 NOTE — PROGRESS NOTES
Received on call page from patient. Saw ENT yesterday and was rx medrol dosepack for ear issue. Also told to take OTC Claritin (not -D version). Has not yet started PO steroids d/t concerns that his BP has been 150-953 systolic the past couple days.  Is martha

## 2021-07-13 ENCOUNTER — PATIENT MESSAGE (OUTPATIENT)
Dept: INTERNAL MEDICINE CLINIC | Facility: CLINIC | Age: 66
End: 2021-07-13

## 2021-07-13 NOTE — TELEPHONE ENCOUNTER
From: Siomara Gonzalez  To:  CRISTOBAL Sandra  Sent: 7/13/2021 10:37 AM CDT  Subject: Prescription Question    Hi Pooja Suarez ! as per our conversation couple days ago just want to let you know my blood pressure this morning was 145/73 yesterday was 155/65 filiberto

## 2021-07-16 ENCOUNTER — OFFICE VISIT (OUTPATIENT)
Dept: INTERNAL MEDICINE CLINIC | Facility: CLINIC | Age: 66
End: 2021-07-16
Payer: MEDICARE

## 2021-07-16 ENCOUNTER — TELEPHONE (OUTPATIENT)
Dept: INTERNAL MEDICINE CLINIC | Facility: CLINIC | Age: 66
End: 2021-07-16

## 2021-07-16 VITALS
BODY MASS INDEX: 39.66 KG/M2 | HEART RATE: 70 BPM | TEMPERATURE: 98 F | WEIGHT: 246.81 LBS | RESPIRATION RATE: 18 BRPM | HEIGHT: 66 IN | SYSTOLIC BLOOD PRESSURE: 130 MMHG | OXYGEN SATURATION: 100 % | DIASTOLIC BLOOD PRESSURE: 78 MMHG

## 2021-07-16 DIAGNOSIS — M17.0 PRIMARY OSTEOARTHRITIS OF BOTH KNEES: ICD-10-CM

## 2021-07-16 DIAGNOSIS — E03.9 HYPOTHYROIDISM, UNSPECIFIED TYPE: ICD-10-CM

## 2021-07-16 DIAGNOSIS — E78.2 MIXED HYPERLIPIDEMIA: ICD-10-CM

## 2021-07-16 DIAGNOSIS — Z12.11 SCREENING FOR COLON CANCER: ICD-10-CM

## 2021-07-16 DIAGNOSIS — R73.01 ELEVATED FASTING GLUCOSE: Primary | ICD-10-CM

## 2021-07-16 DIAGNOSIS — N52.9 ERECTILE DYSFUNCTION, UNSPECIFIED ERECTILE DYSFUNCTION TYPE: ICD-10-CM

## 2021-07-16 PROCEDURE — 3008F BODY MASS INDEX DOCD: CPT | Performed by: INTERNAL MEDICINE

## 2021-07-16 PROCEDURE — 3075F SYST BP GE 130 - 139MM HG: CPT | Performed by: INTERNAL MEDICINE

## 2021-07-16 PROCEDURE — 99214 OFFICE O/P EST MOD 30 MIN: CPT | Performed by: INTERNAL MEDICINE

## 2021-07-16 PROCEDURE — 3078F DIAST BP <80 MM HG: CPT | Performed by: INTERNAL MEDICINE

## 2021-07-16 RX ORDER — SILDENAFIL 50 MG/1
50 TABLET, FILM COATED ORAL
Qty: 10 TABLET | Refills: 5 | Status: SHIPPED | OUTPATIENT
Start: 2021-07-16

## 2021-07-16 NOTE — PATIENT INSTRUCTIONS
August 7 or later for blood work     Follow up with gastroenterology for colonoscopy    Follow up with Dr. Kathy Cuba orthopedics

## 2021-08-27 ENCOUNTER — LAB ENCOUNTER (OUTPATIENT)
Dept: LAB | Age: 66
End: 2021-08-27
Attending: INTERNAL MEDICINE
Payer: MEDICARE

## 2021-08-27 ENCOUNTER — OFFICE VISIT (OUTPATIENT)
Dept: INTERNAL MEDICINE CLINIC | Facility: CLINIC | Age: 66
End: 2021-08-27
Payer: MEDICARE

## 2021-08-27 ENCOUNTER — TELEPHONE (OUTPATIENT)
Dept: ORTHOPEDICS CLINIC | Facility: CLINIC | Age: 66
End: 2021-08-27

## 2021-08-27 VITALS
SYSTOLIC BLOOD PRESSURE: 115 MMHG | WEIGHT: 247.19 LBS | BODY MASS INDEX: 39.73 KG/M2 | TEMPERATURE: 99 F | DIASTOLIC BLOOD PRESSURE: 86 MMHG | HEART RATE: 69 BPM | HEIGHT: 66 IN | RESPIRATION RATE: 16 BRPM | OXYGEN SATURATION: 98 %

## 2021-08-27 DIAGNOSIS — I10 BENIGN ESSENTIAL HYPERTENSION: ICD-10-CM

## 2021-08-27 DIAGNOSIS — E03.9 HYPOTHYROIDISM, UNSPECIFIED TYPE: ICD-10-CM

## 2021-08-27 DIAGNOSIS — L60.9 NAIL ABNORMALITY: ICD-10-CM

## 2021-08-27 DIAGNOSIS — E78.2 MIXED HYPERLIPIDEMIA: ICD-10-CM

## 2021-08-27 DIAGNOSIS — R73.01 ELEVATED FASTING GLUCOSE: ICD-10-CM

## 2021-08-27 DIAGNOSIS — R42 VERTIGO: ICD-10-CM

## 2021-08-27 DIAGNOSIS — M17.12 PRIMARY OSTEOARTHRITIS OF LEFT KNEE: ICD-10-CM

## 2021-08-27 DIAGNOSIS — Z00.00 WELLNESS EXAMINATION: Primary | ICD-10-CM

## 2021-08-27 DIAGNOSIS — R73.03 PRE-DIABETES: ICD-10-CM

## 2021-08-27 LAB
ALBUMIN SERPL-MCNC: 3.8 G/DL (ref 3.4–5)
ALBUMIN/GLOB SERPL: 1 {RATIO} (ref 1–2)
ALP LIVER SERPL-CCNC: 80 U/L
ALT SERPL-CCNC: 20 U/L
ANION GAP SERPL CALC-SCNC: 5 MMOL/L (ref 0–18)
AST SERPL-CCNC: 13 U/L (ref 15–37)
BILIRUB SERPL-MCNC: 0.6 MG/DL (ref 0.1–2)
BUN BLD-MCNC: 17 MG/DL (ref 7–18)
CALCIUM BLD-MCNC: 8.9 MG/DL (ref 8.5–10.1)
CHLORIDE SERPL-SCNC: 105 MMOL/L (ref 98–112)
CHOLEST SMN-MCNC: 193 MG/DL (ref ?–200)
CO2 SERPL-SCNC: 25 MMOL/L (ref 21–32)
CREAT BLD-MCNC: 0.98 MG/DL
EST. AVERAGE GLUCOSE BLD GHB EST-MCNC: 131 MG/DL (ref 68–126)
GLOBULIN PLAS-MCNC: 4 G/DL (ref 2.8–4.4)
GLUCOSE BLD-MCNC: 86 MG/DL (ref 70–99)
HBA1C MFR BLD HPLC: 6.2 % (ref ?–5.7)
HDLC SERPL-MCNC: 35 MG/DL (ref 40–59)
LDLC SERPL CALC-MCNC: 127 MG/DL (ref ?–100)
M PROTEIN MFR SERPL ELPH: 7.8 G/DL (ref 6.4–8.2)
NONHDLC SERPL-MCNC: 158 MG/DL (ref ?–130)
OSMOLALITY SERPL CALC.SUM OF ELEC: 281 MOSM/KG (ref 275–295)
POTASSIUM SERPL-SCNC: 4 MMOL/L (ref 3.5–5.1)
SODIUM SERPL-SCNC: 135 MMOL/L (ref 136–145)
TRIGL SERPL-MCNC: 170 MG/DL (ref 30–149)
TSI SER-ACNC: 2.19 MIU/ML (ref 0.36–3.74)
VLDLC SERPL CALC-MCNC: 31 MG/DL (ref 0–30)

## 2021-08-27 PROCEDURE — 80053 COMPREHEN METABOLIC PANEL: CPT

## 2021-08-27 PROCEDURE — 83036 HEMOGLOBIN GLYCOSYLATED A1C: CPT

## 2021-08-27 PROCEDURE — 36415 COLL VENOUS BLD VENIPUNCTURE: CPT

## 2021-08-27 PROCEDURE — 84443 ASSAY THYROID STIM HORMONE: CPT

## 2021-08-27 PROCEDURE — G0402 INITIAL PREVENTIVE EXAM: HCPCS | Performed by: INTERNAL MEDICINE

## 2021-08-27 PROCEDURE — 80061 LIPID PANEL: CPT

## 2021-08-27 NOTE — PROGRESS NOTES
REASON FOR VISIT:    Amber Chandler is a 73year old male who presents for a Medicare Initial Preventative Physical exam.    Pre-diabetes - last a1c was 6.3    HTN - he notes BP has been running high at home  On amlodipine 10mg   Today 125 systolic at home Sildenafil Citrate (VIAGRA) 50 MG Oral Tab Take 1 tablet (50 mg total) by mouth daily as needed for Erectile Dysfunction. 10 tablet 5   • Meclizine HCl 25 MG Oral Tab Take 1 tablet (25 mg total) by mouth 3 (three) times daily as needed for Dizziness.  20 ta - 3.740 mIU/mL 0.822 1.210 3.14 3.220 3.380 1.120 1.640   Free T4 0.9 - 1.8 ng/dL - - - - 1.2 1.3 -     [facility] WELLNESS LAB REVIEW FLOWSHEET PSA Latest Ref Rng & Units 8/8/2017 9/19/2013   PSA 0.010 - 4.000 ng/mL 0.873 0.580       General Health      In the p \"Ball\": Incorrect  Recall \"Flag\":  Incorrect  Recall \"Tree\": Correct         PREVENTATIVE SERVICES   INDICATIONS AND SCHEDULE Internal Lab or Procedure   Diabetes Screening     HbgA1C   Annually HEMOGLOBIN A1C (%)   Date Value   03/20/2018 6.3 (A) Administered    Covid-19 Vaccine Pfizer 30 mcg/0.3 ml                          02/25/2021 03/18/2021      Meningococcal-Menactra                          07/10/2018        SOCIAL HISTORY:   Social History    Tobacco Use      Smoking status: Never Smoker for a Medicare Assessment.      PLAN SUMMARY:   Diagnoses and all orders for this visit:    Wellness examination  -received covid vaccine  -staff reached out to Dr. Adeel Saldana office   -will check on status of pna vaccines at next appointment     Pre-diabetes

## 2021-08-27 NOTE — TELEPHONE ENCOUNTER
Future Appointments   Date Time Provider Lelo Susie   10/4/2021  9:20 AM Sunita Ramos MD [facility]     · Pt is coming in for LEFT knee arthritis  · Imaging done 5/2021  · Please advise if imaging is needed

## 2021-08-27 NOTE — TELEPHONE ENCOUNTER
Impression   CONCLUSION:  Moderate to severe tricompartmental osteoarthritis of the left knee most pronounced in the medial compartment.  Possible ossific intra-articular body projecting superior to the tibial spines.          xrays from 05/2021 with OA no

## 2021-08-31 ENCOUNTER — TELEPHONE (OUTPATIENT)
Dept: INTERNAL MEDICINE CLINIC | Facility: CLINIC | Age: 66
End: 2021-08-31

## 2021-08-31 NOTE — TELEPHONE ENCOUNTER
Lab results reviewed with pt. Received message only addressing the A1C. Any other recommendation regarding lab results? Since yesterday pt c/o burning sensation in chest 45 min after eating.   No c/o chest pain,  Radiating pain, sob, lightheadedness, na

## 2021-09-01 NOTE — TELEPHONE ENCOUNTER
Let him know there was a [address] sent to him on 8/30/21 with a full review of results. Copied below.       Hi Mr. James Alvarado,      Your A1c results are about stable at 6.2, in pre-diabetes range.      Triglycerides are high but other cholesterol numbers l

## 2021-09-01 NOTE — TELEPHONE ENCOUNTER
Since yesterday pt c/o burning sensation in chest 45 min after eating. No c/o chest pain,  Radiating pain, sob, lightheadedness, nausea or vomiting. Currently on steroid, amlodipine and ASA.       Would like to ask Dr Courtney Bowens if ok to take famotidine w

## 2021-09-01 NOTE — TELEPHONE ENCOUNTER
Pt called wanting to speak with a nurse regarding his call yesterday. Pt is frustrated with calling back and forth and would like to discuss  message below.

## 2021-09-03 ENCOUNTER — PATIENT MESSAGE (OUTPATIENT)
Dept: INTERNAL MEDICINE CLINIC | Facility: CLINIC | Age: 66
End: 2021-09-03

## 2021-09-03 DIAGNOSIS — R50.9 FEVER, UNSPECIFIED FEVER CAUSE: Primary | ICD-10-CM

## 2021-09-03 NOTE — TELEPHONE ENCOUNTER
From: Amber Chandler  To: Caleb Stoddard MD  Sent: 9/3/2021 1:24 PM CDT  Subject: Visit Follow-up Question    I have taken famotide since your phone little relief from heart burns but still after eating has stomach discomfort and feels blotted today after t

## 2021-09-10 ENCOUNTER — HOSPITAL ENCOUNTER (OUTPATIENT)
Dept: GENERAL RADIOLOGY | Age: 66
Discharge: HOME | End: 2021-09-10
Attending: INTERNAL MEDICINE
Payer: MEDICARE

## 2021-09-10 DIAGNOSIS — R50.9 FEVER, UNSPECIFIED FEVER CAUSE: ICD-10-CM

## 2021-09-10 PROCEDURE — 71046 X-RAY EXAM CHEST 2 VIEWS: CPT | Performed by: INTERNAL MEDICINE

## 2021-09-10 NOTE — TELEPHONE ENCOUNTER
Pt wanted to follow up on the [employer] message he sent this morning. Pt wants to know what his next steps are since he is still experiencing symptoms and has a low grade fever.      Confirmed [phone]

## 2021-09-10 NOTE — TELEPHONE ENCOUNTER
Recommend COVID test due to fever (placed order) and recommend chest x-ray.  Consider going to urgent care to get both done there

## 2021-09-11 ENCOUNTER — LAB ENCOUNTER (OUTPATIENT)
Dept: LAB | Age: 66
End: 2021-09-11
Attending: INTERNAL MEDICINE
Payer: MEDICARE

## 2021-09-11 DIAGNOSIS — R50.9 FEVER, UNSPECIFIED FEVER CAUSE: ICD-10-CM

## 2021-09-13 ENCOUNTER — TELEPHONE (OUTPATIENT)
Dept: INTERNAL MEDICINE CLINIC | Facility: CLINIC | Age: 66
End: 2021-09-13

## 2021-09-13 NOTE — TELEPHONE ENCOUNTER
Pt c/o chest congestion and cough. Chest has not loosened up yet. Temp today 99.7. Temp yesterday 97.0-98.0. Decreased appetite. No c/o wheezing or sob. Took Nyquil last night and woke today feeling no better. COVID testing in process.   CXR complete

## 2021-09-13 NOTE — TELEPHONE ENCOUNTER
Patient now with severe congestion. Covid result not in yet. Has OTC Dayquil, Nyquil at home. Asking if appropriate to use ?

## 2021-09-15 ENCOUNTER — TELEPHONE (OUTPATIENT)
Dept: INTERNAL MEDICINE CLINIC | Facility: CLINIC | Age: 66
End: 2021-09-15

## 2021-09-15 LAB — SARS-COV-2 RNA RESP QL NAA+PROBE: NOT DETECTED

## 2021-09-15 NOTE — TELEPHONE ENCOUNTER
Patient has some questions for the nurse regarding some health issues he is experiencing. Patient wouldn't give me any other information. Please advise.

## 2021-09-15 NOTE — TELEPHONE ENCOUNTER
Patient states that his sx has improved. Some congestion still present. Patient now reports smelling 'onions' or 'gas' which is not present. Denies the presence of any new sx or worsening sx.      Patient just wanted to provide update to Dr. Naye Guillermo and

## 2021-09-16 NOTE — TELEPHONE ENCOUNTER
Could try over the counter nasal steroid like flonase to see if this helps. If any other symptoms develop such as fever or significant sinus pressure, let me know.

## 2021-09-16 NOTE — TELEPHONE ENCOUNTER
Spoke with patient advised could try over the counter nasal steroid like flonase to see if this helps, if any other symptoms develop such as fever or sinus pressure patient to contact our office. Patient verbalized understanding and agreeable to POC.

## 2021-09-20 RX ORDER — LEVOTHYROXINE SODIUM 112 UG/1
TABLET ORAL
Qty: 90 TABLET | Refills: 0 | Status: SHIPPED | OUTPATIENT
Start: 2021-09-20 | End: 2021-12-20

## 2021-09-20 NOTE — TELEPHONE ENCOUNTER
Protocol passed     Requesting: levothyroxine 112mcg       LOV: 8/27/21   Hypothyroidism, unspecified type -cont levothyroxine; check labs   RTC: 6 months   Filled: 9/8/20 #90 3 refills   Recent Labs: 8/27/21     Upcoming OV: none scheduled

## 2021-09-30 NOTE — TELEPHONE ENCOUNTER
Pt called and stated he still has a light, dry cough.  Worse after eating a meal     Denies chills, fever, runny nose, congestion, no flem after coughing     Pt stated he finished Flonase, now taking Allegra as of a week ago     Requesting a recommended cou

## 2021-10-01 ENCOUNTER — TELEPHONE (OUTPATIENT)
Dept: INTERNAL MEDICINE CLINIC | Facility: CLINIC | Age: 66
End: 2021-10-01

## 2021-10-01 RX ORDER — CODEINE PHOSPHATE AND GUAIFENESIN 10; 100 MG/5ML; MG/5ML
5 SOLUTION ORAL NIGHTLY PRN
Qty: 180 ML | Refills: 0 | Status: SHIPPED | OUTPATIENT
Start: 2021-10-01

## 2021-10-01 RX ORDER — CODEINE PHOSPHATE AND GUAIFENESIN 10; 100 MG/5ML; MG/5ML
5 SOLUTION ORAL NIGHTLY PRN
Qty: 180 ML | Refills: 0 | Status: SHIPPED
Start: 2021-10-01 | End: 2021-10-01

## 2021-10-01 NOTE — TELEPHONE ENCOUNTER
Patient agreeable to trying codeine at night. Patient has robitussin and delsym at home and will use those for daytime.

## 2021-10-01 NOTE — TELEPHONE ENCOUNTER
I can prescribe a codeine cough syrup to take at bedtime. Side effects include drowsiness - no driving or alcohol when taking it. For daytime, can use over the counter delsym or robitussin. Can also add otc mucinex.

## 2021-10-01 NOTE — TELEPHONE ENCOUNTER
[address]. called and requested to know why patient was given a 36 day supply of cough syrup    Please advise       guaiFENesin-codeine (CHERATUSSIN AC) 100-10 MG/5ML Oral Solution    [id number] [place], [place] - [address] [phone]

## 2021-10-04 ENCOUNTER — LAB ENCOUNTER (OUTPATIENT)
Dept: LAB | Age: 66
End: 2021-10-04
Attending: INTERNAL MEDICINE
Payer: MEDICARE

## 2021-10-04 ENCOUNTER — OFFICE VISIT (OUTPATIENT)
Dept: ORTHOPEDICS CLINIC | Facility: CLINIC | Age: 66
End: 2021-10-04
Payer: MEDICARE

## 2021-10-04 VITALS — HEIGHT: 66.5 IN | WEIGHT: 243.38 LBS | OXYGEN SATURATION: 99 % | HEART RATE: 70 BPM | BODY MASS INDEX: 38.65 KG/M2

## 2021-10-04 DIAGNOSIS — M17.12 PRIMARY OSTEOARTHRITIS OF LEFT KNEE: Primary | ICD-10-CM

## 2021-10-04 DIAGNOSIS — Z01.818 PRE-OP TESTING: ICD-10-CM

## 2021-10-04 PROCEDURE — 99203 OFFICE O/P NEW LOW 30 MIN: CPT | Performed by: ORTHOPAEDIC SURGERY

## 2021-10-04 NOTE — PATIENT INSTRUCTIONS
What Is Arthritis? Arthritis is a disease that affects the joints. Joints are the parts where bones meet and move. It can affect any joint in your body. There are many types of arthritis.  They include:   · Osteoarthritis  · Rheumatoid arthritis  · Gout  · The joint's range of motion can become limited. Symptoms  Arthritis can affect any joint. Weight-bearing joints, such as the hips and knees, are often affected. Common symptoms are joint pain and stiffness.  Pain and stiffness may get worse with inactivi pounds. Losing one single pound takes multiple pounds of pressure off the joints. Losing 10 pounds can take 50 pounds of pressure off the joints. The tips below may help:  · Start a weight-loss program with the help of your healthcare provider.   · Ask your exercise part of your life. Talk with your healthcare provider about what is safe for you. Make sure you:  · Choose exercises that improve joint motion and make your muscles stronger. Learn how to do exercises correctly and safely.  Consider talking with a weights. · Isometric exercises are done by tightening the muscles without moving the joint. This may be a good way to strengthen the muscles around a stiff joint. · Avoid deep flexion or deep squatting (do not bend the knee more than 90 degrees).   · Focu Stretching and flexibility activities such as yoga and anastacio chi may improve pain and joint motion.  You might try:  · Yoga, including chair yoga, helps to keep your joints strong and flexible  · Anastacio Chi, an ancient type of exercise with slow, gentle movement falls  · Splints for your wrists or other joints  · A brace to support a weak knee joint  · Orthotics for toe and foot involvement    Medical and surgical treatments  Discuss medical treatments with your healthcare provider to help reduce your pain and imp very small incisions. The cartilage is smoothed. Any pieces of cartilage that have broken off are removed. · Total joint replacement. The entire joint is taken out and replaced with a manmade joint using metal, ceramic, or plastic.  This is most often done

## 2021-10-04 NOTE — H&P
[facility] New Patient Note    CC: Patient presents with:  Knee Pain: left knee pain      HPI: This 73year old male presents today with complaints of left knee pain. Symptoms have been going on for a little more than a year.   He feels pain along th 81 MG Oral Tab Take 81 mg by mouth daily. • docusate sodium 100 MG Oral Tab Take 1 tablet (100 mg total) by mouth daily.  10 tablet 0       Atorvastatin            NAUSEA ONLY, DIZZINESS  Statins                 MYALGIA  Seasonal                ITCHIN include anti-inflammatory medications, injections, activity modification, weight loss, low impact exercise and possible therapy.   Surgery would be with knee replacement and is an elective operation reserved for when nonsurgical treatments no longer allevia

## 2021-10-07 PROBLEM — Z12.11 SPECIAL SCREENING FOR MALIGNANT NEOPLASM OF COLON: Status: ACTIVE | Noted: 2021-10-07

## 2021-12-20 RX ORDER — LEVOTHYROXINE SODIUM 112 UG/1
TABLET ORAL
Qty: 90 TABLET | Refills: 0 | Status: SHIPPED | OUTPATIENT
Start: 2021-12-20

## 2022-03-22 NOTE — TELEPHONE ENCOUNTER
LOV: 8/27/2021 with Dr. Earnest Alberts  RTC: 6 months  Last Relevant Labs: 8/27/2021  Filled: 12/20/2021   #90 with 0 refills    No future appointments.

## 2022-03-22 NOTE — TELEPHONE ENCOUNTER
LOV: 8/27/2021 with Dr. Lu Montez  RTC: 6 months  Last Relevant Labs: 8/27/2021  Filled: 12/20/2021    #90 with 0 refills    No future appointments.

## 2022-03-23 RX ORDER — LEVOTHYROXINE SODIUM 112 UG/1
TABLET ORAL
Qty: 90 TABLET | Refills: 0 | OUTPATIENT
Start: 2022-03-23

## 2022-03-23 RX ORDER — LEVOTHYROXINE SODIUM 112 UG/1
112 TABLET ORAL
Qty: 90 TABLET | Refills: 0 | Status: SHIPPED | OUTPATIENT
Start: 2022-03-23 | End: 2022-03-31

## 2022-03-23 NOTE — TELEPHONE ENCOUNTER
Pt called stating he has been our for a few days now. Pt requesting refill be sent today, pt would like to know if a different doctor can send the rx due to Jinx Nail not being in the office.

## 2022-03-29 ENCOUNTER — LAB ENCOUNTER (OUTPATIENT)
Dept: LAB | Age: 67
End: 2022-03-29
Attending: PHYSICIAN ASSISTANT
Payer: MEDICARE

## 2022-03-29 ENCOUNTER — OFFICE VISIT (OUTPATIENT)
Dept: INTERNAL MEDICINE CLINIC | Facility: CLINIC | Age: 67
End: 2022-03-29
Payer: MEDICARE

## 2022-03-29 VITALS
HEART RATE: 71 BPM | RESPIRATION RATE: 16 BRPM | TEMPERATURE: 97 F | BODY MASS INDEX: 40 KG/M2 | SYSTOLIC BLOOD PRESSURE: 124 MMHG | OXYGEN SATURATION: 98 % | DIASTOLIC BLOOD PRESSURE: 76 MMHG | WEIGHT: 249.19 LBS

## 2022-03-29 DIAGNOSIS — E03.9 HYPOTHYROIDISM, UNSPECIFIED TYPE: ICD-10-CM

## 2022-03-29 DIAGNOSIS — I10 BENIGN ESSENTIAL HYPERTENSION: Primary | ICD-10-CM

## 2022-03-29 DIAGNOSIS — M54.2 ANTERIOR NECK PAIN: ICD-10-CM

## 2022-03-29 DIAGNOSIS — R73.03 PRE-DIABETES: ICD-10-CM

## 2022-03-29 DIAGNOSIS — E78.2 MIXED HYPERLIPIDEMIA: ICD-10-CM

## 2022-03-29 DIAGNOSIS — R42 DIZZINESS: ICD-10-CM

## 2022-03-29 DIAGNOSIS — I10 BENIGN ESSENTIAL HYPERTENSION: ICD-10-CM

## 2022-03-29 DIAGNOSIS — Z13.6 ENCOUNTER FOR SCREENING FOR STENOSIS OF CAROTID ARTERY: ICD-10-CM

## 2022-03-29 DIAGNOSIS — E66.01 MORBID OBESITY WITH BMI OF 40.0-44.9, ADULT (HCC): ICD-10-CM

## 2022-03-29 LAB
ALBUMIN SERPL-MCNC: 3.9 G/DL (ref 3.4–5)
ALBUMIN/GLOB SERPL: 1.1 {RATIO} (ref 1–2)
ALP LIVER SERPL-CCNC: 87 U/L
ALT SERPL-CCNC: 20 U/L
ANION GAP SERPL CALC-SCNC: 3 MMOL/L (ref 0–18)
AST SERPL-CCNC: 13 U/L (ref 15–37)
BASOPHILS # BLD AUTO: 0.03 X10(3) UL (ref 0–0.2)
BASOPHILS NFR BLD AUTO: 0.4 %
BILIRUB SERPL-MCNC: 0.6 MG/DL (ref 0.1–2)
BUN BLD-MCNC: 16 MG/DL (ref 7–18)
CALCIUM BLD-MCNC: 9.4 MG/DL (ref 8.5–10.1)
CHLORIDE SERPL-SCNC: 104 MMOL/L (ref 98–112)
CHOLEST SERPL-MCNC: 190 MG/DL (ref ?–200)
CO2 SERPL-SCNC: 28 MMOL/L (ref 21–32)
CREAT BLD-MCNC: 1.15 MG/DL
EOSINOPHIL NFR BLD AUTO: 1.8 %
ERYTHROCYTE [DISTWIDTH] IN BLOOD BY AUTOMATED COUNT: 14.7 %
EST. AVERAGE GLUCOSE BLD GHB EST-MCNC: 131 MG/DL (ref 68–126)
FASTING PATIENT LIPID ANSWER: YES
FASTING STATUS PATIENT QL REPORTED: YES
GLOBULIN PLAS-MCNC: 3.7 G/DL (ref 2.8–4.4)
GLUCOSE BLD-MCNC: 100 MG/DL (ref 70–99)
HBA1C MFR BLD: 6.2 % (ref ?–5.7)
HCT VFR BLD AUTO: 43.7 %
HDLC SERPL-MCNC: 35 MG/DL (ref 40–59)
HGB BLD-MCNC: 14.4 G/DL
IMM GRANULOCYTES # BLD AUTO: 0.02 X10(3) UL (ref 0–1)
IMM GRANULOCYTES NFR BLD: 0.3 %
LDLC SERPL CALC-MCNC: 121 MG/DL (ref ?–100)
LYMPHOCYTES # BLD AUTO: 2.61 X10(3) UL (ref 1–4)
LYMPHOCYTES NFR BLD AUTO: 35.5 %
MCH RBC QN AUTO: 27.4 PG (ref 26–34)
MCHC RBC AUTO-ENTMCNC: 33 G/DL (ref 31–37)
MCV RBC AUTO: 83.1 FL
MONOCYTES # BLD AUTO: 0.57 X10(3) UL (ref 0.1–1)
MONOCYTES NFR BLD AUTO: 7.7 %
NEUTROPHILS # BLD AUTO: 4 X10 (3) UL (ref 1.5–7.7)
NEUTROPHILS # BLD AUTO: 4 X10(3) UL (ref 1.5–7.7)
NEUTROPHILS NFR BLD AUTO: 54.3 %
NONHDLC SERPL-MCNC: 155 MG/DL (ref ?–130)
OSMOLALITY SERPL CALC.SUM OF ELEC: 281 MOSM/KG (ref 275–295)
PLATELET # BLD AUTO: 305 10(3)UL (ref 150–450)
POTASSIUM SERPL-SCNC: 4.4 MMOL/L (ref 3.5–5.1)
PROT SERPL-MCNC: 7.6 G/DL (ref 6.4–8.2)
RBC # BLD AUTO: 5.26 X10(6)UL
SODIUM SERPL-SCNC: 135 MMOL/L (ref 136–145)
T4 FREE SERPL-MCNC: 1.2 NG/DL (ref 0.8–1.7)
TRIGL SERPL-MCNC: 193 MG/DL (ref 30–149)
TSI SER-ACNC: 4.5 MIU/ML (ref 0.36–3.74)
VIT D+METAB SERPL-MCNC: 20.8 NG/ML (ref 30–100)
VLDLC SERPL CALC-MCNC: 34 MG/DL (ref 0–30)

## 2022-03-29 PROCEDURE — 82306 VITAMIN D 25 HYDROXY: CPT

## 2022-03-29 PROCEDURE — 80053 COMPREHEN METABOLIC PANEL: CPT

## 2022-03-29 PROCEDURE — 85025 COMPLETE CBC W/AUTO DIFF WBC: CPT

## 2022-03-29 PROCEDURE — 84443 ASSAY THYROID STIM HORMONE: CPT

## 2022-03-29 PROCEDURE — 84439 ASSAY OF FREE THYROXINE: CPT

## 2022-03-29 PROCEDURE — 80061 LIPID PANEL: CPT

## 2022-03-29 PROCEDURE — 83036 HEMOGLOBIN GLYCOSYLATED A1C: CPT

## 2022-03-29 PROCEDURE — 83735 ASSAY OF MAGNESIUM: CPT

## 2022-03-29 PROCEDURE — 36415 COLL VENOUS BLD VENIPUNCTURE: CPT

## 2022-03-29 PROCEDURE — 99214 OFFICE O/P EST MOD 30 MIN: CPT | Performed by: PHYSICIAN ASSISTANT

## 2022-03-29 NOTE — PATIENT INSTRUCTIONS
Blood work today. Please use [email or web address] or call Genaro Robthelma at [phone] to set up the following tests:  - ultrasound of the carotid arteries    Neck pain:  - ice / heat (10-15 minutes at a time)  - tylenol 1,000 mg every 8 hours as needed  - home exercises    Follow up visit in September for your wellness visit.

## 2022-03-30 ENCOUNTER — TELEPHONE (OUTPATIENT)
Dept: INTERNAL MEDICINE CLINIC | Facility: CLINIC | Age: 67
End: 2022-03-30

## 2022-03-30 NOTE — TELEPHONE ENCOUNTER
Pt calling because he received results for labs through [facility], he has questions and would like to know what Gayathri Lawrence thinks. Explained that Gayathri Lawrence has not reviewed results yet and that I will send a message. Once she reviews them the nurse will call him back with further instructions.

## 2022-03-31 ENCOUNTER — HOSPITAL ENCOUNTER (OUTPATIENT)
Dept: ULTRASOUND IMAGING | Age: 67
Discharge: HOME | End: 2022-03-31
Attending: PHYSICIAN ASSISTANT
Payer: MEDICARE

## 2022-03-31 ENCOUNTER — HOSPITAL ENCOUNTER (OUTPATIENT)
Dept: CT IMAGING | Age: 67
End: 2022-03-31
Attending: INTERNAL MEDICINE
Payer: MEDICARE

## 2022-03-31 DIAGNOSIS — R42 DIZZINESS: ICD-10-CM

## 2022-03-31 DIAGNOSIS — E78.2 MIXED HYPERLIPIDEMIA: ICD-10-CM

## 2022-03-31 DIAGNOSIS — Z13.6 ENCOUNTER FOR SCREENING FOR STENOSIS OF CAROTID ARTERY: ICD-10-CM

## 2022-03-31 PROCEDURE — 93880 EXTRACRANIAL BILAT STUDY: CPT | Performed by: PHYSICIAN ASSISTANT

## 2022-03-31 RX ORDER — EZETIMIBE 10 MG/1
10 TABLET ORAL DAILY
Qty: 90 TABLET | Refills: 1 | Status: SHIPPED | OUTPATIENT
Start: 2022-03-31

## 2022-03-31 RX ORDER — MULTIVIT-MIN/IRON/FOLIC ACID/K 18-600-40
1 CAPSULE ORAL DAILY
COMMUNITY
Start: 2022-03-31

## 2022-03-31 RX ORDER — LEVOTHYROXINE SODIUM 0.12 MG/1
125 TABLET ORAL
Qty: 90 TABLET | Refills: 1 | Status: SHIPPED | OUTPATIENT
Start: 2022-03-31

## 2022-03-31 NOTE — TELEPHONE ENCOUNTER
Pt is calling because he would like Medhat Buitrago to know that he has questions about increasing his medication, he saw her message on MyChart. He says that when he had the labs drawn he hadn't taken his medication in 4 days because he was waiting for a refill and he thinks that may have affected his results. He would like to talk to nurse or Medhat Buitrago about this.

## 2022-03-31 NOTE — TELEPHONE ENCOUNTER
Patient viewed my chart message sent by TORIBIO in regards to test results. He indicates had not taken Levothyroxine for 4 days prior to blood work. He would like to confirm should still increase dose. He also states read online Zetia affects the liver, asking if there are other statin alternatives that are not so hard on the liver. Please advise.

## 2022-04-04 RX ORDER — LEVOTHYROXINE SODIUM 112 UG/1
112 TABLET ORAL
Qty: 90 TABLET | Refills: 0 | Status: SHIPPED | OUTPATIENT
Start: 2022-04-04

## 2022-05-19 ENCOUNTER — LAB ENCOUNTER (OUTPATIENT)
Dept: LAB | Age: 67
End: 2022-05-19
Attending: PHYSICIAN ASSISTANT
Payer: MEDICARE

## 2022-05-19 ENCOUNTER — OFFICE VISIT (OUTPATIENT)
Dept: INTERNAL MEDICINE CLINIC | Facility: CLINIC | Age: 67
End: 2022-05-19
Payer: MEDICARE

## 2022-05-19 VITALS
WEIGHT: 252 LBS | HEIGHT: 66 IN | OXYGEN SATURATION: 98 % | HEART RATE: 71 BPM | DIASTOLIC BLOOD PRESSURE: 80 MMHG | RESPIRATION RATE: 16 BRPM | TEMPERATURE: 98 F | BODY MASS INDEX: 40.5 KG/M2 | SYSTOLIC BLOOD PRESSURE: 138 MMHG

## 2022-05-19 DIAGNOSIS — E66.01 MORBID OBESITY WITH BMI OF 40.0-44.9, ADULT (HCC): ICD-10-CM

## 2022-05-19 DIAGNOSIS — E03.9 HYPOTHYROIDISM, UNSPECIFIED TYPE: ICD-10-CM

## 2022-05-19 DIAGNOSIS — E78.5 HYPERLIPIDEMIA, UNSPECIFIED HYPERLIPIDEMIA TYPE: ICD-10-CM

## 2022-05-19 DIAGNOSIS — I10 ESSENTIAL HYPERTENSION: Primary | ICD-10-CM

## 2022-05-19 DIAGNOSIS — M17.0 BILATERAL PRIMARY OSTEOARTHRITIS OF KNEE: ICD-10-CM

## 2022-05-19 LAB — TSI SER-ACNC: 2.38 MIU/ML (ref 0.36–3.74)

## 2022-05-19 PROCEDURE — 99214 OFFICE O/P EST MOD 30 MIN: CPT | Performed by: PHYSICIAN ASSISTANT

## 2022-05-19 PROCEDURE — 84443 ASSAY THYROID STIM HORMONE: CPT

## 2022-05-19 PROCEDURE — 36415 COLL VENOUS BLD VENIPUNCTURE: CPT

## 2022-05-19 RX ORDER — AMLODIPINE BESYLATE 5 MG/1
5 TABLET ORAL NIGHTLY
COMMUNITY
Start: 2022-05-19

## 2022-05-19 NOTE — PATIENT INSTRUCTIONS
Hospitalist Progress Note      PCP: Destin Cheney, APRN - CNP    Date of Admission: 6/6/2021    Chief Complaint:   Abdominal Pain        stent placed in right groin in may, not sure if that is causing her to have abd pain now. Hospital Course: Hortencia Little is a 81 y.o. female who presented to the ED to be evaluated for acutely worsening RLQ abdominal pain ongoing for greater than 1 weeks duration. Patient endorses right inguinal \"bulge\" where she reports vascular surgeon Dr. Ana Lilia Garland gained access for mesenteric stent placement on 5/10/2021. Upon arrival to the ED, CTA of the abdominal aorta was performed and notable for right inguinal hernia with possible incarcerated small bowel's content; consistent with evolving SBO. Further mention was made of mesenterorenal arterial disease with patent proximal celiac stent. Labs were obtained revealing mild lipase elevation at 91 as well as small troponin elevation 0.02 possibly due to malignant hypertensive urgency presentation. EKG revealed  left axis deviation, LBBB, and rhythm aberrancy. Hospitalist team consulted to admit this patient for early small bowel obstruction with concomitant malignant hypertensive urgency/troponin leak. Subjective: EMR notes reviewed patient seen and examined. S/P Robotic righ inguinal hernia repair. Post op doing well up in chair tolerating PO, no c/p pain.        Medications:  Reviewed    Infusion Medications    sodium chloride      heparin (PORCINE) Infusion Stopped (06/09/21 0603)     Scheduled Medications    sodium chloride flush  10 mL Intravenous 2 times per day    pantoprazole  40 mg Intravenous Daily     PRN Meds: perflutren lipid microspheres, LORazepam, sodium chloride flush, sodium chloride, potassium chloride **OR** potassium alternative oral replacement **OR** potassium chloride, magnesium sulfate, promethazine **OR** ondansetron, acetaminophen **OR** acetaminophen, bisacodyl, heparin (porcine), heparin TSH and hepatic function panel today. Tell the lab NOT to draw lipid test.    Blood pressure:  - start amlodipine 5 mg EVERY night  - send [place] your home BP readings in 2 weeks    Cholesterol:  - continue to work with Dr. Kathleen Beck office to hopefully get Oli Massey covered by insurance  Please focus on a diet consisting of lean or plant based proteins, fruits, veggies, and whole grains, as well as regular exercise (30 minutes daily).     Knee pain:  - may take tylenol (acetaminophen) 1,000 mg every 8 hours as needed (do not exceed 3,000 mg daily)  - may take ibuprofen 400-600 mg every 8 hours WITH food as needed (no more than 1-2 weeks at a time)  - ice (10-15 minutes at a time)  - follow up with Dr. Rosalia Alves next month as planned for possible injection (porcine), morphine **OR** morphine, phenol      Intake/Output Summary (Last 24 hours) at 6/10/2021 1034  Last data filed at 6/10/2021 0948  Gross per 24 hour   Intake 1200 ml   Output 410 ml   Net 790 ml       Physical Exam Performed:    BP (!) 167/98   Pulse 103   Temp 97.8 °F (36.6 °C) (Oral)   Resp 16   Ht 5' 3\" (1.6 m)   Wt 89 lb 4.8 oz (40.5 kg)   SpO2 95%   BMI 15.82 kg/m²     General appearance: No apparent distress, appears stated age and cooperative. Anxious  HEENT: Pupils equal, round, and reactive to light. Conjunctivae/corneas clear. Neck: Supple, with full range of motion. No jugular venous distention. Trachea midline. NG in place  Respiratory:  Normal respiratory effort. Clear to auscultation, bilaterally without Rales/Wheezes/Rhonchi. Cardiovascular: Regular rate and rhythm with normal S1/S2 without murmurs, rubs or gallops. Abdomen: Soft, tender, non-distended, + BS   Musculoskeletal: No clubbing, cyanosis or edema bilaterally. Full range of motion without deformity. Skin: Skin color, texture, turgor normal.  No rashes or lesions. Neurologic:  Neurovascularly intact without any focal sensory/motor deficits. Cranial nerves: II-XII intact, grossly non-focal.  Psychiatric: Alert and oriented, thought content appropriate, normal insight  Capillary Refill: Brisk,3 seconds, normal   Peripheral Pulses: +2 palpable, equal bilaterally       Labs:   Recent Labs     06/08/21  1009 06/09/21  0504 06/10/21  0435   WBC 5.1 4.6 7.3   HGB 12.2 12.4 13.2   HCT 35.8* 36.7 39.8    189 235     Recent Labs     06/08/21  1009 06/09/21  0504 06/10/21  0435    136 137   K 3.8 3.8 4.5    99 97*   CO2 29 27 25   BUN 10 10 23*   CREATININE 0.7 0.6 1.1   CALCIUM 9.8 10.1 10.4     No results for input(s): AST, ALT, BILIDIR, BILITOT, ALKPHOS in the last 72 hours.   Recent Labs     06/09/21  0507   INR 1.15*     Recent Labs     06/07/21  1057 06/07/21  1356   TROPONINI 0.01 0.01       Urinalysis: Lab Results   Component Value Date    NITRU Negative 06/06/2021    WBCUA 0-2 06/06/2021    BACTERIA 4+ 07/07/2020    RBCUA 0-2 06/06/2021    BLOODU Negative 06/06/2021    SPECGRAV 1.010 06/06/2021    GLUCOSEU Negative 06/06/2021       Radiology:  XR ACUTE ABD SERIES CHEST 1 VW   Final Result   Malposition of nasogastric tube as above, the tube should be advanced. The findings were sent to the Radiology Results [address] at 8:43   am on 6/7/2021to be communicated to a licensed caregiver. XR ABDOMEN (KUB) (SINGLE AP VIEW)   Final Result   Enteric catheter side port just beyond the GE junction with the tip in the   proximal gastric body. XR ABDOMEN (KUB) (SINGLE AP VIEW)   Final Result   Unremarkable limited KUB. NG tube tip projects at the left upper quadrant, overlying the expected   location of the stomach. Side port of the NG tube projects at the distal   esophagus esophagogastric junction level. Consider dancing mid NG tube   several cm such that the side port is beyond esophagogastric junction. CTA ABDOMINAL AORTA W BILAT RUNOFF W CONTRAST   Final Result   No pseudoaneurysm is identified in the groins. There is a right inguinal hernia with small bowel content, possibly   incarcerated segment. There is mild dilation of the upstream small bowel,   consistent with evolving small bowel obstruction. Small amount of free fluid   is also noted. Mesenterorenal arterial disease. Celiac trunk stent placed in interval is   widely patent. Mild proximal SMA stenosis stable. Severe right and mild   left proximal renal artery stenoses stable. Aortoiliac vessels are widely patent. Femoropopliteal segments are patent, with mild, variable stenoses. Right anterior tibial and left peroneal one- vessel runoff. Small bilateral pleural effusions and bibasilar atelectasis.                  Assessment/Plan:    Active Hospital Problems    Diagnosis     Incarcerated right inguinal hernia [K40.30]     SBO (small bowel obstruction) ([facility] [address] [K56.609]     PAD (peripheral artery disease) ([facility]) [I73.9]     Celiac artery stenosis ([facility]) [I77.4]     Malignant hypertensive urgency [I16.0]     PAF (paroxysmal atrial fibrillation) ([facility]) [I48.0]     Aneurysm of thoracic aorta ([facility]) [I71.2]      Malignant hypertensive urgency with troponin leak  - POA with -170's 100's, initial trop was 0.02, trended out 0.01> 0.01  -EKG obtained in ED;without evidence of LAD or acute ischemia  -Home medications placed on hold due to strict n.p.o. status  -IV hydralazine scheduled PRN (with parameters) to address extreme BP elevation  -ECHO scheduled to further assess cardiac structure and function  - suspect some anxiety and pain component  - improved but remains higher than would like     Inguinal hernia with SBO  -Consultation placed to surgery with plans for conservative management overnight  -NG tube in place, and patient remains strict n.p.o., improving NG out trialing clears   -Serial lactate levels to be trended  -IV morphine available sparingly for pain control as needed  -Due to celiac stent as well as PAF, anticoagulation unable to be discontinued abruptly; patient initiated on heparin GTT bridge  - 6/9 underwent right hernia repair      Unintentional weight loss with cachexia  -Etiology unclear at this time; concern for underlying occult process/malignancy  -TSH/ free T4, vitamin D, B12/folate, prealbumin and A1c lab work ordered - WNL  -ECHO scheduled to further assess cardiac function  -PT/OT consultation placed for evaluation and treatment  -Consultation placed to GI for further evaluation of malabsorption/anorexia     Chronic anticoagulation  -Patient with history of PAF as well as celiac artery stenosis typically maintained on Eliquis 2.5 mg twice daily  -Due to n.p.o. status, patient was initiated on low-dose weight-based heparin drip (without initial bolus) for bridging therapy   - can resume Oral when OK with surgery     Underlying anxiety: family states that she is an anxious person has been benefiting   - has benefited from prn low dose ativan   - pt would likely from oral antianxiety such as Remeron     DVT Prophylaxis: low-dose weight-based heparin drip initiated  Diet: ADULT DIET; Easy to Chew  Code Status: Full Code    PT/OT Eval Status: ordered     Dispo - pending clinical course     Jason Yanez, APRN - CNP

## 2022-06-23 ENCOUNTER — HOSPITAL ENCOUNTER (OUTPATIENT)
Dept: GENERAL RADIOLOGY | Age: 67
Discharge: HOME | End: 2022-06-23
Attending: INTERNAL MEDICINE
Payer: MEDICARE

## 2022-06-23 ENCOUNTER — TELEMEDICINE (OUTPATIENT)
Dept: INTERNAL MEDICINE CLINIC | Facility: CLINIC | Age: 67
End: 2022-06-23

## 2022-06-23 DIAGNOSIS — I10 HYPERTENSION, UNSPECIFIED TYPE: ICD-10-CM

## 2022-06-23 DIAGNOSIS — R05.8 POST-VIRAL COUGH SYNDROME: Primary | ICD-10-CM

## 2022-06-23 DIAGNOSIS — K21.9 GASTROESOPHAGEAL REFLUX DISEASE, UNSPECIFIED WHETHER ESOPHAGITIS PRESENT: ICD-10-CM

## 2022-06-23 DIAGNOSIS — R05.9 COUGH: ICD-10-CM

## 2022-06-23 DIAGNOSIS — R05.8 POST-VIRAL COUGH SYNDROME: ICD-10-CM

## 2022-06-23 PROCEDURE — 99214 OFFICE O/P EST MOD 30 MIN: CPT | Performed by: INTERNAL MEDICINE

## 2022-06-23 PROCEDURE — 71046 X-RAY EXAM CHEST 2 VIEWS: CPT | Performed by: INTERNAL MEDICINE

## 2022-06-23 RX ORDER — BENZONATATE 200 MG/1
200 CAPSULE ORAL 3 TIMES DAILY PRN
Qty: 30 CAPSULE | Refills: 0 | Status: SHIPPED | OUTPATIENT
Start: 2022-06-23

## 2022-06-23 RX ORDER — FAMOTIDINE 20 MG/1
20 TABLET, FILM COATED ORAL 2 TIMES DAILY
Qty: 90 TABLET | Refills: 0 | Status: SHIPPED | OUTPATIENT
Start: 2022-06-23

## 2022-06-23 RX ORDER — ALBUTEROL SULFATE 90 UG/1
2 AEROSOL, METERED RESPIRATORY (INHALATION)
Qty: 1 EACH | Refills: 0 | Status: SHIPPED | OUTPATIENT
Start: 2022-06-23

## 2022-07-07 ENCOUNTER — TELEPHONE (OUTPATIENT)
Dept: INTERNAL MEDICINE CLINIC | Facility: CLINIC | Age: 67
End: 2022-07-07

## 2022-07-07 DIAGNOSIS — R73.01 IFG (IMPAIRED FASTING GLUCOSE): ICD-10-CM

## 2022-07-07 DIAGNOSIS — E03.9 HYPOTHYROIDISM, UNSPECIFIED TYPE: ICD-10-CM

## 2022-07-07 DIAGNOSIS — R73.03 PREDIABETES: ICD-10-CM

## 2022-07-07 DIAGNOSIS — E55.9 HYPOVITAMINOSIS D: Primary | ICD-10-CM

## 2022-07-07 DIAGNOSIS — R53.83 FATIGUE, UNSPECIFIED TYPE: ICD-10-CM

## 2022-07-07 NOTE — TELEPHONE ENCOUNTER
Pt requesting orders for lab work. He is needing vitamin D, thryoid, and Iron checked. Pt also requesting cb from Leonora Starr (asked if a nurse call is ok, pt declined) did not disclose reason why, he says \"Essence knows his history\".

## 2022-07-07 NOTE — TELEPHONE ENCOUNTER
LL, there is uncollected Lipid panel from 3/31 and uncollected hepatic function panel from 4/4. 3 labs pended from this morning. Pt is wondering if famotidine may be causing his increased joint pain. Please advise on labs or send pt [facility]. ty    Pt returned call, explained that his older brother went into the hospital last week right before his appt with Dr. Anika York on 6/29 and pt had to cancel. Pt's brother passed away unexpectedly. Left a big hole in pt's life. Pt reports increased joint pain which is decreasing his movement. Although new famotidine from VV 6/23 with DV helped cough tremendously, pt wonders if it may be causing the increased joint pain. Pt is just asking that all his labs get entered, then if we could send him [facility] or leave VM that everything is ready so he can be sure to get them all at once. BP readings: (takes morning and evening)  6/27 - 139/78, 137/78  6/28 - 146/77, 125/62  6/29 - 147/72, 138/74  6/30 - 136/73, 136/82  7/1 - 141/76, 142/78  7/2 - 140/67, 133/72  7/4 - 137/72, 143/77  7/5 - 133/82, 124/71  7/6 - 149/85, 134/61  7/7 - 141/77,    Provided pt with supportive/active listening. Encouraged pt to reschedule with Dr. Anika York asa and ask to be put on the wait list for any cancellations. Pt would appreciate if LL or DV could assist with getting him in sooner with Dr. Anika York.      Length of call: 21 minutes

## 2022-07-08 NOTE — TELEPHONE ENCOUNTER
Brissa, thank you for speaking with patient. Lab orders placed and I have sent him a [email or web address] message.

## 2022-07-12 ENCOUNTER — LAB ENCOUNTER (OUTPATIENT)
Dept: LAB | Age: 67
End: 2022-07-12
Attending: PHYSICIAN ASSISTANT
Payer: MEDICARE

## 2022-07-12 DIAGNOSIS — E55.9 HYPOVITAMINOSIS D: ICD-10-CM

## 2022-07-12 DIAGNOSIS — R73.03 PREDIABETES: ICD-10-CM

## 2022-07-12 DIAGNOSIS — E78.2 MIXED HYPERLIPIDEMIA: ICD-10-CM

## 2022-07-12 DIAGNOSIS — R53.83 FATIGUE, UNSPECIFIED TYPE: ICD-10-CM

## 2022-07-12 DIAGNOSIS — E03.9 HYPOTHYROIDISM, UNSPECIFIED TYPE: ICD-10-CM

## 2022-07-12 DIAGNOSIS — R73.01 IFG (IMPAIRED FASTING GLUCOSE): ICD-10-CM

## 2022-07-12 DIAGNOSIS — E78.5 HYPERLIPIDEMIA, UNSPECIFIED HYPERLIPIDEMIA TYPE: ICD-10-CM

## 2022-07-12 LAB
ALBUMIN SERPL-MCNC: 3.7 G/DL (ref 3.4–5)
ALP LIVER SERPL-CCNC: 87 U/L
ALT SERPL-CCNC: 18 U/L
AST SERPL-CCNC: 12 U/L (ref 15–37)
BILIRUB DIRECT SERPL-MCNC: 0.1 MG/DL (ref 0–0.2)
BILIRUB SERPL-MCNC: 0.5 MG/DL (ref 0.1–2)
CHOLEST SERPL-MCNC: 180 MG/DL (ref ?–200)
DEPRECATED HBV CORE AB SER IA-ACNC: 14.6 NG/ML
EST. AVERAGE GLUCOSE BLD GHB EST-MCNC: 131 MG/DL (ref 68–126)
FASTING PATIENT LIPID ANSWER: YES
HBA1C MFR BLD: 6.2 % (ref ?–5.7)
HDLC SERPL-MCNC: 36 MG/DL (ref 40–59)
IRON SATN MFR SERPL: 25 %
IRON SERPL-MCNC: 103 UG/DL
LDLC SERPL CALC-MCNC: 108 MG/DL (ref ?–100)
NONHDLC SERPL-MCNC: 144 MG/DL (ref ?–130)
PROT SERPL-MCNC: 7.9 G/DL (ref 6.4–8.2)
TIBC SERPL-MCNC: 414 UG/DL (ref 240–450)
TRANSFERRIN SERPL-MCNC: 278 MG/DL (ref 200–360)
TRIGL SERPL-MCNC: 207 MG/DL (ref 30–149)
TSI SER-ACNC: 2.5 MIU/ML (ref 0.36–3.74)
VIT D+METAB SERPL-MCNC: 20.3 NG/ML (ref 30–100)
VLDLC SERPL CALC-MCNC: 35 MG/DL (ref 0–30)

## 2022-07-12 PROCEDURE — 83540 ASSAY OF IRON: CPT

## 2022-07-12 PROCEDURE — 82728 ASSAY OF FERRITIN: CPT

## 2022-07-12 PROCEDURE — 84443 ASSAY THYROID STIM HORMONE: CPT

## 2022-07-12 PROCEDURE — 83550 IRON BINDING TEST: CPT

## 2022-07-12 PROCEDURE — 82306 VITAMIN D 25 HYDROXY: CPT

## 2022-07-12 PROCEDURE — 80076 HEPATIC FUNCTION PANEL: CPT

## 2022-07-12 PROCEDURE — 36415 COLL VENOUS BLD VENIPUNCTURE: CPT

## 2022-07-12 PROCEDURE — 80061 LIPID PANEL: CPT

## 2022-07-12 PROCEDURE — 85025 COMPLETE CBC W/AUTO DIFF WBC: CPT

## 2022-07-12 PROCEDURE — 83036 HEMOGLOBIN GLYCOSYLATED A1C: CPT

## 2022-07-13 LAB
BASOPHILS # BLD AUTO: 0.04 X10(3) UL (ref 0–0.2)
BASOPHILS NFR BLD AUTO: 0.5 %
EOSINOPHIL # BLD AUTO: 0.13 X10(3) UL (ref 0–0.7)
EOSINOPHIL NFR BLD AUTO: 1.7 %
ERYTHROCYTE [DISTWIDTH] IN BLOOD BY AUTOMATED COUNT: 14.9 %
HCT VFR BLD AUTO: 44.9 %
HGB BLD-MCNC: 14.5 G/DL
IMM GRANULOCYTES # BLD AUTO: 0.02 X10(3) UL (ref 0–1)
IMM GRANULOCYTES NFR BLD: 0.3 %
LYMPHOCYTES # BLD AUTO: 2.39 X10(3) UL (ref 1–4)
LYMPHOCYTES NFR BLD AUTO: 30.8 %
MCH RBC QN AUTO: 27.3 PG (ref 26–34)
MCHC RBC AUTO-ENTMCNC: 32.3 G/DL (ref 31–37)
MCV RBC AUTO: 84.4 FL
MONOCYTES # BLD AUTO: 0.59 X10(3) UL (ref 0.1–1)
MONOCYTES NFR BLD AUTO: 7.6 %
NEUTROPHILS # BLD AUTO: 4.58 X10 (3) UL (ref 1.5–7.7)
NEUTROPHILS # BLD AUTO: 4.58 X10(3) UL (ref 1.5–7.7)
NEUTROPHILS NFR BLD AUTO: 59.1 %
PLATELET # BLD AUTO: 315 10(3)UL (ref 150–450)
RBC # BLD AUTO: 5.32 X10(6)UL
WBC # BLD AUTO: 7.8 X10(3) UL (ref 4–11)

## 2022-07-14 ENCOUNTER — TELEPHONE (OUTPATIENT)
Dept: INTERNAL MEDICINE CLINIC | Facility: CLINIC | Age: 67
End: 2022-07-14

## 2022-07-14 NOTE — TELEPHONE ENCOUNTER
Sent [facility] to  explaining that we will contact him as soon as Errol Biswas has reviewed all of his results.

## 2022-07-21 ENCOUNTER — OFFICE VISIT (OUTPATIENT)
Dept: INTERNAL MEDICINE CLINIC | Facility: CLINIC | Age: 67
End: 2022-07-21
Payer: MEDICARE

## 2022-07-21 VITALS
HEART RATE: 70 BPM | DIASTOLIC BLOOD PRESSURE: 76 MMHG | SYSTOLIC BLOOD PRESSURE: 122 MMHG | TEMPERATURE: 98 F | BODY MASS INDEX: 41 KG/M2 | OXYGEN SATURATION: 99 % | WEIGHT: 252 LBS | RESPIRATION RATE: 15 BRPM

## 2022-07-21 DIAGNOSIS — G89.29 BILATERAL CHRONIC KNEE PAIN: Primary | ICD-10-CM

## 2022-07-21 DIAGNOSIS — R73.03 PREDIABETES: ICD-10-CM

## 2022-07-21 DIAGNOSIS — F41.9 ANXIETY: ICD-10-CM

## 2022-07-21 DIAGNOSIS — E61.1 IRON DEFICIENCY: ICD-10-CM

## 2022-07-21 DIAGNOSIS — M25.561 BILATERAL CHRONIC KNEE PAIN: Primary | ICD-10-CM

## 2022-07-21 DIAGNOSIS — M25.562 BILATERAL CHRONIC KNEE PAIN: Primary | ICD-10-CM

## 2022-07-21 DIAGNOSIS — E55.9 VITAMIN D DEFICIENCY: ICD-10-CM

## 2022-07-21 DIAGNOSIS — E78.2 MIXED HYPERLIPIDEMIA: ICD-10-CM

## 2022-07-21 PROCEDURE — 99214 OFFICE O/P EST MOD 30 MIN: CPT | Performed by: PHYSICIAN ASSISTANT

## 2022-07-21 RX ORDER — ERGOCALCIFEROL 1.25 MG/1
50000 CAPSULE ORAL WEEKLY
Qty: 12 CAPSULE | Refills: 0 | Status: SHIPPED | OUTPATIENT
Start: 2022-07-21 | End: 2022-10-07

## 2022-07-21 NOTE — PATIENT INSTRUCTIONS
Low vitamin D:  - start prescription vitamin D (50,000 I U ) once a WEEK x 12 weeks  - then start over the counter vitamin D3 4,000 I U daily    Low iron:  - start over the counter ferrous sulfate 325 mg - 1 tablet once a day  - may use over the counter stool softener if needed for constipation    Repeat blood work in 2 months (after starting Praluent per cardiology office). Psychiatry and Individual Therapy Resources    Individual Therapy:    [facility]  [address]   Tel: [phone]  Fax: [phone]  [email or web address]      [facility] Psy[facility], [address] [place] [address]  Call Ayanna Parish  [phone] [address], [address],  [address]  [phone]      [address]  [place], [address]  [phone]  [email or web address]  [facility]. [facility]  [address] [place] [address] [address],  [address] [facility], [facility] [address] [facility]  [phone]  [email or web address]      [facility] [address]  [address] [address] [facility] [address] [facility]  [phone]      [facility] (Multiple locations)  [address] [place] [address].  3 [facility]  [phone]  [email or web address]

## 2022-07-27 ENCOUNTER — OFFICE VISIT (OUTPATIENT)
Dept: ORTHOPEDICS CLINIC | Facility: CLINIC | Age: 67
End: 2022-07-27
Payer: MEDICARE

## 2022-07-27 VITALS — OXYGEN SATURATION: 98 % | HEART RATE: 79 BPM

## 2022-07-27 DIAGNOSIS — M17.12 PRIMARY OSTEOARTHRITIS OF LEFT KNEE: Primary | ICD-10-CM

## 2022-07-27 PROCEDURE — 1125F AMNT PAIN NOTED PAIN PRSNT: CPT | Performed by: PHYSICIAN ASSISTANT

## 2022-07-27 PROCEDURE — 99213 OFFICE O/P EST LOW 20 MIN: CPT | Performed by: PHYSICIAN ASSISTANT

## 2022-07-27 PROCEDURE — 20610 DRAIN/INJ JOINT/BURSA W/O US: CPT | Performed by: PHYSICIAN ASSISTANT

## 2022-07-27 RX ORDER — TRIAMCINOLONE ACETONIDE 40 MG/ML
40 INJECTION, SUSPENSION INTRA-ARTICULAR; INTRAMUSCULAR ONCE
Status: COMPLETED | OUTPATIENT
Start: 2022-07-27 | End: 2022-07-27

## 2022-07-27 RX ADMIN — TRIAMCINOLONE ACETONIDE 40 MG: 40 INJECTION, SUSPENSION INTRA-ARTICULAR; INTRAMUSCULAR at 12:01:00

## 2022-07-27 NOTE — PROGRESS NOTES
[facility] Progress Note    Subjective: Patient returns to clinic after previously being seen on 10/4/2021 for bilateral knee pain. He reports the left knee continues to be more painful than the right knee. He reports that his pain has progressively been worsening since last visit. He no longer experiences sufficient pain relief from Aleve. Pain continues to be located along the left medial joint line with radiation across the anterior knee which worsens with walking and has significantly debilitated his function. He also continues to experience 4/10 intensity pain affecting the right knee. Objective: Patient lacks approximately 5 degrees of extension of his left knee. Left knee flexion to approximately 120 degrees with discomfort. No significant tenderness to palpation over the medial or lateral joint lines. No significant tenderness to palpation of the proximal or distal poles of the patella. Knee is stable to valgus and varus stress at 0 and 30 degrees. No laxity with anterior or posterior drawer. Assessment/Plan: Patient with moderate to severe osteoarthritis affecting his left knee as well as osteoarthritis of the right knee. Treatment options were discussed again with the patient including physical therapy, cortisone injection, anti-inflammatory medication. Since his symptoms have failed to improve with anti-inflammatory medication, he is interested in a cortisone injection into the left knee. Cortisone injection was administered at today's visit. Please see separate procedure note for additional details. I discussed that the cortisone injection ideally would improve his symptoms for at least 3 months. The cortisone injection can be repeated if it is at least 3 months apart. All questions were invited and answered. The patient was happy with the plan and will follow as advised.     Ellie Huerta PA-C  [facility] Orthopedic Surgery    This note was dictated using Dragon software. While it was briefly proofread prior to completion, some grammatical, spelling, and word choice errors due to dictation may still occur.

## 2022-07-27 NOTE — PROCEDURES
After informed consent, the patient's left knee was marked, locally anesthetized with skin refrigerant, prepped with topical antiseptic, and injected with a mixture of 1mL 40mg/mL Kenalog, 2mL 1% lidocaine and 2mL 0.5% marcaine through the inferolateral portal.  A band-aid was applied. The patient tolerated the procedure well.     Ellie Huerta PA-C  [address] Orthopedic Surgery

## 2022-08-17 ENCOUNTER — OFFICE VISIT (OUTPATIENT)
Dept: ORTHOPEDICS CLINIC | Facility: CLINIC | Age: 67
End: 2022-08-17
Payer: MEDICARE

## 2022-08-17 DIAGNOSIS — M17.11 PRIMARY OSTEOARTHRITIS OF RIGHT KNEE: Primary | ICD-10-CM

## 2022-08-17 PROCEDURE — 20610 DRAIN/INJ JOINT/BURSA W/O US: CPT | Performed by: PHYSICIAN ASSISTANT

## 2022-08-17 RX ORDER — TRIAMCINOLONE ACETONIDE 40 MG/ML
40 INJECTION, SUSPENSION INTRA-ARTICULAR; INTRAMUSCULAR ONCE
Status: COMPLETED | OUTPATIENT
Start: 2022-08-17 | End: 2022-08-17

## 2022-08-17 RX ADMIN — TRIAMCINOLONE ACETONIDE 40 MG: 40 INJECTION, SUSPENSION INTRA-ARTICULAR; INTRAMUSCULAR at 11:18:00

## 2022-08-17 NOTE — PROCEDURES
After informed consent, the patient's right knee was marked, locally anesthetized with skin refrigerant, prepped with topical antiseptic, and injected with a mixture of 1mL 40mg/mL Kenalog, 2mL 1% lidocaine and 2mL 0.5% marcaine through the inferolateral portal.  A band-aid was applied. The patient tolerated the procedure well. Patient's right knee began to flareup after recent trip to [place]. Pain is located along the medial knee. Radiographs from May 2021 demonstrate moderate to severe medial compartmental joint space narrowing. Patient was provided with an exercise packet with knee conditioning exercises and he will contact us if interested in physical therapy.     Cristina Fisher PA-C  [address] Orthopedic Surgery

## 2022-09-27 RX ORDER — LEVOTHYROXINE SODIUM 112 UG/1
112 TABLET ORAL
Qty: 90 TABLET | Refills: 1 | Status: SHIPPED | OUTPATIENT
Start: 2022-09-27

## 2022-09-27 NOTE — TELEPHONE ENCOUNTER
LOV: 7/21/2022 with Gabriela Tolbert PA-C  RTC: September 2022  Last Relevant Labs: 7/12/2022  Filled: 4/4/2022    #90 with 0 refills    No future appointments.

## 2022-10-06 ENCOUNTER — TELEPHONE (OUTPATIENT)
Dept: INTERNAL MEDICINE CLINIC | Facility: CLINIC | Age: 67
End: 2022-10-06

## 2022-10-06 ENCOUNTER — OFFICE VISIT (OUTPATIENT)
Dept: INTERNAL MEDICINE CLINIC | Facility: CLINIC | Age: 67
End: 2022-10-06
Payer: MEDICARE

## 2022-10-06 VITALS
BODY MASS INDEX: 41.05 KG/M2 | HEART RATE: 71 BPM | OXYGEN SATURATION: 99 % | HEIGHT: 65.75 IN | RESPIRATION RATE: 18 BRPM | TEMPERATURE: 98 F | SYSTOLIC BLOOD PRESSURE: 132 MMHG | DIASTOLIC BLOOD PRESSURE: 80 MMHG | WEIGHT: 252.38 LBS

## 2022-10-06 DIAGNOSIS — I10 ESSENTIAL HYPERTENSION: Primary | ICD-10-CM

## 2022-10-06 DIAGNOSIS — G47.33 OSA (OBSTRUCTIVE SLEEP APNEA): ICD-10-CM

## 2022-10-06 DIAGNOSIS — R06.9 ABNORMALITY OF BREATHING: ICD-10-CM

## 2022-10-06 DIAGNOSIS — I25.10 CORONARY ARTERY DISEASE INVOLVING NATIVE CORONARY ARTERY OF NATIVE HEART WITHOUT ANGINA PECTORIS: ICD-10-CM

## 2022-10-06 PROCEDURE — 99215 OFFICE O/P EST HI 40 MIN: CPT | Performed by: INTERNAL MEDICINE

## 2022-10-06 PROCEDURE — 93000 ELECTROCARDIOGRAM COMPLETE: CPT | Performed by: INTERNAL MEDICINE

## 2022-10-06 NOTE — TELEPHONE ENCOUNTER
HEATH    Spoke with patient. Reports elevated BP x2 days. Yesterday BP was 175/90, took amlodipine and 2-2.5 hrs later BP was 167/84. Reports he is not feelibg well, +lightheaded, +dizzy. Denies any CP, SOB, HA or vision changes. This morning BP was 167/90 prior to meds. Has not re-checked. Advised patient he should be evaluated in office. Scheduled for 11:20 appt with DV. Patient stated he was ok to drive as he is less than a mile away. Advised to seek care sooner or call 911 if he develops any new or worsening symptoms. Verbalized understanding.

## 2022-10-07 ENCOUNTER — HOSPITAL ENCOUNTER (OUTPATIENT)
Dept: GENERAL RADIOLOGY | Age: 67
Discharge: HOME | End: 2022-10-07
Attending: INTERNAL MEDICINE
Payer: MEDICARE

## 2022-10-07 DIAGNOSIS — R06.9 ABNORMALITY OF BREATHING: ICD-10-CM

## 2022-10-07 DIAGNOSIS — G47.33 OSA (OBSTRUCTIVE SLEEP APNEA): ICD-10-CM

## 2022-10-07 PROCEDURE — 71046 X-RAY EXAM CHEST 2 VIEWS: CPT | Performed by: INTERNAL MEDICINE

## 2022-10-21 ENCOUNTER — TELEPHONE (OUTPATIENT)
Dept: INTERNAL MEDICINE CLINIC | Facility: CLINIC | Age: 67
End: 2022-10-21

## 2022-10-21 ENCOUNTER — LAB ENCOUNTER (OUTPATIENT)
Dept: LAB | Age: 67
End: 2022-10-21
Attending: PHYSICIAN ASSISTANT
Payer: MEDICARE

## 2022-10-21 DIAGNOSIS — R73.03 PREDIABETES: ICD-10-CM

## 2022-10-21 DIAGNOSIS — Z00.00 LABORATORY EXAM ORDERED AS PART OF ROUTINE GENERAL MEDICAL EXAMINATION: Primary | ICD-10-CM

## 2022-10-21 DIAGNOSIS — E55.9 VITAMIN D DEFICIENCY: ICD-10-CM

## 2022-10-21 DIAGNOSIS — R73.01 IFG (IMPAIRED FASTING GLUCOSE): ICD-10-CM

## 2022-10-21 DIAGNOSIS — Z00.00 LABORATORY EXAM ORDERED AS PART OF ROUTINE GENERAL MEDICAL EXAMINATION: ICD-10-CM

## 2022-10-21 DIAGNOSIS — E61.1 IRON DEFICIENCY: ICD-10-CM

## 2022-10-21 DIAGNOSIS — E78.2 MIXED HYPERLIPIDEMIA: ICD-10-CM

## 2022-10-21 LAB
ALBUMIN SERPL-MCNC: 3.8 G/DL (ref 3.4–5)
ALP LIVER SERPL-CCNC: 89 U/L
ALT SERPL-CCNC: 27 U/L
AST SERPL-CCNC: 18 U/L (ref 15–37)
BASOPHILS # BLD AUTO: 0.03 X10(3) UL (ref 0–0.2)
BASOPHILS NFR BLD AUTO: 0.3 %
BILIRUB DIRECT SERPL-MCNC: <0.1 MG/DL (ref 0–0.2)
BILIRUB SERPL-MCNC: 0.6 MG/DL (ref 0.1–2)
DEPRECATED HBV CORE AB SER IA-ACNC: 23.5 NG/ML
EOSINOPHIL # BLD AUTO: 0.05 X10(3) UL (ref 0–0.7)
EOSINOPHIL NFR BLD AUTO: 0.6 %
ERYTHROCYTE [DISTWIDTH] IN BLOOD BY AUTOMATED COUNT: 15.8 %
EST. AVERAGE GLUCOSE BLD GHB EST-MCNC: 131 MG/DL (ref 68–126)
HBA1C MFR BLD: 6.2 % (ref ?–5.7)
HCT VFR BLD AUTO: 46.8 %
HGB BLD-MCNC: 15.1 G/DL
IMM GRANULOCYTES # BLD AUTO: 0.02 X10(3) UL (ref 0–1)
IMM GRANULOCYTES NFR BLD: 0.2 %
IRON SATN MFR SERPL: 21 %
IRON SERPL-MCNC: 95 UG/DL
LYMPHOCYTES # BLD AUTO: 3.02 X10(3) UL (ref 1–4)
LYMPHOCYTES NFR BLD AUTO: 33.4 %
MCH RBC QN AUTO: 27.9 PG (ref 26–34)
MCHC RBC AUTO-ENTMCNC: 32.3 G/DL (ref 31–37)
MCV RBC AUTO: 86.3 FL
MONOCYTES # BLD AUTO: 0.69 X10(3) UL (ref 0.1–1)
MONOCYTES NFR BLD AUTO: 7.6 %
NEUTROPHILS # BLD AUTO: 5.23 X10 (3) UL (ref 1.5–7.7)
NEUTROPHILS # BLD AUTO: 5.23 X10(3) UL (ref 1.5–7.7)
NEUTROPHILS NFR BLD AUTO: 57.9 %
PLATELET # BLD AUTO: 318 10(3)UL (ref 150–450)
PROT SERPL-MCNC: 8.5 G/DL (ref 6.4–8.2)
RBC # BLD AUTO: 5.42 X10(6)UL
TIBC SERPL-MCNC: 453 UG/DL (ref 240–450)
TRANSFERRIN SERPL-MCNC: 304 MG/DL (ref 200–360)
TSI SER-ACNC: 2.49 MIU/ML (ref 0.36–3.74)
VIT D+METAB SERPL-MCNC: 36 NG/ML (ref 30–100)
WBC # BLD AUTO: 9 X10(3) UL (ref 4–11)

## 2022-10-21 PROCEDURE — 82306 VITAMIN D 25 HYDROXY: CPT

## 2022-10-21 PROCEDURE — 83036 HEMOGLOBIN GLYCOSYLATED A1C: CPT

## 2022-10-21 PROCEDURE — 36415 COLL VENOUS BLD VENIPUNCTURE: CPT

## 2022-10-21 PROCEDURE — 83550 IRON BINDING TEST: CPT

## 2022-10-21 PROCEDURE — 82728 ASSAY OF FERRITIN: CPT

## 2022-10-21 PROCEDURE — 80076 HEPATIC FUNCTION PANEL: CPT

## 2022-10-21 PROCEDURE — 84443 ASSAY THYROID STIM HORMONE: CPT

## 2022-10-21 PROCEDURE — 83540 ASSAY OF IRON: CPT

## 2022-10-21 PROCEDURE — 85025 COMPLETE CBC W/AUTO DIFF WBC: CPT

## 2022-10-21 NOTE — TELEPHONE ENCOUNTER
Spoke with [address] lab. She stated they saw the add-on order and it is in progress.

## 2022-10-24 ENCOUNTER — TELEPHONE (OUTPATIENT)
Dept: INTERNAL MEDICINE CLINIC | Facility: CLINIC | Age: 67
End: 2022-10-24

## 2022-10-24 NOTE — TELEPHONE ENCOUNTER
Lab results have not been reviewed as yet. Patient will be contacted after provider reviews. Closing encounter.

## 2022-10-31 ENCOUNTER — PATIENT MESSAGE (OUTPATIENT)
Dept: INTERNAL MEDICINE CLINIC | Facility: CLINIC | Age: 67
End: 2022-10-31

## 2022-11-08 ENCOUNTER — OFFICE VISIT (OUTPATIENT)
Dept: INTERNAL MEDICINE CLINIC | Facility: CLINIC | Age: 67
End: 2022-11-08
Payer: MEDICARE

## 2022-11-08 VITALS
HEART RATE: 73 BPM | RESPIRATION RATE: 16 BRPM | OXYGEN SATURATION: 99 % | DIASTOLIC BLOOD PRESSURE: 90 MMHG | SYSTOLIC BLOOD PRESSURE: 140 MMHG | WEIGHT: 253.81 LBS | HEIGHT: 65.75 IN | TEMPERATURE: 98 F | BODY MASS INDEX: 41.28 KG/M2

## 2022-11-08 DIAGNOSIS — E66.01 MORBID OBESITY WITH BMI OF 40.0-44.9, ADULT (HCC): ICD-10-CM

## 2022-11-08 DIAGNOSIS — M17.0 BILATERAL PRIMARY OSTEOARTHRITIS OF KNEE: ICD-10-CM

## 2022-11-08 DIAGNOSIS — R73.03 PREDIABETES: ICD-10-CM

## 2022-11-08 DIAGNOSIS — M79.672 LEFT FOOT PAIN: ICD-10-CM

## 2022-11-08 DIAGNOSIS — E61.1 IRON DEFICIENCY: ICD-10-CM

## 2022-11-08 DIAGNOSIS — I10 ESSENTIAL HYPERTENSION: Primary | ICD-10-CM

## 2022-11-08 PROCEDURE — 99214 OFFICE O/P EST MOD 30 MIN: CPT | Performed by: PHYSICIAN ASSISTANT

## 2022-11-08 RX ORDER — AMLODIPINE BESYLATE 5 MG/1
10 TABLET ORAL NIGHTLY
Refills: 0 | COMMUNITY
Start: 2022-11-08

## 2022-11-08 NOTE — PATIENT INSTRUCTIONS
See Dr. Ron Fox or Emre Coyle for low iron. Low impact exercise (such as biking) -- start with 10 minutes every day.     Blood pressure:  - take amlodipine 10 mg every evening (no morning dose)  - send [place] your home BP readings in one week    Pre-diabetes:  - low carb diet and increased exercise

## 2022-12-05 RX ORDER — MOMETASONE FUROATE 1 MG/G
CREAM TOPICAL
Qty: 15 G | Refills: 0 | Status: SHIPPED | OUTPATIENT
Start: 2022-12-05

## 2022-12-05 NOTE — TELEPHONE ENCOUNTER
Called patient and he verified he is still using     No Protocol     Requesting: Mometasone Furoate 0.1 % External Cream     LOV: 11/8/22   RTC: 1 month for wellness   Filled: 11/9/20 #15g 1 refill   Recent Labs: 10/21/22     Upcoming OV: none scheduled

## 2022-12-06 ENCOUNTER — TELEPHONE (OUTPATIENT)
Dept: ORTHOPEDICS CLINIC | Facility: CLINIC | Age: 67
End: 2022-12-06

## 2022-12-06 NOTE — TELEPHONE ENCOUNTER
Imaging was completed for this patient for MADINA Knees, but it needs to be reviewed to see if additional imaging is needed. If so, please enter the appropriate RX and let the patient know to come in before their appointment to complete the additional imaging. Thank you!     Future Appointments   Date Time Provider Lelo Richards   12/14/2022 11:30 AM Elaina Boyd PA-C [facility]

## 2022-12-12 ENCOUNTER — HOSPITAL ENCOUNTER (OUTPATIENT)
Dept: GENERAL RADIOLOGY | Age: 67
Discharge: HOME | End: 2022-12-12
Attending: PHYSICIAN ASSISTANT
Payer: MEDICARE

## 2022-12-12 DIAGNOSIS — M79.672 LEFT FOOT PAIN: ICD-10-CM

## 2022-12-12 PROCEDURE — 73630 X-RAY EXAM OF FOOT: CPT | Performed by: PHYSICIAN ASSISTANT

## 2022-12-14 ENCOUNTER — OFFICE VISIT (OUTPATIENT)
Dept: ORTHOPEDICS CLINIC | Facility: CLINIC | Age: 67
End: 2022-12-14
Payer: MEDICARE

## 2022-12-14 VITALS — OXYGEN SATURATION: 98 % | HEIGHT: 60.75 IN | WEIGHT: 253 LBS | HEART RATE: 77 BPM | BODY MASS INDEX: 48.39 KG/M2

## 2022-12-14 DIAGNOSIS — M17.0 PRIMARY OSTEOARTHRITIS OF BOTH KNEES: Primary | ICD-10-CM

## 2022-12-14 PROCEDURE — 20610 DRAIN/INJ JOINT/BURSA W/O US: CPT | Performed by: PHYSICIAN ASSISTANT

## 2022-12-14 RX ORDER — TRIAMCINOLONE ACETONIDE 40 MG/ML
80 INJECTION, SUSPENSION INTRA-ARTICULAR; INTRAMUSCULAR ONCE
Status: COMPLETED | OUTPATIENT
Start: 2022-12-14 | End: 2022-12-14

## 2022-12-14 RX ADMIN — TRIAMCINOLONE ACETONIDE 80 MG: 40 INJECTION, SUSPENSION INTRA-ARTICULAR; INTRAMUSCULAR at 12:03:00

## 2022-12-14 NOTE — PROCEDURES
Last injections performed about 4 months ago helped for about 2 months. Pain mostly along the medial joint line. Right knee seems to be worse than left knee. Also discussed viscosupplementation but the patient would like to stick with steroid injections. After informed consent, the patient's right and left knees were marked, locally anesthetized with skin refrigerant, prepped with topical antiseptic, and injected with a mixture of 1mL 40mg/mL Kenalog, 2mL 1% lidocaine and 2mL 0.5% marcaine through the inferolateral portal.  A band-aid was applied. The patient tolerated the procedure well.     Stephany Murray PA-C  [address] Orthopedic [facility]

## 2022-12-29 ENCOUNTER — HOSPITAL ENCOUNTER (OUTPATIENT)
Dept: CT IMAGING | Age: 67
Discharge: HOME | End: 2022-12-29
Attending: INTERNAL MEDICINE
Payer: MEDICARE

## 2022-12-29 DIAGNOSIS — R10.31 CHRONIC RLQ PAIN: ICD-10-CM

## 2022-12-29 DIAGNOSIS — G89.29 CHRONIC RLQ PAIN: ICD-10-CM

## 2022-12-29 LAB
CREAT BLD-MCNC: 1.2 MG/DL
GFR SERPLBLD BASED ON 1.73 SQ M-ARVRAT: 67 ML/MIN/1.73M2 (ref 60–?)

## 2022-12-29 PROCEDURE — 82565 ASSAY OF CREATININE: CPT

## 2022-12-29 PROCEDURE — 74177 CT ABD & PELVIS W/CONTRAST: CPT | Performed by: INTERNAL MEDICINE

## 2023-01-10 ENCOUNTER — LAB ENCOUNTER (OUTPATIENT)
Dept: LAB | Age: 68
End: 2023-01-10
Attending: FAMILY MEDICINE
Payer: MEDICARE

## 2023-01-10 DIAGNOSIS — D50.9 IRON DEFICIENCY ANEMIA, UNSPECIFIED IRON DEFICIENCY ANEMIA TYPE: ICD-10-CM

## 2023-01-10 LAB
BASOPHILS # BLD AUTO: 0.03 X10(3) UL (ref 0–0.2)
BASOPHILS NFR BLD AUTO: 0.3 %
DEPRECATED HBV CORE AB SER IA-ACNC: 24.5 NG/ML
EOSINOPHIL # BLD AUTO: 0.08 X10(3) UL (ref 0–0.7)
EOSINOPHIL NFR BLD AUTO: 0.8 %
ERYTHROCYTE [DISTWIDTH] IN BLOOD BY AUTOMATED COUNT: 14.7 %
HCT VFR BLD AUTO: 46.1 %
HGB BLD-MCNC: 14.9 G/DL
IMM GRANULOCYTES # BLD AUTO: 0.03 X10(3) UL (ref 0–1)
IMM GRANULOCYTES NFR BLD: 0.3 %
IRON SATN MFR SERPL: 28 %
IRON SERPL-MCNC: 131 UG/DL
LYMPHOCYTES # BLD AUTO: 3.17 X10(3) UL (ref 1–4)
LYMPHOCYTES NFR BLD AUTO: 31.1 %
MCH RBC QN AUTO: 27.8 PG (ref 26–34)
MCHC RBC AUTO-ENTMCNC: 32.3 G/DL (ref 31–37)
MCV RBC AUTO: 86 FL
MONOCYTES # BLD AUTO: 0.74 X10(3) UL (ref 0.1–1)
MONOCYTES NFR BLD AUTO: 7.3 %
NEUTROPHILS # BLD AUTO: 6.15 X10 (3) UL (ref 1.5–7.7)
NEUTROPHILS # BLD AUTO: 6.15 X10(3) UL (ref 1.5–7.7)
NEUTROPHILS NFR BLD AUTO: 60.2 %
PLATELET # BLD AUTO: 342 10(3)UL (ref 150–450)
RBC # BLD AUTO: 5.36 X10(6)UL
TIBC SERPL-MCNC: 474 UG/DL (ref 240–450)
TRANSFERRIN SERPL-MCNC: 318 MG/DL (ref 200–360)
WBC # BLD AUTO: 10.2 X10(3) UL (ref 4–11)

## 2023-01-10 PROCEDURE — 83550 IRON BINDING TEST: CPT

## 2023-01-10 PROCEDURE — 85025 COMPLETE CBC W/AUTO DIFF WBC: CPT

## 2023-01-10 PROCEDURE — 36415 COLL VENOUS BLD VENIPUNCTURE: CPT

## 2023-01-10 PROCEDURE — 83540 ASSAY OF IRON: CPT

## 2023-01-10 PROCEDURE — 82728 ASSAY OF FERRITIN: CPT

## 2023-01-12 PROBLEM — R93.3 ABNORMAL FINDINGS ON DIAGNOSTIC IMAGING OF DIGESTIVE SYSTEM: Status: ACTIVE | Noted: 2023-01-12

## 2023-03-14 ENCOUNTER — TELEMEDICINE (OUTPATIENT)
Dept: INTERNAL MEDICINE CLINIC | Facility: CLINIC | Age: 68
End: 2023-03-14
Payer: MEDICARE

## 2023-03-14 DIAGNOSIS — U07.1 COVID-19 VIRUS INFECTION: Primary | ICD-10-CM

## 2023-03-14 PROCEDURE — 99442 PHONE E/M BY PHYS 11-20 MIN: CPT | Performed by: PHYSICIAN ASSISTANT

## 2023-03-15 ENCOUNTER — TELEPHONE (OUTPATIENT)
Dept: INTERNAL MEDICINE CLINIC | Facility: CLINIC | Age: 68
End: 2023-03-15

## 2023-03-15 NOTE — TELEPHONE ENCOUNTER
Pt called requesting to speak to LL or nurse regarding to pain on ribs that has not improved. Pt is requesting a call back. Please advise.

## 2023-03-15 NOTE — TELEPHONE ENCOUNTER
Spoke with pt. Pt stated he has rib pain and bloating. He stated rib pain on both sides, stomach feels hard and bloated. He states when he sits, pain is at a 7, but when he stands or lays down, pain is a 3 or 4. Pt stated he had loose stools this morning. Tried hot shower and passing gas, both helped w/pain an bloating. Spoke with LL. She advised for pt to try tylenol for the pain. To keep bowels moving (avoid constipation), and to sit up straight instead of putting pressure on abdomen. Informed pt of recs. Stated if s/s don't improve can be evaluated in clinic next week. If s/s become severe, should proceed to ER.      Pt v/u

## 2023-04-13 ENCOUNTER — OFFICE VISIT (OUTPATIENT)
Dept: INTERNAL MEDICINE CLINIC | Facility: CLINIC | Age: 68
End: 2023-04-13
Payer: MEDICARE

## 2023-04-13 VITALS
BODY MASS INDEX: 41.67 KG/M2 | DIASTOLIC BLOOD PRESSURE: 78 MMHG | RESPIRATION RATE: 16 BRPM | SYSTOLIC BLOOD PRESSURE: 140 MMHG | HEART RATE: 86 BPM | OXYGEN SATURATION: 98 % | TEMPERATURE: 99 F | HEIGHT: 65.75 IN | WEIGHT: 256.19 LBS

## 2023-04-13 DIAGNOSIS — E61.1 IRON DEFICIENCY: ICD-10-CM

## 2023-04-13 DIAGNOSIS — E03.9 HYPOTHYROIDISM, UNSPECIFIED TYPE: ICD-10-CM

## 2023-04-13 DIAGNOSIS — R09.89 THROAT CLEARING: ICD-10-CM

## 2023-04-13 DIAGNOSIS — R22.32 SUBCUTANEOUS MASS OF LEFT UPPER EXTREMITY: ICD-10-CM

## 2023-04-13 DIAGNOSIS — E55.9 VITAMIN D DEFICIENCY: ICD-10-CM

## 2023-04-13 DIAGNOSIS — I10 BENIGN ESSENTIAL HYPERTENSION: Primary | ICD-10-CM

## 2023-04-13 PROCEDURE — 99214 OFFICE O/P EST MOD 30 MIN: CPT | Performed by: PHYSICIAN ASSISTANT

## 2023-04-13 RX ORDER — AMLODIPINE BESYLATE 10 MG/1
10 TABLET ORAL NIGHTLY
COMMUNITY
End: 2023-04-13

## 2023-04-13 RX ORDER — VALSARTAN 160 MG/1
160 TABLET ORAL NIGHTLY
Qty: 30 TABLET | Refills: 0 | Status: SHIPPED | OUTPATIENT
Start: 2023-04-13

## 2023-04-13 NOTE — PATIENT INSTRUCTIONS
Please use [employer] or call Genaro Villarreal at [phone] to set up the following tests:  - fasting blood work  - ultrasound of left arm    Blood pressure:  - stop amlodipine  - start valsartan 160 mg - 1 tablet nightly  - send [facility] [employer] with your home BP readings in 7-10 days    Throat clearing:  - continue famotidine  - start OTC Allegra - 1 tablet daily  - if Allegra doesn't help then please start OTC Flonase spray - 2 sprays in each nostril daily

## 2023-04-14 ENCOUNTER — LAB ENCOUNTER (OUTPATIENT)
Dept: LAB | Age: 68
End: 2023-04-14
Attending: PHYSICIAN ASSISTANT
Payer: MEDICARE

## 2023-04-14 ENCOUNTER — TELEPHONE (OUTPATIENT)
Dept: INTERNAL MEDICINE CLINIC | Facility: CLINIC | Age: 68
End: 2023-04-14

## 2023-04-14 DIAGNOSIS — E61.1 IRON DEFICIENCY: ICD-10-CM

## 2023-04-14 DIAGNOSIS — I10 BENIGN ESSENTIAL HYPERTENSION: ICD-10-CM

## 2023-04-14 DIAGNOSIS — R22.32 SUBCUTANEOUS MASS OF LEFT UPPER EXTREMITY: ICD-10-CM

## 2023-04-14 DIAGNOSIS — Z12.5 SCREENING FOR PROSTATE CANCER: ICD-10-CM

## 2023-04-14 DIAGNOSIS — R35.1 NOCTURIA: Primary | ICD-10-CM

## 2023-04-14 DIAGNOSIS — E55.9 VITAMIN D DEFICIENCY: ICD-10-CM

## 2023-04-14 DIAGNOSIS — E03.9 HYPOTHYROIDISM, UNSPECIFIED TYPE: ICD-10-CM

## 2023-04-14 LAB
ALBUMIN SERPL-MCNC: 3.7 G/DL (ref 3.4–5)
ALBUMIN/GLOB SERPL: 0.8 {RATIO} (ref 1–2)
ALP LIVER SERPL-CCNC: 88 U/L
ALT SERPL-CCNC: 23 U/L
ANION GAP SERPL CALC-SCNC: 1 MMOL/L (ref 0–18)
AST SERPL-CCNC: 16 U/L (ref 15–37)
BASOPHILS # BLD AUTO: 0.03 X10(3) UL (ref 0–0.2)
BASOPHILS NFR BLD AUTO: 0.3 %
BILIRUB SERPL-MCNC: 0.6 MG/DL (ref 0.1–2)
BUN BLD-MCNC: 14 MG/DL (ref 7–18)
CALCIUM BLD-MCNC: 9.2 MG/DL (ref 8.5–10.1)
CHLORIDE SERPL-SCNC: 107 MMOL/L (ref 98–112)
CHOLEST SERPL-MCNC: 185 MG/DL (ref ?–200)
CO2 SERPL-SCNC: 26 MMOL/L (ref 21–32)
COMPLEXED PSA SERPL-MCNC: 1.17 NG/ML (ref ?–4)
CREAT BLD-MCNC: 1.2 MG/DL
DEPRECATED HBV CORE AB SER IA-ACNC: 24.4 NG/ML
EOSINOPHIL # BLD AUTO: 0.22 X10(3) UL (ref 0–0.7)
EOSINOPHIL NFR BLD AUTO: 2.4 %
ERYTHROCYTE [DISTWIDTH] IN BLOOD BY AUTOMATED COUNT: 15.4 %
FASTING PATIENT LIPID ANSWER: YES
FASTING STATUS PATIENT QL REPORTED: YES
GFR SERPLBLD BASED ON 1.73 SQ M-ARVRAT: 67 ML/MIN/1.73M2 (ref 60–?)
GLOBULIN PLAS-MCNC: 4.4 G/DL (ref 2.8–4.4)
GLUCOSE BLD-MCNC: 110 MG/DL (ref 70–99)
HCT VFR BLD AUTO: 44.7 %
HDLC SERPL-MCNC: 40 MG/DL (ref 40–59)
HGB BLD-MCNC: 14.7 G/DL
IMM GRANULOCYTES # BLD AUTO: 0.03 X10(3) UL (ref 0–1)
IMM GRANULOCYTES NFR BLD: 0.3 %
IRON SATN MFR SERPL: 21 %
IRON SERPL-MCNC: 86 UG/DL
LDLC SERPL CALC-MCNC: 118 MG/DL (ref ?–100)
LYMPHOCYTES # BLD AUTO: 2.56 X10(3) UL (ref 1–4)
LYMPHOCYTES NFR BLD AUTO: 27.7 %
MAGNESIUM SERPL-MCNC: 2.1 MG/DL (ref 1.6–2.6)
MCH RBC QN AUTO: 27.8 PG (ref 26–34)
MCHC RBC AUTO-ENTMCNC: 32.9 G/DL (ref 31–37)
MCV RBC AUTO: 84.5 FL
MONOCYTES # BLD AUTO: 0.75 X10(3) UL (ref 0.1–1)
MONOCYTES NFR BLD AUTO: 8.1 %
NEUTROPHILS # BLD AUTO: 5.65 X10 (3) UL (ref 1.5–7.7)
NEUTROPHILS # BLD AUTO: 5.65 X10(3) UL (ref 1.5–7.7)
NEUTROPHILS NFR BLD AUTO: 61.2 %
NONHDLC SERPL-MCNC: 145 MG/DL (ref ?–130)
OSMOLALITY SERPL CALC.SUM OF ELEC: 279 MOSM/KG (ref 275–295)
PLATELET # BLD AUTO: 341 10(3)UL (ref 150–450)
POTASSIUM SERPL-SCNC: 4 MMOL/L (ref 3.5–5.1)
PROT SERPL-MCNC: 8.1 G/DL (ref 6.4–8.2)
RBC # BLD AUTO: 5.29 X10(6)UL
SODIUM SERPL-SCNC: 134 MMOL/L (ref 136–145)
T4 FREE SERPL-MCNC: 1.2 NG/DL (ref 0.8–1.7)
TIBC SERPL-MCNC: 410 UG/DL (ref 240–450)
TRANSFERRIN SERPL-MCNC: 275 MG/DL (ref 200–360)
TRIGL SERPL-MCNC: 153 MG/DL (ref 30–149)
TSI SER-ACNC: 4.68 MIU/ML (ref 0.36–3.74)
URATE SERPL-MCNC: 7.7 MG/DL
VIT D+METAB SERPL-MCNC: 23.1 NG/ML (ref 30–100)
VLDLC SERPL CALC-MCNC: 27 MG/DL (ref 0–30)
WBC # BLD AUTO: 9.2 X10(3) UL (ref 4–11)

## 2023-04-14 PROCEDURE — 84443 ASSAY THYROID STIM HORMONE: CPT

## 2023-04-14 PROCEDURE — 83540 ASSAY OF IRON: CPT

## 2023-04-14 PROCEDURE — 83735 ASSAY OF MAGNESIUM: CPT

## 2023-04-14 PROCEDURE — 84550 ASSAY OF BLOOD/URIC ACID: CPT

## 2023-04-14 PROCEDURE — 80053 COMPREHEN METABOLIC PANEL: CPT

## 2023-04-14 PROCEDURE — 82728 ASSAY OF FERRITIN: CPT

## 2023-04-14 PROCEDURE — 85025 COMPLETE CBC W/AUTO DIFF WBC: CPT

## 2023-04-14 PROCEDURE — 82306 VITAMIN D 25 HYDROXY: CPT

## 2023-04-14 PROCEDURE — 83550 IRON BINDING TEST: CPT

## 2023-04-14 PROCEDURE — 84439 ASSAY OF FREE THYROXINE: CPT

## 2023-04-14 PROCEDURE — 80061 LIPID PANEL: CPT

## 2023-04-14 PROCEDURE — 36415 COLL VENOUS BLD VENIPUNCTURE: CPT

## 2023-04-14 NOTE — TELEPHONE ENCOUNTER
Spoke with pt. Pt stated he meant to mention at [facility] that he is having some urinary issues for sometime like nocturia and frquency during day. Pt would like PSA added to lab draw today. He spoke with the lab and they have enough blood and could add it. His last PSA was done in 2020. Pended PSA, please sign if agree, then I will call lab to have them add it. Thanks!

## 2023-04-14 NOTE — TELEPHONE ENCOUNTER
Spoke with Zaida Alvarez at [address] [place] [address] reference [facility]. She confirmed that they received new lab and it can be added on to today's specimen.

## 2023-04-14 NOTE — TELEPHONE ENCOUNTER
Patient is calling to request that we add PSA order to his lab orders. Patient states he just had them drawn and they are waiting to send it out.

## 2023-05-04 ENCOUNTER — TELEPHONE (OUTPATIENT)
Dept: INTERNAL MEDICINE CLINIC | Facility: CLINIC | Age: 68
End: 2023-05-04

## 2023-05-04 NOTE — TELEPHONE ENCOUNTER
[facility] - [facility] tomorrow at 9:30AM, TY! Spoke to pt - between the coughing episodes and PND after eating, he got no sleep at all last night. Pt had COVID-19 around the 3rd week of March and his cough never fully resolved. Informed pt this may still be long-term symptoms from that illness. Pt also has exposure to his grandchildren, one of which does have strep throat. Pt has taken Mucinex, Coricidin, Allegra, Claritin and he just cannot seem to kick this thing. Pt stated the valsartan LL prescribed 4/13/23 was causing some side effects, so he went back to the amlodipine he was taking previously. Recommended pt take a home COVID test to see if he is still testing positive - this information will help LS at [facility] tomorrow. Cautioned pt that some people test positive for around 6 weeks after their COVID illness. Provided education that if he is COVID positive, that does NOT mean he is still contagious. Pt v/u.    RN suggested pt speak with his regular pharmacist to see if he/she can recommend anything else OTC that can help pt stop coughing and get some sleep without raising his blood pressure. Pt grateful for the additional assistance.

## 2023-05-04 NOTE — TELEPHONE ENCOUNTER
Future Appointments   Date Time Provider Lelo Mascorroi   5/5/2023  9:30 AM Clara White MD [facility] [place]   5/11/2023 11:00 AM [facility] RM [facility] [employer] stated he is having sinus issues and it's bothering him a lot, he can't even get good sleep. Pt is aware he has an appt for tomorrow with LS. Please advise.

## 2023-05-05 ENCOUNTER — OFFICE VISIT (OUTPATIENT)
Dept: INTERNAL MEDICINE CLINIC | Facility: CLINIC | Age: 68
End: 2023-05-05
Payer: MEDICARE

## 2023-05-05 VITALS
SYSTOLIC BLOOD PRESSURE: 128 MMHG | BODY MASS INDEX: 41.31 KG/M2 | HEART RATE: 76 BPM | HEIGHT: 65.75 IN | RESPIRATION RATE: 16 BRPM | OXYGEN SATURATION: 97 % | DIASTOLIC BLOOD PRESSURE: 74 MMHG | TEMPERATURE: 98 F | WEIGHT: 254 LBS

## 2023-05-05 DIAGNOSIS — R73.03 PRE-DIABETES: ICD-10-CM

## 2023-05-05 DIAGNOSIS — N39.41 URGE INCONTINENCE: ICD-10-CM

## 2023-05-05 DIAGNOSIS — I25.10 CORONARY ARTERY DISEASE INVOLVING NATIVE CORONARY ARTERY OF NATIVE HEART WITHOUT ANGINA PECTORIS: ICD-10-CM

## 2023-05-05 DIAGNOSIS — E61.1 IRON DEFICIENCY: ICD-10-CM

## 2023-05-05 DIAGNOSIS — R05.3 CHRONIC COUGH: ICD-10-CM

## 2023-05-05 DIAGNOSIS — Z00.00 WELLNESS EXAMINATION: Primary | ICD-10-CM

## 2023-05-05 DIAGNOSIS — E78.2 MIXED HYPERLIPIDEMIA: ICD-10-CM

## 2023-05-05 DIAGNOSIS — I10 BENIGN ESSENTIAL HYPERTENSION: ICD-10-CM

## 2023-05-05 DIAGNOSIS — I73.9 CLAUDICATION (HCC): ICD-10-CM

## 2023-05-05 DIAGNOSIS — E55.9 HYPOVITAMINOSIS D: ICD-10-CM

## 2023-05-05 DIAGNOSIS — E53.8 VITAMIN B 12 DEFICIENCY: ICD-10-CM

## 2023-05-05 DIAGNOSIS — E66.01 MORBID OBESITY WITH BMI OF 40.0-44.9, ADULT (HCC): ICD-10-CM

## 2023-05-05 DIAGNOSIS — R23.3 EASY BRUISING: ICD-10-CM

## 2023-05-05 DIAGNOSIS — E03.9 HYPOTHYROIDISM, UNSPECIFIED TYPE: ICD-10-CM

## 2023-05-05 RX ORDER — AMLODIPINE BESYLATE 10 MG/1
10 TABLET ORAL DAILY
COMMUNITY

## 2023-05-05 RX ORDER — FLUTICASONE PROPIONATE 50 MCG
2 SPRAY, SUSPENSION (ML) NASAL DAILY
Qty: 1 EACH | Refills: 1 | Status: SHIPPED | OUTPATIENT
Start: 2023-05-05 | End: 2024-04-29

## 2023-05-05 NOTE — PATIENT INSTRUCTIONS
Start fluticasone nasal spray 2 sprays each nostril once a day.  Take for 3 weeks and then re-assess symptoms     Get over the counter saline nasal spray     Check blood work prior to next appointment in one month    Continue increased iron in diet and consider iron tablet    Continue vitamin D supplement    Call to schedule ankle brachial index through central scheduling [phone]

## 2023-05-08 ENCOUNTER — HOSPITAL ENCOUNTER (OUTPATIENT)
Dept: ULTRASOUND IMAGING | Facility: HOSPITAL | Age: 68
Discharge: HOME | End: 2023-05-08
Attending: INTERNAL MEDICINE
Payer: MEDICARE

## 2023-05-08 DIAGNOSIS — I73.9 CLAUDICATION (HCC): ICD-10-CM

## 2023-05-08 PROCEDURE — 93922 UPR/L XTREMITY ART 2 LEVELS: CPT | Performed by: INTERNAL MEDICINE

## 2023-05-11 ENCOUNTER — HOSPITAL ENCOUNTER (OUTPATIENT)
Dept: ULTRASOUND IMAGING | Facility: HOSPITAL | Age: 68
Discharge: HOME | End: 2023-05-11
Attending: PHYSICIAN ASSISTANT
Payer: MEDICARE

## 2023-05-11 DIAGNOSIS — R22.32 SUBCUTANEOUS MASS OF LEFT UPPER EXTREMITY: ICD-10-CM

## 2023-05-11 PROCEDURE — 76882 US LMTD JT/FCL EVL NVASC XTR: CPT | Performed by: PHYSICIAN ASSISTANT

## 2023-06-07 ENCOUNTER — OFFICE VISIT (OUTPATIENT)
Dept: INTERNAL MEDICINE CLINIC | Facility: CLINIC | Age: 68
End: 2023-06-07
Payer: MEDICARE

## 2023-06-07 ENCOUNTER — LAB ENCOUNTER (OUTPATIENT)
Dept: LAB | Age: 68
End: 2023-06-07
Attending: INTERNAL MEDICINE
Payer: MEDICARE

## 2023-06-07 ENCOUNTER — TELEPHONE (OUTPATIENT)
Dept: INTERNAL MEDICINE CLINIC | Facility: CLINIC | Age: 68
End: 2023-06-07

## 2023-06-07 VITALS
SYSTOLIC BLOOD PRESSURE: 134 MMHG | WEIGHT: 255.38 LBS | BODY MASS INDEX: 41.54 KG/M2 | HEART RATE: 77 BPM | TEMPERATURE: 98 F | DIASTOLIC BLOOD PRESSURE: 80 MMHG | OXYGEN SATURATION: 97 % | HEIGHT: 65.75 IN

## 2023-06-07 DIAGNOSIS — I10 BENIGN ESSENTIAL HYPERTENSION: ICD-10-CM

## 2023-06-07 DIAGNOSIS — M54.41 ACUTE BILATERAL LOW BACK PAIN WITH BILATERAL SCIATICA: Primary | ICD-10-CM

## 2023-06-07 DIAGNOSIS — E03.9 HYPOTHYROIDISM, UNSPECIFIED TYPE: ICD-10-CM

## 2023-06-07 DIAGNOSIS — E53.8 VITAMIN B 12 DEFICIENCY: ICD-10-CM

## 2023-06-07 DIAGNOSIS — R73.03 PRE-DIABETES: ICD-10-CM

## 2023-06-07 DIAGNOSIS — M54.42 ACUTE BILATERAL LOW BACK PAIN WITH BILATERAL SCIATICA: Primary | ICD-10-CM

## 2023-06-07 LAB
EST. AVERAGE GLUCOSE BLD GHB EST-MCNC: 126 MG/DL (ref 68–126)
HBA1C MFR BLD: 6 % (ref ?–5.7)
TSI SER-ACNC: 2.48 MIU/ML (ref 0.36–3.74)
VIT B12 SERPL-MCNC: 243 PG/ML (ref 193–986)

## 2023-06-07 PROCEDURE — 82607 VITAMIN B-12: CPT

## 2023-06-07 PROCEDURE — 99213 OFFICE O/P EST LOW 20 MIN: CPT | Performed by: PHYSICIAN ASSISTANT

## 2023-06-07 PROCEDURE — 36415 COLL VENOUS BLD VENIPUNCTURE: CPT

## 2023-06-07 PROCEDURE — 84443 ASSAY THYROID STIM HORMONE: CPT

## 2023-06-07 PROCEDURE — 83036 HEMOGLOBIN GLYCOSYLATED A1C: CPT

## 2023-06-07 RX ORDER — MELOXICAM 15 MG/1
15 TABLET ORAL DAILY PRN
Qty: 30 TABLET | Refills: 0 | Status: SHIPPED | OUTPATIENT
Start: 2023-06-07

## 2023-06-07 RX ORDER — CYCLOBENZAPRINE HCL 5 MG
5 TABLET ORAL NIGHTLY PRN
Qty: 10 TABLET | Refills: 0 | Status: SHIPPED | OUTPATIENT
Start: 2023-06-07

## 2023-06-07 RX ORDER — CYCLOBENZAPRINE HYDROCHLORIDE 7.5 MG/1
7.5 TABLET, FILM COATED ORAL NIGHTLY PRN
Qty: 10 TABLET | Refills: 0 | Status: SHIPPED | OUTPATIENT
Start: 2023-06-07 | End: 2023-06-07 | Stop reason: CLARIF

## 2023-06-07 NOTE — PATIENT INSTRUCTIONS
Blood work today. Schedule MRI of lumbar spine at [facility].     Back / leg pain:  - start meloxicam (anti-inflammatory) -- 1 tablet daily with food x 5 days, then may take 1 tab daily with food as needed    -- do not combine this with advil, motrin, ibuprofen, aleve, naproxen, excedrin, aspirin  - ok to take tylenol  - start cyclobenzaprine (muscle relaxer) -  1 tablet nightly (may cause drowsiness, somnolence)

## 2023-06-07 NOTE — TELEPHONE ENCOUNTER
[address]      Pharmacy doesn't carry the 7.5 MG. Would you like to switch to 5 MG? 10 MG? Or 1/2 of 5 MG? Please advise.     Cyclobenzaprine HCl 7.5 MG Oral Tab 10 tablet

## 2023-06-13 ENCOUNTER — TELEPHONE (OUTPATIENT)
Dept: ORTHOPEDICS CLINIC | Facility: CLINIC | Age: 68
End: 2023-06-13

## 2023-06-13 DIAGNOSIS — M54.40 LOW BACK PAIN WITH SCIATICA, SCIATICA LATERALITY UNSPECIFIED, UNSPECIFIED BACK PAIN LATERALITY, UNSPECIFIED CHRONICITY: Primary | ICD-10-CM

## 2023-06-13 NOTE — TELEPHONE ENCOUNTER
Future Appointments   Date Time Provider Lelo Richards   6/15/2023  1:20 PM Valentina Ochoa MD [facility]       This patient is coming for Lower back sciatica problem. No prior imaging done yet. Please advise if views are needed for this appt. Thanks.       Patient can be reached at [phone]

## 2023-06-14 NOTE — TELEPHONE ENCOUNTER
Patient called and clarified why X-Rays are still needed. He verbalized understanding and states he will get them before his appointment.

## 2023-06-14 NOTE — TELEPHONE ENCOUNTER
Spoke to patient, he is wanting to know if imaging is needed because he has had an MRI done and planning to bring the disc to his appointment.

## 2023-06-15 ENCOUNTER — HOSPITAL ENCOUNTER (OUTPATIENT)
Dept: GENERAL RADIOLOGY | Age: 68
Discharge: HOME | End: 2023-06-15
Attending: STUDENT IN AN ORGANIZED HEALTH CARE EDUCATION/TRAINING PROGRAM
Payer: MEDICARE

## 2023-06-15 ENCOUNTER — OFFICE VISIT (OUTPATIENT)
Dept: ORTHOPEDICS CLINIC | Facility: CLINIC | Age: 68
End: 2023-06-15
Payer: MEDICARE

## 2023-06-15 DIAGNOSIS — M43.16 SPONDYLOLISTHESIS AT L4-L5 LEVEL: Primary | ICD-10-CM

## 2023-06-15 DIAGNOSIS — M54.40 LOW BACK PAIN WITH SCIATICA, SCIATICA LATERALITY UNSPECIFIED, UNSPECIFIED BACK PAIN LATERALITY, UNSPECIFIED CHRONICITY: ICD-10-CM

## 2023-06-15 PROCEDURE — 1125F AMNT PAIN NOTED PAIN PRSNT: CPT | Performed by: STUDENT IN AN ORGANIZED HEALTH CARE EDUCATION/TRAINING PROGRAM

## 2023-06-15 PROCEDURE — 99214 OFFICE O/P EST MOD 30 MIN: CPT | Performed by: STUDENT IN AN ORGANIZED HEALTH CARE EDUCATION/TRAINING PROGRAM

## 2023-06-15 PROCEDURE — 72100 X-RAY EXAM L-S SPINE 2/3 VWS: CPT | Performed by: STUDENT IN AN ORGANIZED HEALTH CARE EDUCATION/TRAINING PROGRAM

## 2023-06-15 RX ORDER — METHYLPREDNISOLONE 4 MG/1
TABLET ORAL
Qty: 21 TABLET | Refills: 0 | Status: SHIPPED | OUTPATIENT
Start: 2023-06-15

## 2023-06-19 ENCOUNTER — TELEPHONE (OUTPATIENT)
Dept: ORTHOPEDICS CLINIC | Facility: CLINIC | Age: 68
End: 2023-06-19

## 2023-06-19 NOTE — TELEPHONE ENCOUNTER
Rep from [facility] is calling. Stated that patient's insurance is out of network for their services and will need new referral for Home Health. Please advise. No future appointments.

## 2023-06-20 ENCOUNTER — TELEPHONE (OUTPATIENT)
Dept: ORTHOPEDICS CLINIC | Facility: CLINIC | Age: 68
End: 2023-06-20

## 2023-06-20 RX ORDER — MELOXICAM 15 MG/1
15 TABLET ORAL DAILY PRN
Qty: 30 TABLET | Refills: 0 | Status: SHIPPED | OUTPATIENT
Start: 2023-06-20

## 2023-06-20 NOTE — TELEPHONE ENCOUNTER
Patient called and states that the recent Rx that was prescribed by Dr. Bandar Hicks he only has 1 left, so he asked his PCP to get Meloxicam, would this be okay to take for his pain. Another this is that if he can get an in-home therapy instead because he will not be able to drive and that no one can drive him there as well Please advise. Thanks.     Patient can be reached at [phone]

## 2023-06-26 ENCOUNTER — OFFICE VISIT (OUTPATIENT)
Dept: PHYSICAL THERAPY | Age: 68
End: 2023-06-26
Attending: STUDENT IN AN ORGANIZED HEALTH CARE EDUCATION/TRAINING PROGRAM
Payer: MEDICARE

## 2023-06-26 DIAGNOSIS — M43.16 SPONDYLOLISTHESIS AT L4-L5 LEVEL: Primary | ICD-10-CM

## 2023-06-26 PROCEDURE — 97140 MANUAL THERAPY 1/> REGIONS: CPT

## 2023-06-26 PROCEDURE — 97162 PT EVAL MOD COMPLEX 30 MIN: CPT

## 2023-06-29 ENCOUNTER — OFFICE VISIT (OUTPATIENT)
Dept: PHYSICAL THERAPY | Age: 68
End: 2023-06-29
Attending: STUDENT IN AN ORGANIZED HEALTH CARE EDUCATION/TRAINING PROGRAM
Payer: MEDICARE

## 2023-06-29 PROCEDURE — 97110 THERAPEUTIC EXERCISES: CPT

## 2023-06-29 PROCEDURE — 97140 MANUAL THERAPY 1/> REGIONS: CPT

## 2023-07-03 ENCOUNTER — APPOINTMENT (OUTPATIENT)
Dept: PHYSICAL THERAPY | Age: 68
End: 2023-07-03
Attending: STUDENT IN AN ORGANIZED HEALTH CARE EDUCATION/TRAINING PROGRAM
Payer: MEDICARE

## 2023-07-03 ENCOUNTER — TELEPHONE (OUTPATIENT)
Dept: PHYSICAL THERAPY | Age: 68
End: 2023-07-03

## 2023-07-03 PROCEDURE — 97110 THERAPEUTIC EXERCISES: CPT

## 2023-07-05 ENCOUNTER — OFFICE VISIT (OUTPATIENT)
Dept: PHYSICAL THERAPY | Age: 68
End: 2023-07-05
Attending: STUDENT IN AN ORGANIZED HEALTH CARE EDUCATION/TRAINING PROGRAM
Payer: MEDICARE

## 2023-07-05 PROCEDURE — 97110 THERAPEUTIC EXERCISES: CPT

## 2023-07-05 NOTE — PROGRESS NOTES
Diagnosis:   Spondylolisthesis at L4-L5 level (M43.16)      Referring Provider: Anant Villarreal  Date of Evaluation:    23    Precautions:  None Next MD visit:   none scheduled  Date of Surgery: n/a   Insurance Primary/Secondary: MEDICARE / Siomara Mccullough INS     # Auth Visits: n/a            Subjective: \" my lower back and B knees continue to hurt constantly when I walk . I am constantly out breath with I walk \". Pain: 6-7/10 ( in lower back ), R/L knees 6-7/10       Objective: See flowsheet for details      Assessment: continued with stabilization ex's for lower abdominals and hip mm and advanced hip strength ex's to standing position to further progress trunk stabilization with gait . Instructed patient to start ambulating with RW  for improved postural control in order to  strain on lower back , B hips and B knees and instructed patient on correct gait mechanics to ensure proper form . Patient returned correct demo of gait mechanics . Goals:   (to be met in 10 visits)   Pt will demonstrate good understanding of proper posture and body mechanics to decrease pain and improve spinal safety   Pt will improve lumbar spine AROM flexion to [facility] without pain to allow increase ease with bending forward to don shoes   Pt will report improved symptom centralization and absence of radicular symptoms for 3 consecutive days to improve function with ADL   Pt will have decreased paraspinal mm tension to tolerate standing >15 minutes for work and home activities   Pt will demonstrate improved core strength to be able to perform prolonged walking with <2/10 pain   Pt will be independent and compliant with comprehensive HEP to maintain progress achieved in PT     Plan: Will cont to progress core strengthening exercises as tolerated. Date: 2023  TX#: 2/10 Date:   23              TX#: 3/10 Date:    23             TX#: 4/10 Date:                 TX#:  Date:    Tx#: 6/   Therapeutic Exercises:  25 mins Therapeutic Ex :  15 min  Therapeutic Ex :  20 min      NuStep (L3)  5 mins NU Step ( L 4 ) x 6 min  NU step x 7 min , ( level 4 )      Slant Board Stretch  3x30 secs Slant board stretch :  5 reps with 20 sec hold  Supine :  LTR x 5 reps each with 10 sec hold each   Bridging ( 1/2 range ) x 5 reps with 3 sec hold   TrA activation with :  Adductors activation x 5 reps with 5 sec hold ( with yellow ball )   Abductors activation with fitness [place] x 5 with 5 sec hold   Alt marches x 5 reps each   Alt TKE with knees on bolster x 5 reps each with 1.5 lbs   R/L QS x 10 reps each      Standing Lumbar Ext  10x5 secs Standing :  Lumbar extension x 10 reps with 5 sec hold  Standing : Alt hip abd x 10  Alt hip ext x 10  Alt marches x 10  Sideways steps in // bars x 10 steps x 1 round     Hamstring Stretch  2x30 secs ea Supine :  R/L HS stretch x 5 reps with 10 sec hold       LTRs   10x5 secs ea Supine :  LTR R/L x 5 reps each side with 5 sec hold       Ball Squeezes  15x5 secs       Hip Abd w/ Ring  15x5 secs              Manual Therapy:  15 mins  STM to the B lumbar paraspinals and upper glutes         HEP :  ( 07/05/2023 ) : supine : TrA activation , B QS . Standing : alt marches , sideways steps ( handout provided )  Charges:    Therapeutic Ex x 3     Total Timed Treatment: 40 min     Total Treatment Time: 45 min

## 2023-07-10 ENCOUNTER — OFFICE VISIT (OUTPATIENT)
Dept: PHYSICAL THERAPY | Age: 68
End: 2023-07-10
Attending: STUDENT IN AN ORGANIZED HEALTH CARE EDUCATION/TRAINING PROGRAM
Payer: MEDICARE

## 2023-07-10 PROCEDURE — 97110 THERAPEUTIC EXERCISES: CPT

## 2023-07-10 NOTE — PROGRESS NOTES
Diagnosis:   Spondylolisthesis at L4-L5 level (M43.16)      Referring Provider: Jessie Jacob  Date of Evaluation:    6/25/23    Precautions:  None Next MD visit:   none scheduled  Date of Surgery: n/a   Insurance Primary/Secondary: Genny Goodman / Toribio Taylor [employer]     # Auth Visits: n/a            Subjective: \" I try to be more active during the day but my lower back and B knees hurt me a lot \". Pain: 6-7/10 ( in lower back ), R/L knees 6-7/10       Objective: See flowsheet for details      Assessment: continued with stabilization ex's for lower abdominals and hip mm and increased reps on all hip strength ex's in standing position to further promote improved stability with gait . Patient demonstrates improved tolerance for standing ex's since last visit . Goals:   (to be met in 10 visits)   Pt will demonstrate good understanding of proper posture and body mechanics to decrease pain and improve spinal safety   Pt will improve lumbar spine AROM flexion to [facility] without pain to allow increase ease with bending forward to don shoes   Pt will report improved symptom centralization and absence of radicular symptoms for 3 consecutive days to improve function with ADL   Pt will have decreased paraspinal mm tension to tolerate standing >15 minutes for work and home activities   Pt will demonstrate improved core strength to be able to perform prolonged walking with <2/10 pain   Pt will be independent and compliant with comprehensive HEP to maintain progress achieved in PT     Plan: Will cont to progress core strengthening exercises as tolerated. Date: 6/29/2023  TX#: 2/10 Date:   7/03/23              TX#: 3/10 Date:    7/05/23             TX#: 4/10 Date: 07/10/23            TX#: 5/10 Date:    Tx#: 6/   Therapeutic Exercises:  25 mins Therapeutic Ex :  15 min  Therapeutic Ex :  20 min  Therapeutic Ex :  40 min     NuStep (L3)  5 mins NU Step ( L 4 ) x 6 min  NU step x 7 min , ( level 4 )  NU step   7 min     Slant Board Stretch  3x30 secs Slant board stretch :  5 reps with 20 sec hold  Supine :  LTR x 5 reps each with 10 sec hold each   Bridging ( 1/2 range ) x 5 reps with 3 sec hold   TrA activation with :  Adductors activation x 5 reps with 5 sec hold ( with yellow ball )   Abductors activation with fitness [place] x 5 with 5 sec hold   Alt marches x 5 reps each   Alt TKE with knees on bolster x 5 reps each with 1.5 lbs   R/L QS x 10 reps each  Supine :  LTR R/L x 10 reps with 10 sec hold   R/L :  SKTC with belt assist x 10 reps each with 10 sec hold   R/L modified piriformis stretch x 5 reps each with 10 sec hold   R/L HS stretch with belt OP x 5 reps each with 10 sec hold   Adductor activation with yellow ball x 10 with 10 sec hold   Abductors activation with fitness [place] x 10 with 10 sec hold   TrA activation with marches with 1.5 lbs x 20   TrA activation with SLR x 10 reps with 10 sec hold   R/L quad sec x 20 reps each with 10 sec hold     Standing Lumbar Ext  10x5 secs Standing :  Lumbar extension x 10 reps with 5 sec hold  Standing : Alt hip abd x 10  Alt hip ext x 10  Alt marches x 10  Sideways steps in // bars x 10 steps x 1 round Standing :  Lumbar extension against fulcrum x 10 reps with 5 sec hold   B gastroc stretch on slant board x 5 with 20 sec hold   Alt hip abd with YTB x 10   Alt hip ext with YTB x 10  Sideways steps in // bars with YTB x 1 round     Hamstring Stretch  2x30 secs ea Supine :  R/L HS stretch x 5 reps with 10 sec hold       LTRs   10x5 secs ea Supine :  LTR R/L x 5 reps each side with 5 sec hold       Ball Squeezes  15x5 secs       Hip Abd w/ Ring  15x5 secs              Manual Therapy:  15 mins  STM to the B lumbar paraspinals and upper glutes         HEP :  ( 07/05/2023 ) : supine : TrA activation , B QS . Standing : alt marches , sideways steps ( handout provided )  Charges:    Therapeutic Ex x 3     Total Timed Treatment: 40 min     Total Treatment Time: 45 min

## 2023-07-12 ENCOUNTER — OFFICE VISIT (OUTPATIENT)
Dept: PHYSICAL THERAPY | Age: 68
End: 2023-07-12
Attending: STUDENT IN AN ORGANIZED HEALTH CARE EDUCATION/TRAINING PROGRAM
Payer: MEDICARE

## 2023-07-12 PROCEDURE — 97110 THERAPEUTIC EXERCISES: CPT

## 2023-07-12 NOTE — PROGRESS NOTES
Diagnosis:   Spondylolisthesis at L4-L5 level (M43.16)      Referring Provider: Michelle Gaston  Date of Evaluation:    6/25/23    Precautions:  None Next MD visit:   none scheduled  Date of Surgery: n/a   Insurance Primary/Secondary: Jimmy Cardenas / Liliya Raymond INS     # Auth Visits: n/a            Subjective: \" I was able to walk from parking lot to the gym on my own '      Pain: 6-7/10 ( in lower back ), R/L knees 6-7/10       Objective: See flowsheet for details      Assessment: modified hip flexibility ex's and trunk stability ex 's to seated position to accommodate patient c/o of \" feeling dizzy . Increase reps on all hip strength ex's in standing position to further progress trunk stability with improved posturing during gait . Patient richardson improved posture in standing with hip ex's and was able perform given ex's without report of increase in his symptoms . Goals:   (to be met in 10 visits)   Pt will demonstrate good understanding of proper posture and body mechanics to decrease pain and improve spinal safety   Pt will improve lumbar spine AROM flexion to [facility] without pain to allow increase ease with bending forward to don shoes   Pt will report improved symptom centralization and absence of radicular symptoms for 3 consecutive days to improve function with ADL   Pt will have decreased paraspinal mm tension to tolerate standing >15 minutes for work and home activities   Pt will demonstrate improved core strength to be able to perform prolonged walking with <2/10 pain   Pt will be independent and compliant with comprehensive HEP to maintain progress achieved in PT     Plan: Will cont to progress core strengthening exercises as tolerated.   Date: 6/29/2023  TX#: 2/10 Date:   7/03/23              TX#: 3/10 Date:    7/05/23             TX#: 4/10 Date: 07/10/23            TX#: 5/10 Date: 07/12/23  Tx#: 6/10   Therapeutic Exercises:  25 mins Therapeutic Ex :  15 min  Therapeutic Ex :  20 min  Therapeutic Ex :  40 min  THERAPEUTIC EX :  40 MIN    NuStep (L3)  5 mins NU Step ( L 4 ) x 6 min  NU step x 7 min , ( level 4 )  NU step   7 min  NU step x 7 min , level 4    Slant Board Stretch  3x30 secs Slant board stretch :  5 reps with 20 sec hold  Supine :  LTR x 5 reps each with 10 sec hold each   Bridging ( 1/2 range ) x 5 reps with 3 sec hold   TrA activation with :  Adductors activation x 5 reps with 5 sec hold ( with yellow ball )   Abductors activation with fitness [place] x 5 with 5 sec hold   Alt marches x 5 reps each   Alt TKE with knees on bolster x 5 reps each with 1.5 lbs   R/L QS x 10 reps each  Supine :  LTR R/L x 10 reps with 10 sec hold   R/L :  SKTC with belt assist x 10 reps each with 10 sec hold   R/L modified piriformis stretch x 5 reps each with 10 sec hold   R/L HS stretch with belt OP x 5 reps each with 10 sec hold   Adductor activation with yellow ball x 10 with 10 sec hold   Abductors activation with fitness [place] x 10 with 10 sec hold   TrA activation with marches with 1.5 lbs x 20   TrA activation with SLR x 10 reps with 10 sec hold   R/L quad sec x 20 reps each with 10 sec hold  Seated :  R/L :  modified piriformis stretch x 5 reps with 10 sec hold each   Quad set x 10 with 10 sec hold      TrA activation with :  Abductors  with fitness [place] x 20  Adductors with Yellow ball x 20  Marches x 20  Standing :  LB extension stretch over fulcrum x 20  Alt hip abduction with YTB x 20  Alt hip ext with YTB x 20  Sideways step in // bars  with YTB x 1 round      Standing Lumbar Ext  10x5 secs Standing :  Lumbar extension x 10 reps with 5 sec hold  Standing :   Alt hip abd x 10  Alt hip ext x 10  Alt marches x 10  Sideways steps in // bars x 10 steps x 1 round Standing :  Lumbar extension against fulcrum x 10 reps with 5 sec hold   B gastroc stretch on slant board x 5 with 20 sec hold   Alt hip abd with YTB x 10   Alt hip ext with YTB x 10  Sideways steps in // bars with YTB x 1 round     Hamstring Stretch  2x30 secs ea Supine :  R/L HS stretch x 5 reps with 10 sec hold       LTRs   10x5 secs ea Supine :  LTR R/L x 5 reps each side with 5 sec hold       Ball Squeezes  15x5 secs       Hip Abd w/ Ring  15x5 secs              Manual Therapy:  15 mins  STM to the B lumbar paraspinals and upper glutes         HEP :  ( 07/05/2023 ) : supine : TrA activation , B QS . Standing : alt marches , sideways steps ( handout provided )  Charges:    Therapeutic Ex x 3     Total Timed Treatment: 40 min     Total Treatment Time: 45 min

## 2023-07-14 ENCOUNTER — TELEPHONE (OUTPATIENT)
Dept: PHYSICAL THERAPY | Facility: HOSPITAL | Age: 68
End: 2023-07-14

## 2023-07-17 ENCOUNTER — OFFICE VISIT (OUTPATIENT)
Dept: PHYSICAL THERAPY | Age: 68
End: 2023-07-17
Attending: STUDENT IN AN ORGANIZED HEALTH CARE EDUCATION/TRAINING PROGRAM
Payer: MEDICARE

## 2023-07-17 PROCEDURE — 97110 THERAPEUTIC EXERCISES: CPT

## 2023-07-17 NOTE — PROGRESS NOTES
Diagnosis:   Spondylolisthesis at L4-L5 level (M43.16)      Referring Provider: Graciela Walls  Date of Evaluation:    6/25/23    Precautions:  None Next MD visit:   none scheduled  Date of Surgery: n/a   Insurance Primary/Secondary: MEDICARE / [employer]     # Auth Visits: n/a            Subjective: \" I am trying to be more active during the day and I am walking better however my left hip and HS feel painful after standing or waking for some time . \" \" I am arching my lower back backward many time during the day and my lower back fees better \". Pain: 6-7/10 ( in lower back ), R/L knees 6-7/10       Objective: See flowsheet for details      Assessment: patient was able to perform all given ex's without getting short of breath and needed only few rest brakes to complete given ex's . Patient presents with improved upright posturing with gait without a need for verbal cueing . Goals:   (to be met in 10 visits)   Pt will demonstrate good understanding of proper posture and body mechanics to decrease pain and improve spinal safety   Pt will improve lumbar spine AROM flexion to [facility] without pain to allow increase ease with bending forward to don shoes   Pt will report improved symptom centralization and absence of radicular symptoms for 3 consecutive days to improve function with ADL   Pt will have decreased paraspinal mm tension to tolerate standing >15 minutes for work and home activities   Pt will demonstrate improved core strength to be able to perform prolonged walking with <2/10 pain   Pt will be independent and compliant with comprehensive HEP to maintain progress achieved in PT     Plan: Will cont to progress core strengthening exercises as tolerated.   Date:   7/03/23              TX#: 3/10 Date:    7/05/23             TX#: 4/10 Date: 07/10/23            TX#: 5/10 Date: 07/12/23  Tx#: 6/10 Date : 07/17/23  TX # 7/10   Therapeutic Ex :  15 min  Therapeutic Ex :  20 min  Therapeutic Ex :  40 min  THERAPEUTIC EX :  40 MIN  THERAPEUTIC EX :  45 MIN    NU Step ( L 4 ) x 6 min  NU step x 7 min , ( level 4 )  NU step   7 min  NU step x 7 min , level 4  NU step x 8 min , level 4   Slant board stretch :  5 reps with 20 sec hold  Supine :  LTR x 5 reps each with 10 sec hold each   Bridging ( 1/2 range ) x 5 reps with 3 sec hold   TrA activation with :  Adductors activation x 5 reps with 5 sec hold ( with yellow ball )   Abductors activation with fitness [place] x 5 with 5 sec hold   Alt marches x 5 reps each   Alt TKE with knees on bolster x 5 reps each with 1.5 lbs   R/L QS x 10 reps each  Supine :  LTR R/L x 10 reps with 10 sec hold   R/L :  SKTC with belt assist x 10 reps each with 10 sec hold   R/L modified piriformis stretch x 5 reps each with 10 sec hold   R/L HS stretch with belt OP x 5 reps each with 10 sec hold   Adductor activation with yellow ball x 10 with 10 sec hold   Abductors activation with fitness [place] x 10 with 10 sec hold   TrA activation with marches with 1.5 lbs x 20   TrA activation with SLR x 10 reps with 10 sec hold   R/L quad sec x 20 reps each with 10 sec hold  Seated :  R/L :  modified piriformis stretch x 5 reps with 10 sec hold each   Quad set x 10 with 10 sec hold      TrA activation with :  Abductors  with fitness [place] x 20  Adductors with Yellow ball x 20  Marches x 20  Standing :  LB extension stretch over fulcrum x 20  Alt hip abduction with YTB x 20  Alt hip ext with YTB x 20  Sideways step in // bars  with YTB x 1 round    Seated :  R/L piriformis stretch x 5 reps each with 10 sec hold   R/L HS stretch x 5 reps with 10 sec hold   TrA activation with :    Adductors x 20  Abductors x 20  Marches x 20  R/L knee extension x 20  R/L hamstring curls with YTB x 20  Standing in // bars :  Gastroc stretch on slant board x 5 reps with 10 sec hold   B heel raises x 20  Lumbar extension x 20  Alt hip abd  with YTB x 20  Alt hip ext  with YTB  x 20  Sideways steps  with YTB x 2 rounds   Step up on 2 inch step x 10 reps each      Standing :  Lumbar extension x 10 reps with 5 sec hold  Standing : Alt hip abd x 10  Alt hip ext x 10  Alt marches x 10  Sideways steps in // bars x 10 steps x 1 round Standing :  Lumbar extension against fulcrum x 10 reps with 5 sec hold   B gastroc stretch on slant board x 5 with 20 sec hold   Alt hip abd with YTB x 10   Alt hip ext with YTB x 10  Sideways steps in // bars with YTB x 1 round      Supine :  R/L HS stretch x 5 reps with 10 sec hold        Supine :  LTR R/L x 5 reps each side with 5 sec hold                                      HEP :  ( 07/05/2023 ) : supine : TrA activation , B QS . Standing : alt marches , sideways steps ( handout provided )  Charges:    Therapeutic Ex x 3     Total Timed Treatment: 40 min     Total Treatment Time: 45 min

## 2023-07-19 ENCOUNTER — OFFICE VISIT (OUTPATIENT)
Dept: PHYSICAL THERAPY | Age: 68
End: 2023-07-19
Attending: STUDENT IN AN ORGANIZED HEALTH CARE EDUCATION/TRAINING PROGRAM
Payer: MEDICARE

## 2023-07-19 PROCEDURE — 97110 THERAPEUTIC EXERCISES: CPT

## 2023-07-19 NOTE — PROGRESS NOTES
Diagnosis:   Spondylolisthesis at L4-L5 level (M43.16)      Referring Provider: Jefrey Frankel  Date of Evaluation:    6/25/23    Precautions:  None Next MD visit:   none scheduled  Date of Surgery: n/a   Insurance Primary/Secondary: MEDICARE / [employer]     # Auth Visits: n/a            Subjective: \"My back feels fine today, but both of my hips are hurting. \"  Pt states that walking is sometimes better, but sometimes not. Pt reports that the pain in his hips and knees limit him. Pain: 5-6/10 in the B hips and knees      Objective: See flowsheet for details      Assessment: Secondary to increased knee pain, especially on the L, Pt demonstrated decreased tolerance to prolonged WB activity. Alternated seated and standing activities to allow for symptom relief in between WB exercises. Encouraged Pt to reach out to orthopedic MD to discuss knee and hip pain as he has received cortisone injections in the past.  Will progress WB strengthening and walking tolerance next session as tolerated. Goals:   (to be met in 10 visits)   Pt will demonstrate good understanding of proper posture and body mechanics to decrease pain and improve spinal safety   Pt will improve lumbar spine AROM flexion to [facility] without pain to allow increase ease with bending forward to don shoes   Pt will report improved symptom centralization and absence of radicular symptoms for 3 consecutive days to improve function with ADL   Pt will have decreased paraspinal mm tension to tolerate standing >15 minutes for work and home activities   Pt will demonstrate improved core strength to be able to perform prolonged walking with <2/10 pain   Pt will be independent and compliant with comprehensive HEP to maintain progress achieved in PT     Plan: Will cont to progress towards functional goals.   Date:   7/03/23              TX#: 3/10 Date:    7/05/23             TX#: 4/10 Date: 07/10/23            TX#: 5/10 Date: 07/12/23  Tx#: 6/10 Date : 07/17/23  TX # 7/10 Date : 07/19/23  TX # 8/10   Therapeutic Ex :  15 min  Therapeutic Ex :  20 min  Therapeutic Ex :  40 min  THERAPEUTIC EX :  40 MIN  THERAPEUTIC EX :  45 MIN  Therapeutic Exercises:  40 mins   NU Step ( L 4 ) x 6 min  NU step x 7 min , ( level 4 )  NU step   7 min  NU step x 7 min , level 4  NU step x 8 min , level 4 NuStep (L5)  7 mins   Slant board stretch :  5 reps with 20 sec hold  Supine :  LTR x 5 reps each with 10 sec hold each   Bridging ( 1/2 range ) x 5 reps with 3 sec hold   TrA activation with :  Adductors activation x 5 reps with 5 sec hold ( with yellow ball )   Abductors activation with fitness [place] x 5 with 5 sec hold   Alt marches x 5 reps each   Alt TKE with knees on bolster x 5 reps each with 1.5 lbs   R/L QS x 10 reps each  Supine :  LTR R/L x 10 reps with 10 sec hold   R/L :  SKTC with belt assist x 10 reps each with 10 sec hold   R/L modified piriformis stretch x 5 reps each with 10 sec hold   R/L HS stretch with belt OP x 5 reps each with 10 sec hold   Adductor activation with yellow ball x 10 with 10 sec hold   Abductors activation with fitness [place] x 10 with 10 sec hold   TrA activation with marches with 1.5 lbs x 20   TrA activation with SLR x 10 reps with 10 sec hold   R/L quad sec x 20 reps each with 10 sec hold  Seated :  R/L :  modified piriformis stretch x 5 reps with 10 sec hold each   Quad set x 10 with 10 sec hold      TrA activation with :  Abductors  with fitness [place] x 20  Adductors with Yellow ball x 20  Marches x 20  Standing :  LB extension stretch over fulcrum x 20  Alt hip abduction with YTB x 20  Alt hip ext with YTB x 20  Sideways step in // bars  with YTB x 1 round    Seated :  R/L piriformis stretch x 5 reps each with 10 sec hold   R/L HS stretch x 5 reps with 10 sec hold   TrA activation with :    Adductors x 20  Abductors x 20  Marches x 20  R/L knee extension x 20  R/L hamstring curls with YTB x 20  Standing in // bars :  Gastroc stretch on slant board x 5 reps with 10 sec hold   B heel raises x 20  Lumbar extension x 20  Alt hip abd  with YTB x 20  Alt hip ext  with YTB  x 20  Sideways steps  with YTB x 2 rounds   Step up on 2 inch step  x 10 reps each    Slant Board Stretch  3x30 secs  Heel Raises  2x10  Sidestepping in //bars  3 laps  Seated Ball Squeezes  20x5 secs  Seated Hip Abd w/ Ring  20x5 secs  Standing Lumbar Ext  2x10  Standing March  2x10  Standing Alt Hip Abd  2x10  Seated Hams Stretch w/ Stool  2 mins ea  Seated Piriformis Stretch  1 min ea   Standing :  Lumbar extension x 10 reps with 5 sec hold  Standing : Alt hip abd x 10  Alt hip ext x 10  Alt marches x 10  Sideways steps in // bars x 10 steps x 1 round Standing :  Lumbar extension against fulcrum x 10 reps with 5 sec hold   B gastroc stretch on slant board x 5 with 20 sec hold   Alt hip abd with YTB x 10   Alt hip ext with YTB x 10  Sideways steps in // bars with YTB x 1 round    Ice to L Knee  10 mins   Supine :  R/L HS stretch x 5 reps with 10 sec hold         Supine :  LTR R/L x 5 reps each side with 5 sec hold         HEP : ( 07/05/2023 ) : supine : TrA activation , B QS . Standing : alt marches , sideways steps ( handout provided )    Charges:    Therapeutic Ex x 3  Total Timed Treatment: 40 min     Total Treatment Time: 50 min

## 2023-07-21 ENCOUNTER — APPOINTMENT (OUTPATIENT)
Dept: PHYSICAL THERAPY | Age: 68
End: 2023-07-21
Attending: STUDENT IN AN ORGANIZED HEALTH CARE EDUCATION/TRAINING PROGRAM
Payer: MEDICARE

## 2023-07-22 DIAGNOSIS — E03.9 HYPOTHYROIDISM, UNSPECIFIED TYPE: Primary | ICD-10-CM

## 2023-07-24 ENCOUNTER — OFFICE VISIT (OUTPATIENT)
Dept: ORTHOPEDICS CLINIC | Facility: CLINIC | Age: 68
End: 2023-07-24
Payer: MEDICARE

## 2023-07-24 VITALS — BODY MASS INDEX: 40.66 KG/M2 | HEIGHT: 65.75 IN | WEIGHT: 250 LBS

## 2023-07-24 DIAGNOSIS — M48.062 LUMBAR STENOSIS WITH NEUROGENIC CLAUDICATION: Primary | ICD-10-CM

## 2023-07-24 PROCEDURE — 1125F AMNT PAIN NOTED PAIN PRSNT: CPT | Performed by: STUDENT IN AN ORGANIZED HEALTH CARE EDUCATION/TRAINING PROGRAM

## 2023-07-24 PROCEDURE — 99213 OFFICE O/P EST LOW 20 MIN: CPT | Performed by: STUDENT IN AN ORGANIZED HEALTH CARE EDUCATION/TRAINING PROGRAM

## 2023-07-24 RX ORDER — LEVOTHYROXINE SODIUM 0.12 MG/1
125 TABLET ORAL
Qty: 90 TABLET | Refills: 1 | Status: SHIPPED | OUTPATIENT
Start: 2023-07-24

## 2023-07-24 NOTE — TELEPHONE ENCOUNTER
Last TSH 06/07/2023 WNL     Last OV:   06/07/2023 Loyda Terrell RTC  05/05/2023 Dr Pamela Willingham   No FOV scheduled

## 2023-07-24 NOTE — TELEPHONE ENCOUNTER
Pt calling regarding refill.     He would like to know if he is to continue with 112MCG or go back to the higher dose of 125MCG

## 2023-07-24 NOTE — TELEPHONE ENCOUNTER
Yes continue 125mcg dosing. Can recheck TSH blood work in 2-3 months after June check.  So due in end of August/September

## 2023-07-24 NOTE — TELEPHONE ENCOUNTER
Please advise. Thank you! Should patient stay on current dose of levothyroxine? He is taking 125mcg. Previously taking 112mcg this was changed following 4/14/23 lab results.

## 2023-07-25 ENCOUNTER — TELEPHONE (OUTPATIENT)
Facility: CLINIC | Age: 68
End: 2023-07-25

## 2023-07-25 DIAGNOSIS — M17.0 PRIMARY OSTEOARTHRITIS OF BOTH KNEES: Primary | ICD-10-CM

## 2023-07-25 NOTE — TELEPHONE ENCOUNTER
Patient came in for office visit with  yesterday where he was given a PT order for his back     Patient the requested new PT order for his knee's please place PT order for [employer]   If appropriate    Patient last seen 12/14/22 for OA of both knees

## 2023-07-25 NOTE — TELEPHONE ENCOUNTER
called patient to inform him order for Pt has been placed and he can go ahead and schedule appointments received no answer.  My-chart message sent to patient

## 2023-07-26 ENCOUNTER — OFFICE VISIT (OUTPATIENT)
Dept: PHYSICAL THERAPY | Age: 68
End: 2023-07-26
Attending: STUDENT IN AN ORGANIZED HEALTH CARE EDUCATION/TRAINING PROGRAM
Payer: MEDICARE

## 2023-07-26 PROCEDURE — 97110 THERAPEUTIC EXERCISES: CPT

## 2023-07-26 NOTE — PROGRESS NOTES
Diagnosis:   Spondylolisthesis at L4-L5 level (M43.16)      Referring Provider: Jessie Jacob  Date of Evaluation:    6/25/23    Precautions:  None Next MD visit:   none scheduled  Date of Surgery: n/a   Insurance Primary/Secondary: MEDICARE / [employer]     # Auth Visits: n/a            Subjective: \"My B knees and my lower back is hurting more today \". Patient stated that he saw his MD and he would like me to continue PT for LB and start PT for my both knees \". Patient stated that he was able ambulate from parking lot to the PT office without lower back or knee pain but got shortness of breath \". Pain: 5-6/10 in the B hips and knees      Objective: See flowsheet for details      Assessment: continued to  alternate seated and standing activities to allow for  knees symptom relief in between WB exercises of his lower back . Patient demonstrates improved tolerance for hip strength ex's in standing position without need for rest brakes . Patient presents with improved upright posture with gait without a need for VC . Goals:   (to be met in 10 visits)   Pt will demonstrate good understanding of proper posture and body mechanics to decrease pain and improve spinal safety   Pt will improve lumbar spine AROM flexion to [facility] without pain to allow increase ease with bending forward to don shoes   Pt will report improved symptom centralization and absence of radicular symptoms for 3 consecutive days to improve function with ADL   Pt will have decreased paraspinal mm tension to tolerate standing >15 minutes for work and home activities   Pt will demonstrate improved core strength to be able to perform prolonged walking with <2/10 pain   Pt will be independent and compliant with comprehensive HEP to maintain progress achieved in PT     Plan: Will cont to progress towards functional goals.   Date:    7/05/23             TX#: 4/10 Date: 07/10/23            TX#: 5/10 Date: 07/12/23  Tx#: 6/10 Date : 07/17/23  TX # 7/10 Date : 07/19/23  TX # 8/10 Date : 07/26/23  TX # 9/10   Therapeutic Ex :  20 min  Therapeutic Ex :  40 min  THERAPEUTIC EX :  40 MIN  THERAPEUTIC EX :  45 MIN  Therapeutic Exercises:  40 mins Therapeutic Ex x 45 min    NU step x 7 min , ( level 4 )  NU step   7 min  NU step x 7 min , level 4  NU step x 8 min , level 4 NuStep (L5)  7 mins NU step x 7 min, level 5   Supine :  LTR x 5 reps each with 10 sec hold each   Bridging ( 1/2 range ) x 5 reps with 3 sec hold   TrA activation with :  Adductors activation x 5 reps with 5 sec hold ( with yellow ball )   Abductors activation with fitness [place] x 5 with 5 sec hold   Alt marches x 5 reps each   Alt TKE with knees on bolster x 5 reps each with 1.5 lbs   R/L QS x 10 reps each  Supine :  LTR R/L x 10 reps with 10 sec hold   R/L :  SKTC with belt assist x 10 reps each with 10 sec hold   R/L modified piriformis stretch x 5 reps each with 10 sec hold   R/L HS stretch with belt OP x 5 reps each with 10 sec hold   Adductor activation with yellow ball x 10 with 10 sec hold   Abductors activation with fitness [place] x 10 with 10 sec hold   TrA activation with marches with 1.5 lbs x 20   TrA activation with SLR x 10 reps with 10 sec hold   R/L quad sec x 20 reps each with 10 sec hold  Seated :  R/L :  modified piriformis stretch x 5 reps with 10 sec hold each   Quad set x 10 with 10 sec hold      TrA activation with :  Abductors  with fitness [place] x 20  Adductors with Yellow ball x 20  Marches x 20  Standing :  LB extension stretch over fulcrum x 20  Alt hip abduction with YTB x 20  Alt hip ext with YTB x 20  Sideways step in // bars  with YTB x 1 round    Seated :  R/L piriformis stretch x 5 reps each with 10 sec hold   R/L HS stretch x 5 reps with 10 sec hold   TrA activation with :    Adductors x 20  Abductors x 20  Marches x 20  R/L knee extension x 20  R/L hamstring curls with YTB x 20  Standing in // bars :  Gastroc stretch on slant board x 5 reps with 10 sec hold   B heel raises x 20  Lumbar extension x 20  Alt hip abd  with YTB x 20  Alt hip ext  with YTB  x 20  Sideways steps  with YTB x 2 rounds   Step up on 2 inch step  x 10 reps each    Slant Board Stretch  3x30 secs  Heel Raises  2x10  Sidestepping in //bars  3 laps  Seated Ball Squeezes  20x5 secs  Seated Hip Abd w/ Ring  20x5 secs  Standing Lumbar Ext  2x10  Standing March  2x10  Standing Alt Hip Abd  2x10  Seated Hams Stretch w/ Stool  2 mins ea  Seated Piriformis Stretch  1 min ea Standing :  Slant board stretches x 5 reps with 20 sec hold   Seated :  B adductors with TrA activation x 20 with 10 sec hold   B abductors activation with fitness circles x 10 with 10 sec hold   Standing : Alt hip abd with YTB x 20  Alt hip ext with YTB x 20   Marches x 20  Lower back extension x 20  Sideways steps in // bars with YTB x 2 rounds   Monster walk with in // bars x 2 rounds   Seated :  R/L HS stretch x 5 reps each with 20 sec hold   R/L HS curl with YTB x 10 reps each   R/L piriformis stretch x 5 reps each      Standing : Alt hip abd x 10  Alt hip ext x 10  Alt marches x 10  Sideways steps in // bars x 10 steps x 1 round Standing :  Lumbar extension against fulcrum x 10 reps with 5 sec hold   B gastroc stretch on slant board x 5 with 20 sec hold   Alt hip abd with YTB x 10   Alt hip ext with YTB x 10  Sideways steps in // bars with YTB x 1 round    Ice to L Knee  10 mins                    HEP : ( 07/05/2023 ) : supine : TrA activation , B QS . Standing : alt marches , sideways steps ( handout provided )    Charges:    Therapeutic Ex x 3  Total Timed Treatment: 40 min     Total Treatment Time: 45 min

## 2023-07-28 ENCOUNTER — APPOINTMENT (OUTPATIENT)
Dept: PHYSICAL THERAPY | Age: 68
End: 2023-07-28
Attending: STUDENT IN AN ORGANIZED HEALTH CARE EDUCATION/TRAINING PROGRAM
Payer: MEDICARE

## 2023-07-31 ENCOUNTER — OFFICE VISIT (OUTPATIENT)
Dept: PHYSICAL THERAPY | Age: 68
End: 2023-07-31
Attending: STUDENT IN AN ORGANIZED HEALTH CARE EDUCATION/TRAINING PROGRAM
Payer: MEDICARE

## 2023-07-31 PROCEDURE — 97110 THERAPEUTIC EXERCISES: CPT

## 2023-07-31 NOTE — PROGRESS NOTES
Diagnosis:   Spondylolisthesis at L4-L5 level (M43.16)      Referring Provider: Michelle Gaston  Date of Evaluation:    6/25/23    Precautions:  None Next MD visit:   none scheduled  Date of Surgery: n/a   Insurance Primary/Secondary: [employer] / [employer]     # Auth Visits: n/a           Discharge  Summary    Pt has attended 10 visits in Physical Therapy  . Subjective: Patient stated that his  lower back feels 40 % better since the start of PT and his is now able to ambulates longer distances without increase in lower back pain and without getting short of breath . Patient reports increased tolerance for sitting in his chair and for standing without increase in LB stiffness . Patient reports being able to perform sit to supine and sit to stand transfer with ease and is able to perform self care without difficulty . Patient stated that his gait distance tolerance and stairs negotiation is limited at this  time \" due to  severe B knees pain in standing position \".     Pain:  1-2/10 in lower back              5-6 / 10 in B knee in standing and walking       Objective:     Measurement  Date: 6/25 / 2023  Date:  7/31/2023   Strength / LE      Hip flexion :  R:5/5     L: 5/5    R:5/5      L: 5/5   Hip abduction ( seated );  R: 4/5     L: 4+/5   R: 4/5      L: 5/5   Hip adduction :  R: 4/5     L: 4/5   R: 5/5      L: 5/5   Knee flexion :  R: 5/5     L: 5/5  R: 5/5     L: 5/5    Knee extension :  R: 5/5     L: 5/5  R: 5/5     L: 5/5    DF:  R: 5/5     L: 5/5   R:5/5      L: 5/5   PF ( seated ) :  R: 4+/5   L: 4+/5   R:5/5      L: 5/5    Flexibility :     Hamstring : R: Mod loss , L: Min loss   R: Min loss   L: Min loss   Piriformis :  R: WNL        L: WNL  R: WNL        L: WNL     Oswestry Disability Index :        58 %        60 %   Gait : Patient ambulates on level ground with decrease step length B , decrease hip/ knee flex or ext B , decreased foot clearance B, B stooped posture / forward lean and difficulty turning  Patient ambulates on level ground with increase in B step length and increase in B heel strike/ toe push off and with improved gait symmetry . Patient demonstrates improved upright posture and increase in gait jason with gait . Assessment: Mrs Yusef Hyatt demonstrates improvement in B LE mm strength  and B LE flexibility and  in lumbar symptoms since the start of PT . Patient reports being able to sit , stand and walk for increased   periods of time however his standing tolerance is limited by ongoing B knee pain . Patient is able to perform all of his ADL's such as completing self care , sit to stand tranfers , donning shoes / socks , bending and carrying light objects with minimal difficulties and with minimal lower back pain . Patient reports compliance with his comprehensive HEP . At this time, Pt will be discharged from Physical Therapy. Thank you for referring Mrs Yanira Fontana to [address] [place] [address] . Goals:   (to be met in 10 visits)   Pt will demonstrate good understanding of proper posture and body mechanics to decrease pain and improve spinal safety - IMPROVED   Pt will improve lumbar spine AROM flexion to [facility] without pain to allow increase ease with bending forward to don shoes - IMPROVED   Pt will report improved symptom centralization and absence of radicular symptoms for 3 consecutive days to improve function with ADL - IMPROVED   Pt will have decreased paraspinal mm tension to tolerate standing >15 minutes for work and home activities - IMPROVED   Pt will demonstrate improved core strength to be able to perform prolonged walking with <2/10 pain - IMPROVER   Pt will be independent and compliant with comprehensive HEP to maintain progress achieved in PT - MET       Plan: patient will be discharges form PT at this time . Patient/Family/Caregiver was advised of these findings, precautions, and treatment options and has agreed to actively participate in planning and for this course of care.     Thank you for your referral. If you have any questions, please contact me at Dept: [phone]. Sincerely,  Electronically signed by therapist: Matt Lowery PTA     Physician's certification required:  No  Please co-sign or sign and return this letter via fax as soon as possible to [phone]. I certify the need for these services furnished under this plan of treatment and while under my care.     X___________________________________________________ Date__________      Date: 07/10/23            TX#: 5/10 Date: 07/12/23  Tx#: 6/10 Date : 07/17/23  TX # 7/10 Date : 07/19/23  TX # 8/10 Date : 07/26/23  TX # 9/10 Date : 07/31/23  TX # 10/10   Therapeutic Ex :  40 min  THERAPEUTIC EX :  40 MIN  THERAPEUTIC EX :  45 MIN  Therapeutic Exercises:  40 mins Therapeutic Ex x 45 min  Therapeutic Ex :    45 min    NU step   7 min  NU step x 7 min , level 4  NU step x 8 min , level 4 NuStep (L5)  7 mins NU step x 7 min, level 5 NU step x 9 min    Supine :  LTR R/L x 10 reps with 10 sec hold   R/L :  SKTC with belt assist x 10 reps each with 10 sec hold   R/L modified piriformis stretch x 5 reps each with 10 sec hold   R/L HS stretch with belt OP x 5 reps each with 10 sec hold   Adductor activation with yellow ball x 10 with 10 sec hold   Abductors activation with fitness [place] x 10 with 10 sec hold   TrA activation with marches with 1.5 lbs x 20   TrA activation with SLR x 10 reps with 10 sec hold   R/L quad sec x 20 reps each with 10 sec hold  Seated :  R/L :  modified piriformis stretch x 5 reps with 10 sec hold each   Quad set x 10 with 10 sec hold      TrA activation with :  Abductors  with fitness [place] x 20  Adductors with Yellow ball x 20  Marches x 20  Standing :  LB extension stretch over fulcrum x 20  Alt hip abduction with YTB x 20  Alt hip ext with YTB x 20  Sideways step in // bars  with YTB x 1 round    Seated :  R/L piriformis stretch x 5 reps each with 10 sec hold   R/L HS stretch x 5 reps with 10 sec hold TrA activation with :    Adductors x 20  Abductors x 20  Marches x 20  R/L knee extension x 20  R/L hamstring curls with YTB x 20  Standing in // bars :  Gastroc stretch on slant board x 5 reps with 10 sec hold   B heel raises x 20  Lumbar extension x 20  Alt hip abd  with YTB x 20  Alt hip ext  with YTB  x 20  Sideways steps  with YTB x 2 rounds   Step up on 2 inch step  x 10 reps each    Slant Board Stretch  3x30 secs  Heel Raises  2x10  Sidestepping in //bars  3 laps  Seated Ball Squeezes  20x5 secs  Seated Hip Abd w/ Ring  20x5 secs  Standing Lumbar Ext  2x10  Standing March  2x10  Standing Alt Hip Abd  2x10  Seated Hams Stretch w/ Stool  2 mins ea  Seated Piriformis Stretch  1 min ea Standing :  Slant board stretches x 5 reps with 20 sec hold   Seated :  B adductors with TrA activation x 20 with 10 sec hold   B abductors activation with fitness circles x 10 with 10 sec hold   Standing : Alt hip abd with YTB x 20  Alt hip ext with YTB x 20   Marches x 20  Lower back extension x 20  Sideways steps in // bars with YTB x 2 rounds   Monster walk with in // bars x 2 rounds   Seated :  R/L HS stretch x 5 reps each with 20 sec hold   R/L HS curl with YTB x 10 reps each   R/L piriformis stretch x 5 reps each    Standing :  Slant board : B gastroc stretch x 5 reps with 20 sec hold   B heel raises x 20  B toes raises x 20  Alt hip flex , abd , ext with YTB x 20  Sideways steps // bars with YTB x 2 rounds   Marches x 20  Lumbar extension AROM against fulcrum x 10  Seated : Alt marches with TrA activation x 20  R/L HS curls with YTB x 10 rep each    Standing :  Lumbar extension against fulcrum x 10 reps with 5 sec hold   B gastroc stretch on slant board x 5 with 20 sec hold   Alt hip abd with YTB x 10   Alt hip ext with YTB x 10  Sideways steps in // bars with YTB x 1 round    Ice to L Knee  10 mins                     HEP : ( 07/05/2023 ) : supine : TrA activation , B QS .  Standing : alt marches , sideways steps ( handout provided )    Charges:    Therapeutic Ex x 3  Total Timed Treatment: 40 min     Total Treatment Time: 45 min

## 2023-08-02 ENCOUNTER — APPOINTMENT (OUTPATIENT)
Dept: PHYSICAL THERAPY | Age: 68
End: 2023-08-02
Attending: STUDENT IN AN ORGANIZED HEALTH CARE EDUCATION/TRAINING PROGRAM
Payer: MEDICARE

## 2023-08-07 ENCOUNTER — APPOINTMENT (OUTPATIENT)
Dept: PHYSICAL THERAPY | Age: 68
End: 2023-08-07
Attending: STUDENT IN AN ORGANIZED HEALTH CARE EDUCATION/TRAINING PROGRAM
Payer: MEDICARE

## 2023-08-14 ENCOUNTER — OFFICE VISIT (OUTPATIENT)
Dept: ORTHOPEDICS CLINIC | Facility: CLINIC | Age: 68
End: 2023-08-14
Payer: MEDICARE

## 2023-08-14 VITALS — WEIGHT: 250 LBS | BODY MASS INDEX: 40.66 KG/M2 | HEIGHT: 65.75 IN

## 2023-08-14 DIAGNOSIS — M17.0 PRIMARY OSTEOARTHRITIS OF BOTH KNEES: Primary | ICD-10-CM

## 2023-08-14 RX ORDER — TRIAMCINOLONE ACETONIDE 40 MG/ML
80 INJECTION, SUSPENSION INTRA-ARTICULAR; INTRAMUSCULAR ONCE
Status: COMPLETED | OUTPATIENT
Start: 2023-08-14 | End: 2023-08-14

## 2023-08-14 RX ADMIN — TRIAMCINOLONE ACETONIDE 80 MG: 40 INJECTION, SUSPENSION INTRA-ARTICULAR; INTRAMUSCULAR at 15:34:00

## 2023-08-14 NOTE — PROGRESS NOTES
[facility] Progress Note    Subjective: Patient returns to clinic after last being seen by me on 12/14/2022 for bilateral steroid injections. He reports about 4 months of relief from the most recent steroid injections. He is now experienced a resurgence of pain in both knees, worse in the right knee. Pain located along the medial joint line of both knees. He has also been dealing with back issues which has been treated by Dr. Emeka Sheikh.     Objective: Patient seated comfortably in the exam chair. Alert and oriented x3. Nonlabored breathing. Gross neurologically intact. Ambulating independently in clinic today. Assessment/Plan: Patient with exacerbated bilateral knee osteoarthritis. I did discuss options of different injections with the patient in detail including corticosteroid injection, Zilretta, viscosupplementation injections. After thorough discussion, the patient endorsed interest in receiving bilateral corticosteroid injections in clinic today but would like to pursue viscosupplementation injections in the future. I will place a prior authorization request for bilateral Synvisc 1 injections in about 3 months, so that we can perform the injections into his knees in about 4 months. I did administer bilateral steroid injections into both knees, please see separate procedure note for additional details. I will follow-up with the patient after the gel injections have been approved. His questions were sought and answered satisfactorily. He is happy with the plan and will follow as advised. Gaudencio Charles PA-C  [facility]    This note was dictated using Dragon software. While it was briefly proofread prior to completion, some grammatical, spelling, and word choice errors due to dictation may still occur.

## 2023-08-14 NOTE — PROCEDURES
After informed consent, the patient's right and left knees were marked, locally anesthetized with skin refrigerant, prepped with topical antiseptic, and injected with a mixture of 1mL 40mg/mL Kenalog, 2mL 1% lidocaine and 2mL 0.5% marcaine through the inferolateral portal.  A band-aid was applied. The patient tolerated the procedure well.     Rosaline Cameron PA-C  [facility] Orthopedic Surgery

## 2023-08-15 ENCOUNTER — OFFICE VISIT (OUTPATIENT)
Dept: PHYSICAL THERAPY | Age: 68
End: 2023-08-15
Attending: PHYSICIAN ASSISTANT
Payer: MEDICARE

## 2023-08-15 DIAGNOSIS — M17.0 PRIMARY OSTEOARTHRITIS OF BOTH KNEES: Primary | ICD-10-CM

## 2023-08-15 PROCEDURE — 97110 THERAPEUTIC EXERCISES: CPT

## 2023-08-15 PROCEDURE — 97161 PT EVAL LOW COMPLEX 20 MIN: CPT

## 2023-08-21 ENCOUNTER — OFFICE VISIT (OUTPATIENT)
Dept: PHYSICAL THERAPY | Age: 68
End: 2023-08-21
Attending: PHYSICIAN ASSISTANT
Payer: MEDICARE

## 2023-08-21 PROCEDURE — 97110 THERAPEUTIC EXERCISES: CPT

## 2023-08-21 NOTE — PROGRESS NOTES
Diagnosis:   Primary osteoarthritis of both knees       Referring Provider: Nathalie Ham  Date of Evaluation:    8/15/2023    Precautions:    HTN Next MD visit:   none scheduled  Date of Surgery: n/a   Insurance Primary/Secondary: MEDICARE / [employer]     # Auth Visits: 10            Subjective: \" My B knees are hurting me today and I have hard time walking due to pain \". Pain: 4-7  /10 ( B knee with ambulation )      Objective:       Assessment: reviewed initial HEP with patient to ensure proper form . Patient returned demo. Goals: ( to be met in 10 visits )  1. Pt will improve knee AROM flexion to at least 128 degrees to improve ability to perform squatting activities . 2.Pt will improve quad strength to 5/5 to ascend 1 flight of stairs reciprocally with UE assist .  3. Pt will increase hip and knee strength to grossly 4+/5 to be able to get up and down from the floor safely . 4. Pt will demonstrate increased hip ER/ABD strength to 5/5 to perform stepping and squatting activities without excessive femoral IR/ADD. 5.Pt will improve SLS to 10 s to improve safety with gait on uneven surfaces such as grass and gravel. 6.Pt will be independent and compliant with comprehensive HEP to maintain progress achieved in PT . Plan: continue PT and advance towards current goals as able . Date: 8/21/2023  TX#: 2/10 Date:                 TX#: 3/ Date:                 TX#: 4/ Date:                 TX#: 5/ Date:    Tx#: 6/   NU step x 7 min , level 3  Supine :  R/L knee heel slides on sliding board x 10 reps each   R/L QS x 10 reps each with 10 sec hold   R/L SLR x 10 reps each   R/L TKE on bolster x 10 reps each with 10 se hold   B adductors with yellow ball x 20 with 5 sec hold   B abductors with fitness ring x 20 with 5 sec hold   Bridging x 20  Standing :  Gastroc stretch in slant board x 5 with 20 sec hold   B heel raises x 20  B toes raises x 20  Lateral steps with YTB in // bars x 1 round   Step up / lateral step up on 4 inch step x 10 reps each R/L                             HEP:     Charges: there ex x 3        Total Timed Treatment: 40 min  Total Treatment Time: 45 min

## 2023-08-23 ENCOUNTER — OFFICE VISIT (OUTPATIENT)
Dept: PHYSICAL THERAPY | Age: 68
End: 2023-08-23
Attending: PHYSICIAN ASSISTANT
Payer: MEDICARE

## 2023-08-23 PROCEDURE — 97110 THERAPEUTIC EXERCISES: CPT

## 2023-08-23 NOTE — PROGRESS NOTES
Diagnosis:   Primary osteoarthritis of both knees       Referring Provider: Camron March  Date of Evaluation:    8/15/2023    Precautions:    HTN Next MD visit:   none scheduled  Date of Surgery: n/a   Insurance Primary/Secondary: MEDICARE / [employer]     # Auth Visits: 10            Subjective: \" I was able to walk from parking lot to the PT gym with some knee pain \". Pain: 4-7  /10 ( B knee with ambulation )      Objective:       Assessment: increased reps on all hip strength ex's in standing position and added lunges on to BOSU to further progress stability with gait . Patient was able to perform all given ex's without report of increase in B knees pain . Goals: ( to be met in 10 visits )  1. Pt will improve knee AROM flexion to at least 128 degrees to improve ability to perform squatting activities . 2.Pt will improve quad strength to 5/5 to ascend 1 flight of stairs reciprocally with UE assist .  3. Pt will increase hip and knee strength to grossly 4+/5 to be able to get up and down from the floor safely . 4. Pt will demonstrate increased hip ER/ABD strength to 5/5 to perform stepping and squatting activities without excessive femoral IR/ADD. 5.Pt will improve SLS to 10 s to improve safety with gait on uneven surfaces such as grass and gravel. 6.Pt will be independent and compliant with comprehensive HEP to maintain progress achieved in PT . Plan: continue PT and advance towards current goals as able . Date: 8/21/2023  TX#: 2/10 Date:    8/23/23             TX#: 3/10 Date:                 TX#: 4/ Date:                 TX#: 5/ Date:    Tx#: 6/   NU step x 7 min , level 3  Supine :  R/L knee heel slides on sliding board x 10 reps each   R/L QS x 10 reps each with 10 sec hold   R/L SLR x 10 reps each   R/L TKE on bolster x 10 reps each with 10 se hold   B adductors with yellow ball x 20 with 5 sec hold   B abductors with fitness ring x 20 with 5 sec hold   Bridging x 20  Standing :  Gastroc stretch in slant board x 5 with 20 sec hold   B heel raises x 20  B toes raises x 20  Lateral steps with YTB in // bars x 1 round   Step up / lateral step up on 4 inch step x 10 reps each R/L  NU step x 7 min , level 6  Supine :  QS x 20 with 10 sec hold   R/L SLR x 10 each   R/L TKE on bolster with 1.5 lbs  x 20  B adductors activation with yellow ball x 20  B abductors activation with fitness ring x 20  B bridging x 20   Standing :  B gastroc stretch on slant board x 5 reps with 20 sec hold   B heel raises x 20  B toes raises x 20  TKE R/L against wall with yellow ball x 10 reps each   Alt hip abd with YTB x 20  Alt hip ext with YTB x 20  Lateral steps in // bars with YTB x 2 rounds   BOSU :   Alt lunges x 10 reps each   Step up / lateral step up on 4 inch step x 15 reps each                              HEP:     Charges: there ex x 3        Total Timed Treatment: 40 min  Total Treatment Time: 45 min

## 2023-08-25 RX ORDER — MECLIZINE HYDROCHLORIDE 25 MG/1
25 TABLET ORAL 3 TIMES DAILY PRN
Qty: 20 TABLET | Refills: 0 | Status: SHIPPED | OUTPATIENT
Start: 2023-08-25

## 2023-08-25 NOTE — TELEPHONE ENCOUNTER
Failed protocol     Last refill:  Meclizine HCl 25 MG Oral Tab 20 tablet 0 7/1/2021    Sig:   Take 1 tablet (25 mg total) by mouth 3 (three) times daily as needed for Dizziness.        LOV:   06/07/2023 Silvester Romberg PA RTC Dr Ryan Dodge   No FOV scheduled

## 2023-08-28 ENCOUNTER — APPOINTMENT (OUTPATIENT)
Dept: PHYSICAL THERAPY | Age: 68
End: 2023-08-28
Attending: PHYSICIAN ASSISTANT
Payer: MEDICARE

## 2023-08-30 ENCOUNTER — APPOINTMENT (OUTPATIENT)
Dept: PHYSICAL THERAPY | Age: 68
End: 2023-08-30
Attending: PHYSICIAN ASSISTANT
Payer: MEDICARE

## 2023-09-05 ENCOUNTER — APPOINTMENT (OUTPATIENT)
Dept: PHYSICAL THERAPY | Age: 68
End: 2023-09-05
Attending: PHYSICIAN ASSISTANT
Payer: MEDICARE

## 2023-09-07 ENCOUNTER — APPOINTMENT (OUTPATIENT)
Dept: PHYSICAL THERAPY | Age: 68
End: 2023-09-07
Attending: PHYSICIAN ASSISTANT
Payer: MEDICARE

## 2023-09-11 ENCOUNTER — APPOINTMENT (OUTPATIENT)
Dept: PHYSICAL THERAPY | Age: 68
End: 2023-09-11
Attending: PHYSICIAN ASSISTANT
Payer: MEDICARE

## 2023-09-11 ENCOUNTER — TELEPHONE (OUTPATIENT)
Dept: PHYSICAL THERAPY | Facility: HOSPITAL | Age: 68
End: 2023-09-11

## 2023-09-13 ENCOUNTER — APPOINTMENT (OUTPATIENT)
Dept: PHYSICAL THERAPY | Age: 68
End: 2023-09-13
Attending: PHYSICIAN ASSISTANT
Payer: MEDICARE

## 2023-09-13 ENCOUNTER — TELEPHONE (OUTPATIENT)
Dept: PHYSICAL THERAPY | Facility: HOSPITAL | Age: 68
End: 2023-09-13

## 2023-09-13 ENCOUNTER — TELEPHONE (OUTPATIENT)
Dept: INTERNAL MEDICINE CLINIC | Facility: CLINIC | Age: 68
End: 2023-09-13

## 2023-09-13 NOTE — TELEPHONE ENCOUNTER
Pt is having vertigo off and on -he was prescribed meclizine however he is still having symptoms. He is requesting an apt. .he thinks it he may have fluid in the ears. Olimpia Gamez to put in at the resp spot today?

## 2023-09-18 ENCOUNTER — APPOINTMENT (OUTPATIENT)
Dept: PHYSICAL THERAPY | Age: 68
End: 2023-09-18
Attending: PHYSICIAN ASSISTANT
Payer: MEDICARE

## 2023-09-20 ENCOUNTER — APPOINTMENT (OUTPATIENT)
Dept: PHYSICAL THERAPY | Age: 68
End: 2023-09-20
Attending: PHYSICIAN ASSISTANT
Payer: MEDICARE

## 2023-09-25 ENCOUNTER — OFFICE VISIT (OUTPATIENT)
Dept: INTERNAL MEDICINE CLINIC | Facility: CLINIC | Age: 68
End: 2023-09-25
Payer: MEDICARE

## 2023-09-25 ENCOUNTER — APPOINTMENT (OUTPATIENT)
Dept: PHYSICAL THERAPY | Age: 68
End: 2023-09-25
Attending: PHYSICIAN ASSISTANT
Payer: MEDICARE

## 2023-09-25 VITALS
RESPIRATION RATE: 20 BRPM | WEIGHT: 249 LBS | DIASTOLIC BLOOD PRESSURE: 68 MMHG | HEART RATE: 78 BPM | TEMPERATURE: 99 F | BODY MASS INDEX: 40.5 KG/M2 | HEIGHT: 65.75 IN | OXYGEN SATURATION: 98 % | SYSTOLIC BLOOD PRESSURE: 124 MMHG

## 2023-09-25 DIAGNOSIS — R42 VERTIGO: ICD-10-CM

## 2023-09-25 DIAGNOSIS — R59.0 ENLARGED LYMPH NODE IN NECK: ICD-10-CM

## 2023-09-25 DIAGNOSIS — I10 ESSENTIAL HYPERTENSION: ICD-10-CM

## 2023-09-25 DIAGNOSIS — J02.9 SORE THROAT: Primary | ICD-10-CM

## 2023-09-25 LAB
CONTROL LINE PRESENT WITH A CLEAR BACKGROUND (YES/NO): YES YES/NO
KIT LOT #: 6668 NUMERIC

## 2023-09-25 RX ORDER — AMOXICILLIN AND CLAVULANATE POTASSIUM 875; 125 MG/1; MG/1
1 TABLET, FILM COATED ORAL 2 TIMES DAILY
Qty: 20 TABLET | Refills: 0 | Status: SHIPPED | OUTPATIENT
Start: 2023-09-25 | End: 2023-10-05

## 2023-09-25 NOTE — PATIENT INSTRUCTIONS
Start antibiotics only if symptoms worsen, develop fever, or are not improving after 10-14 days    Blood work for thyroid (order is already in the system)

## 2023-09-27 ENCOUNTER — APPOINTMENT (OUTPATIENT)
Dept: PHYSICAL THERAPY | Age: 68
End: 2023-09-27
Attending: PHYSICIAN ASSISTANT
Payer: MEDICARE

## 2023-10-03 ENCOUNTER — APPOINTMENT (OUTPATIENT)
Dept: PHYSICAL THERAPY | Age: 68
End: 2023-10-03
Attending: PHYSICIAN ASSISTANT
Payer: MEDICARE

## 2023-10-18 ENCOUNTER — LAB ENCOUNTER (OUTPATIENT)
Dept: LAB | Age: 68
End: 2023-10-18
Attending: INTERNAL MEDICINE
Payer: MEDICARE

## 2023-10-18 DIAGNOSIS — E03.9 HYPOTHYROIDISM, UNSPECIFIED TYPE: ICD-10-CM

## 2023-10-18 DIAGNOSIS — E03.9 HYPOTHYROIDISM, UNSPECIFIED TYPE: Primary | ICD-10-CM

## 2023-10-18 LAB
T4 FREE SERPL-MCNC: 1.3 NG/DL (ref 0.8–1.7)
TSI SER-ACNC: 5.41 MIU/ML (ref 0.36–3.74)

## 2023-10-18 PROCEDURE — 84443 ASSAY THYROID STIM HORMONE: CPT

## 2023-10-18 PROCEDURE — 36415 COLL VENOUS BLD VENIPUNCTURE: CPT

## 2023-10-18 PROCEDURE — 84439 ASSAY OF FREE THYROXINE: CPT

## 2023-10-18 RX ORDER — LEVOTHYROXINE SODIUM 137 UG/1
137 TABLET ORAL
Qty: 90 TABLET | Refills: 1 | Status: SHIPPED | OUTPATIENT
Start: 2023-10-18

## 2023-11-07 DIAGNOSIS — M17.0 PRIMARY OSTEOARTHRITIS OF BOTH KNEES: Primary | ICD-10-CM

## 2023-11-07 NOTE — PROGRESS NOTES
As previously mentioned in office note on 8/14/23, will submit for bilateral Synvisc One injections.

## 2023-11-09 ENCOUNTER — TELEPHONE (OUTPATIENT)
Dept: ORTHOPEDICS CLINIC | Facility: CLINIC | Age: 68
End: 2023-11-09

## 2023-11-25 ENCOUNTER — HOSPITAL ENCOUNTER (EMERGENCY)
Age: 68
Discharge: HOME | End: 2023-11-25
Attending: EMERGENCY MEDICINE
Payer: MEDICARE

## 2023-11-25 VITALS
RESPIRATION RATE: 18 BRPM | WEIGHT: 245 LBS | OXYGEN SATURATION: 99 % | HEART RATE: 74 BPM | DIASTOLIC BLOOD PRESSURE: 79 MMHG | SYSTOLIC BLOOD PRESSURE: 156 MMHG | BODY MASS INDEX: 40 KG/M2 | TEMPERATURE: 98 F

## 2023-11-25 DIAGNOSIS — J01.90 ACUTE NON-RECURRENT SINUSITIS, UNSPECIFIED LOCATION: ICD-10-CM

## 2023-11-25 DIAGNOSIS — I10 PRIMARY HYPERTENSION: Primary | ICD-10-CM

## 2023-11-25 PROCEDURE — 99283 EMERGENCY DEPT VISIT LOW MDM: CPT

## 2023-11-25 PROCEDURE — 99284 EMERGENCY DEPT VISIT MOD MDM: CPT

## 2023-11-25 RX ORDER — AMLODIPINE BESYLATE 10 MG/1
10 TABLET ORAL DAILY
Qty: 30 TABLET | Refills: 0 | Status: SHIPPED | OUTPATIENT
Start: 2023-11-25 | End: 2023-12-06

## 2023-11-25 RX ORDER — AMOXICILLIN AND CLAVULANATE POTASSIUM 875; 125 MG/1; MG/1
1 TABLET, FILM COATED ORAL 2 TIMES DAILY
Qty: 20 TABLET | Refills: 0 | Status: SHIPPED | OUTPATIENT
Start: 2023-11-25 | End: 2023-12-05

## 2023-11-25 NOTE — ED INITIAL ASSESSMENT (HPI)
Elevated BP since yesterday. States ran out of amlodipine yesterday. Also with c/o anxious since today. C/o unable to  clear throat- had recent uri in last few week.

## 2023-11-27 ENCOUNTER — TELEPHONE (OUTPATIENT)
Dept: INTERNAL MEDICINE CLINIC | Facility: CLINIC | Age: 68
End: 2023-11-27

## 2023-11-27 DIAGNOSIS — E78.2 MIXED HYPERLIPIDEMIA: Primary | ICD-10-CM

## 2023-11-27 DIAGNOSIS — R73.03 PREDIABETES: ICD-10-CM

## 2023-11-27 DIAGNOSIS — R05.8 POST-VIRAL COUGH SYNDROME: ICD-10-CM

## 2023-11-27 RX ORDER — BENZONATATE 200 MG/1
200 CAPSULE ORAL 3 TIMES DAILY PRN
Qty: 30 CAPSULE | Refills: 0 | Status: SHIPPED | OUTPATIENT
Start: 2023-11-27

## 2023-11-27 NOTE — TELEPHONE ENCOUNTER
Please offer appointment for further evaluation. Can add on at 2pm on Friday Dec 1 or next available    Additional blood work entered for lipid panel and CMP. CRP is not tested routinely except for monitoring of certain rheumatologic conditions and ordered by the rheumatologist. Likely his CRP would be elevated due to his multiple medical conditions. It is non-specific and not helpful for giving a reason for uncontrolled blood pressure. I do not order. Ok to take benzonatate and famotidine.

## 2023-11-27 NOTE — TELEPHONE ENCOUNTER
Patient is requesting a cb from a nurse. Pt was seen over the weekend for high BP and would like to further discuss with a nurse.

## 2023-11-27 NOTE — TELEPHONE ENCOUNTER
Spoke to pt, informed of lipid panel and CMP added to his TSH lab. Pt is going to make appt for tomorrow. Pt asked for refill on benzonatate because it was last filled 2022 and he does not want to take if . Refill sent.      Scheduled appt to discuss with LS:    Future Appointments   Date Time Provider Lelo Richards   2023  2:20 PM Cristin Gaffney PA-C [facility]   2023  1:45 PM Rohit Louie MD [facility]

## 2023-11-27 NOTE — TELEPHONE ENCOUNTER
LS - pt has multiple requests, please advise, ty! Spoke to pt, he is due to repeat his TSH tomorrow. Pt is asking if he can have lipid, CMP, and CRP ordered to have done at the same time. Pt said when he went to ED on 11/25 it was because his blood pressure was almost 200 and he also has anxiety about that. Pt confirmed he now has refilled the amlodipine, but several family members have told him to have CRP to check for inflammation in the blood as possible cause of his blood pressure not being controlled. Pt also said 2 weeks ago, he had a cold, tested negative for covid, but the cough has not resolved. Pt has trouble clearing his throat, and cough is triggered by eating, drinking and cold weather. Pt denies fever, chills, or nasal congestion. Pt asking if okay to re-start famotidine to see if the cause could be acid reflux, and is asking to try benzonatate again, which he had last year. Pt's BP today was 136/80.

## 2023-11-29 ENCOUNTER — LAB ENCOUNTER (OUTPATIENT)
Dept: LAB | Age: 68
End: 2023-11-29
Attending: INTERNAL MEDICINE
Payer: MEDICARE

## 2023-11-29 DIAGNOSIS — E03.9 HYPOTHYROIDISM, UNSPECIFIED TYPE: ICD-10-CM

## 2023-11-29 DIAGNOSIS — R73.03 PREDIABETES: ICD-10-CM

## 2023-11-29 DIAGNOSIS — E78.2 MIXED HYPERLIPIDEMIA: ICD-10-CM

## 2023-11-29 LAB
ALBUMIN SERPL-MCNC: 4 G/DL (ref 3.4–5)
ALBUMIN/GLOB SERPL: 0.9 {RATIO} (ref 1–2)
ALP LIVER SERPL-CCNC: 97 U/L
ALT SERPL-CCNC: 21 U/L
ANION GAP SERPL CALC-SCNC: 4 MMOL/L (ref 0–18)
AST SERPL-CCNC: 14 U/L (ref 15–37)
BILIRUB SERPL-MCNC: 0.9 MG/DL (ref 0.1–2)
BUN BLD-MCNC: 13 MG/DL (ref 9–23)
CALCIUM BLD-MCNC: 9.3 MG/DL (ref 8.5–10.1)
CHLORIDE SERPL-SCNC: 104 MMOL/L (ref 98–112)
CHOLEST SERPL-MCNC: 193 MG/DL (ref ?–200)
CO2 SERPL-SCNC: 28 MMOL/L (ref 21–32)
CREAT BLD-MCNC: 1.18 MG/DL
EGFRCR SERPLBLD CKD-EPI 2021: 68 ML/MIN/1.73M2 (ref 60–?)
EST. AVERAGE GLUCOSE BLD GHB EST-MCNC: 131 MG/DL (ref 68–126)
FASTING PATIENT LIPID ANSWER: YES
FASTING STATUS PATIENT QL REPORTED: YES
GLOBULIN PLAS-MCNC: 4.5 G/DL (ref 2.8–4.4)
GLUCOSE BLD-MCNC: 110 MG/DL (ref 70–99)
HBA1C MFR BLD: 6.2 % (ref ?–5.7)
HDLC SERPL-MCNC: 42 MG/DL (ref 40–59)
LDLC SERPL CALC-MCNC: 124 MG/DL (ref ?–100)
NONHDLC SERPL-MCNC: 151 MG/DL (ref ?–130)
OSMOLALITY SERPL CALC.SUM OF ELEC: 283 MOSM/KG (ref 275–295)
POTASSIUM SERPL-SCNC: 4.3 MMOL/L (ref 3.5–5.1)
PROT SERPL-MCNC: 8.5 G/DL (ref 6.4–8.2)
SODIUM SERPL-SCNC: 136 MMOL/L (ref 136–145)
TRIGL SERPL-MCNC: 152 MG/DL (ref 30–149)
TSI SER-ACNC: 1.07 MIU/ML (ref 0.36–3.74)
VLDLC SERPL CALC-MCNC: 27 MG/DL (ref 0–30)

## 2023-11-29 PROCEDURE — 36415 COLL VENOUS BLD VENIPUNCTURE: CPT

## 2023-11-29 PROCEDURE — 80053 COMPREHEN METABOLIC PANEL: CPT

## 2023-11-29 PROCEDURE — 80061 LIPID PANEL: CPT

## 2023-11-29 PROCEDURE — 84443 ASSAY THYROID STIM HORMONE: CPT

## 2023-11-29 PROCEDURE — 83036 HEMOGLOBIN GLYCOSYLATED A1C: CPT

## 2023-12-04 ENCOUNTER — OFFICE VISIT (OUTPATIENT)
Dept: ORTHOPEDICS CLINIC | Facility: CLINIC | Age: 68
End: 2023-12-04
Payer: MEDICARE

## 2023-12-04 VITALS — WEIGHT: 245 LBS | BODY MASS INDEX: 39.37 KG/M2 | HEIGHT: 66 IN

## 2023-12-04 DIAGNOSIS — M17.0 PRIMARY OSTEOARTHRITIS OF BOTH KNEES: Primary | ICD-10-CM

## 2023-12-04 NOTE — PROCEDURES
After informed consent, the patient's right and left knees were marked, locally anesthetized with skin refrigerant, prepped with topical antiseptic, and injected with 6mL of 48mg/6mL Synvisc One through the inferolateral portal.  A band-aid was applied. The patient tolerated the procedure well.     RHONDA Booker [facility]

## 2023-12-06 ENCOUNTER — OFFICE VISIT (OUTPATIENT)
Dept: INTERNAL MEDICINE CLINIC | Facility: CLINIC | Age: 68
End: 2023-12-06
Payer: MEDICARE

## 2023-12-06 VITALS
DIASTOLIC BLOOD PRESSURE: 78 MMHG | TEMPERATURE: 98 F | SYSTOLIC BLOOD PRESSURE: 134 MMHG | BODY MASS INDEX: 41 KG/M2 | RESPIRATION RATE: 15 BRPM | HEART RATE: 74 BPM | OXYGEN SATURATION: 98 % | WEIGHT: 253.38 LBS

## 2023-12-06 DIAGNOSIS — E78.2 MIXED HYPERLIPIDEMIA: ICD-10-CM

## 2023-12-06 DIAGNOSIS — I10 ESSENTIAL HYPERTENSION: Primary | ICD-10-CM

## 2023-12-06 DIAGNOSIS — E03.9 HYPOTHYROIDISM, UNSPECIFIED TYPE: ICD-10-CM

## 2023-12-06 DIAGNOSIS — J06.9 VIRAL URI: ICD-10-CM

## 2023-12-06 DIAGNOSIS — R73.03 PREDIABETES: ICD-10-CM

## 2023-12-06 PROCEDURE — 99214 OFFICE O/P EST MOD 30 MIN: CPT | Performed by: INTERNAL MEDICINE

## 2023-12-20 ENCOUNTER — TELEPHONE (OUTPATIENT)
Dept: ORTHOPEDICS CLINIC | Facility: CLINIC | Age: 68
End: 2023-12-20

## 2023-12-20 NOTE — TELEPHONE ENCOUNTER
Patient states he has not had any relief after the gel injection and wants to know what to do next.

## 2023-12-20 NOTE — TELEPHONE ENCOUNTER
FYI  Patient calling to update that synvisc was ineffective. (States you told him to call the office if they did not work)    LOV: 12/4/23 (Synvisc received)  8/14/23: steroid shot (more effective than Synvisc)    Current treatment: exercises   Symptoms: increased stiffness, trouble walking    Do you want us to get him rescheduled for a steroid injection?

## 2023-12-26 ENCOUNTER — TELEPHONE (OUTPATIENT)
Dept: INTERNAL MEDICINE CLINIC | Facility: CLINIC | Age: 68
End: 2023-12-26

## 2023-12-26 DIAGNOSIS — R42 VERTIGO: ICD-10-CM

## 2023-12-26 DIAGNOSIS — G47.9 TROUBLE IN SLEEPING: Primary | ICD-10-CM

## 2023-12-26 RX ORDER — PHENOL 1.4 %
10 AEROSOL, SPRAY (ML) MUCOUS MEMBRANE AS NEEDED
COMMUNITY
Start: 2023-12-26

## 2023-12-26 NOTE — TELEPHONE ENCOUNTER
Patient calling to speak to nurse. Had vertigo attack 2 days ago and not feeling well. Also has questions regarding famotidine.

## 2023-12-26 NOTE — TELEPHONE ENCOUNTER
LS - LOV 12/6/23, pt worried that something worse may be happening to cause the vertigo episodes. Please advise, ty! Spoke to pt who is feeling anxious about recurring vertigo episodes. Pt said the most recent episode had him feeling terrible for 6-7 hours. Pt stopped taking the meclizine because it was worsening his anxiety. Pt's son is in his residency for physical therapy. Son gave pt exercises for the vertigo which seemed to help. Pt has not been sleeping very well and son suggested he try melatonin 10 mg, which seems to be helping so far. Pt has appt with ENT in January. When pt is reading or watching TV for longer than a few minutes, he describes feeling a \"jolt\" like a pre-cursor to a vertigo episode. Pt would like to know if LS has further recommendations for him. He is anxious as to why the vertigo keeps happening and that is also worsening his anxiety overall. RN informed pt that LS is not in the office today. Pt v/u. Yadira to send [facility] to the pt, or call him with any suggestions LS may have.

## 2023-12-27 NOTE — TELEPHONE ENCOUNTER
Recommend ENT follow up - has seen Dr. Ovidio Stout previously. I can prescribe vestibular therapy if he wanted to try it. I don't have any additional recommendations; needs specialist level of care.

## 2023-12-28 NOTE — TELEPHONE ENCOUNTER
OCTAVIA JOE    Spoke to pt, he tried to call back yesterday, but left a message. Pt is agreeable to trying the vestibular therapy with [facility] in [place]. Pt asks that contact information be sent to him via [facility]. Referral placed, faxed in open status (Medicare) to PT #[phone] and received confirmation page. Sent [facility] to pt with scheduling phone number. Condition update:  Pt said he saw cardiology yesterday and cardiologist said pt may need an injection if carotid(s) have blockage. They are going to repeat his US Carotid as the last one was done 3/31/22. Pt is having that test today.

## 2023-12-29 ENCOUNTER — TELEPHONE (OUTPATIENT)
Dept: ORTHOPEDICS CLINIC | Facility: CLINIC | Age: 68
End: 2023-12-29

## 2023-12-29 NOTE — TELEPHONE ENCOUNTER
Pt states he is having ongoing back pain and would like more PT. Please advise. Thank you!     LOV 07/24/23 Lumbar stenosis  No upcoming appointments

## 2023-12-29 NOTE — TELEPHONE ENCOUNTER
Appt made for patient.  He will call if not needed (he feels it may be due to the way he is standing and walking after his knee injections.)

## 2024-01-05 ENCOUNTER — MED REC SCAN ONLY (OUTPATIENT)
Dept: INTERNAL MEDICINE CLINIC | Facility: CLINIC | Age: 69
End: 2024-01-05

## 2024-01-05 PROBLEM — I65.23 BILATERAL CAROTID ARTERY STENOSIS: Status: ACTIVE | Noted: 2024-01-05

## 2024-01-10 ENCOUNTER — TELEPHONE (OUTPATIENT)
Dept: PHYSICAL THERAPY | Facility: HOSPITAL | Age: 69
End: 2024-01-10

## 2024-01-23 ENCOUNTER — OFFICE VISIT (OUTPATIENT)
Dept: PHYSICAL THERAPY | Age: 69
End: 2024-01-23
Attending: STUDENT IN AN ORGANIZED HEALTH CARE EDUCATION/TRAINING PROGRAM
Payer: MEDICARE

## 2024-01-23 ENCOUNTER — TELEPHONE (OUTPATIENT)
Dept: PHYSICAL THERAPY | Facility: HOSPITAL | Age: 69
End: 2024-01-23

## 2024-01-23 DIAGNOSIS — M48.062 LUMBAR STENOSIS WITH NEUROGENIC CLAUDICATION: Primary | ICD-10-CM

## 2024-01-23 PROCEDURE — 97162 PT EVAL MOD COMPLEX 30 MIN: CPT

## 2024-01-23 PROCEDURE — 97110 THERAPEUTIC EXERCISES: CPT

## 2024-01-23 NOTE — PROGRESS NOTES
SPINE EVALUATION:     Diagnosis:    Lumbar stenosis with neurogenic claudication (M48.062)       Referring Provider: Echo  Date of Evaluation:    1/23/2024    Precautions:   HTN and fall risk due to vertigo Next MD visit:   none scheduled  Date of Surgery: n/a     PATIENT SUMMARY   Arron Westfall is a 68 year old male who presents to therapy today with complaints of increased LBP, primarily on the R with some radiation into the R thigh.  Pt states that his symptoms vary depending on the day and the activities that he performs.  Pt has has previous issues with LBP where he received Physical Therapy back in May 2023.    Pt describes pain level current 4-5/10, at best 4-5/10, at worst 9/10.   Current functional limitations include sitting, walking, stair negotiation, sit to stand transfers, car transfers.     Arron describes prior level of function as having minimal limitations with walking activities. Pt goals include being able to walk I without pain..  Past medical history was reviewed with Arron. Significant findings include:   Past Medical History:   Diagnosis Date    Abdominal hernia 8 yrs ago    Abdominal pain     Arthritis     Back problem     lower    Bloating 2weeks ago    Constipation     Dizziness 2 weeks ago    Essential hypertension     Flatulence/gas pain/belching 2 weeks ago    Heartburn 12yrs ago    Hemorrhoids since18 yrs ago    Hyperlipidemia     Hypovitaminosis D     Indigestion 12yrs ago    Irregular bowel habits     Pain in joints     Pain with bowel movements sometimes    Sleep apnea since18 yrs ago    Sleep disturbance     Sputum production 2weeks ago    Unspecified hypothyroidism     Wears glasses     Wheezing      ASSESSMENT  Arron presents to physical therapy evaluation with primary c/o R sided LBP/glute pain. The results of the objective tests and measures show deficits with lumbar AROM, LE flexibility, LE strength, balance, gait. And soft tissue mobility.  Functional deficits include  but are not limited to sitting, walking, stair negotiation, sit to stand transfers, and car transfers.  Signs and symptoms are consistent with diagnosis of lumbar stenosis with neurogenic claudication (M48.062). Pt and PT discussed evaluation findings, pathology, POC and HEP.  Pt voiced understanding and performs HEP correctly without reported pain. Skilled Physical Therapy is medically necessary to address the above impairments and reach functional goals.     OBJECTIVE:   Observation/Posture: FHP and rounded shoulders  Neuro Screen:     Lumbar AROM: (* denotes performed with pain)  Flexion: Min restriction*  Extension: Min restriction*  Sidebending: R Mod restriction; L Mod restriction  Rotation: R Min-mod restriction; L Min-mod restriction    Accessory motion: NT  Palpation: TTP/STRs noted along the R lower lumbar paraspinals and upper glute/piriformis    Strength: (* denotes performed with pain)  LE   Hip flexion (L2): R 4+/5; L 4+/5  Hip abduction (seated): R 4/5; L 4/5  Hip Extension: R NT; L NT   Knee Flexion: R 4+/5; L 4+/5   Knee extension (L3): R 4/5; L 4/5   DF (L4): R 5/5; L 5/5  PF (S1): R 5/5; L 5/5     Flexibility: NT due to vertigo symptoms   LE   Hamstrings: R NT; L NT       Gait: Amb with stiff legs due to knee pain and tends to woddle.  Balance: SLS R <5 secs, L <5 secs    Today’s Treatment:   Therapeutic Exercises (12 mins): Ball Squeezes 20x5 secs, Hip Abd w/ Ring 20x5 secs, and patient education regarding effects of knee discomfort on low back.    Pt education was provided on exam findings, treatment diagnosis, treatment plan, expectations, and prognosis. Pt was also provided recommendations for activity modifications, possible soreness after evaluation, modalities as needed [ice/heat], pain science education , importance of remaining active, and shoe wear    Patient was instructed in and issued a HEP for: pushed until next session due to vertigo symptoms    Charges:   PT Eval Moderate  Complexity  Therapeutic Ex x1        Total Timed Treatment: 12 min       Total Treatment Time: 45 min     Based on clinical rationale and outcome measures, this evaluation involved Moderate Complexity decision making due to 1-2 personal factors/comorbidities, 3 body structures involved/activity limitations, and evolving symptoms including changing pain levels.  PLAN OF CARE:    Goals: (to be met in 10 visits)   Pt will improve transversus abdominis recruitment to perform proper isometric contraction without requiring verbal or tactile cuing to promote advancement of therex   Pt will demonstrate good understanding of proper posture and body mechanics to decrease pain and improve spinal safety   Pt will be able to amb >150 ft  without reports of LBP.  Pt will have decreased paraspinal mm tension to tolerate standing >10 minutes for community and home activities   Pt will demonstrate improved core strength to be able to perform prolonged sitting activities with <3/10 pain   Pt will be independent and compliant with comprehensive HEP to maintain progress achieved in PT     Frequency / Duration: Patient will be seen for 2 x/week or a total of 10 visits over a 90 day period. Treatment will include: Manual Therapy, Neuromuscular Re-education, Therapeutic Activities, Therapeutic Exercise, and Home Exercise Program instruction    Education or treatment limitation: None  Rehab Potential:good    Oswestry Disability Index Score  Score: 50 % (1/20/2024  5:03 PM)      Patient/Family/Caregiver was advised of these findings, precautions, and treatment options and has agreed to actively participate in planning and for this course of care.    Thank you for your referral. Please co-sign or sign and return this letter via fax as soon as possible to [phone]. If you have any questions, please contact me at Dept: [phone]    Sincerely,  Electronically signed by therapist: Annabelle Leyva, PT    Physician's certification  required: Yes  I certify the need for these services furnished under this plan of treatment and while under my care.    X___________________________________________________ Date____________________    Certification From: 1/23/2024  To:4/22/2024

## 2024-01-25 ENCOUNTER — OFFICE VISIT (OUTPATIENT)
Dept: PHYSICAL THERAPY | Age: 69
End: 2024-01-25
Attending: STUDENT IN AN ORGANIZED HEALTH CARE EDUCATION/TRAINING PROGRAM
Payer: MEDICARE

## 2024-01-25 PROCEDURE — 97110 THERAPEUTIC EXERCISES: CPT

## 2024-01-25 NOTE — PROGRESS NOTES
Diagnosis:   Lumbar stenosis with neurogenic claudication (M48.062)         Referring Provider: Echo  Date of Evaluation:    1/23/24    Precautions:    HTN and fall risk due to vertigo Next MD visit:   none scheduled  Date of Surgery: n/a   Insurance Primary/Secondary: MEDICARE / [employer]     # Auth Visits: n/a            Subjective: \"My knee are really bothering me and my back is painful.\"    Pain: 7-8/10      Objective: See flowsheet for details.      Assessment: Maintained upright positioning throughout session today to minimize vertigo symptoms.  Pt was able to complete all given exercises today with noted CV and muscle fatigue throughout session.  Pt cont to have difficulty with prolonged walking due to pain, as evidenced by his need to sit upon arriving to his session today.  Pt has been scheduled for a vestibular evaluation for next week.  Will cont to progress core stabilization and LE strengthening as able.      Goals:   (to be met in 10 visits)   Pt will improve transversus abdominis recruitment to perform proper isometric contraction without requiring verbal or tactile cuing to promote advancement of therex   Pt will demonstrate good understanding of proper posture and body mechanics to decrease pain and improve spinal safety   Pt will be able to amb >150 ft  without reports of LBP.  Pt will have decreased paraspinal mm tension to tolerate standing >10 minutes for community and home activities   Pt will demonstrate improved core strength to be able to perform prolonged sitting activities with <3/10 pain   Pt will be independent and compliant with comprehensive HEP to maintain progress achieved in PT     Plan: Will cont to progress POC as tolerated.  Date: 1/25/2024  TX#: 2/10 Date:                 TX#: 3/10 Date:                 TX#: 4/10 Date:                 TX#: 5/10 Date:   Tx#: 6/10   Therapeutic Exercises:  40 mins       NuStep (L5)  6 mins       Seated Ball Squeezes  20x5 secs       Seated Hip Abd  w/ Ring  20x5 secs       Sidestepping in //bars  X5 laps       Standing Hip Abd  2x10 ea       Stranding Hip Ext  2x10 ea       Standing Lumbar Ext  15x5 secs       Seated Hamstring Stretch  X2 mins ea         Charges:   Therapeutic Ex x3         Total Timed Treatment: 40 min    Total Treatment Time: 40 min

## 2024-01-29 ENCOUNTER — APPOINTMENT (OUTPATIENT)
Dept: PHYSICAL THERAPY | Age: 69
End: 2024-01-29
Attending: STUDENT IN AN ORGANIZED HEALTH CARE EDUCATION/TRAINING PROGRAM
Payer: MEDICARE

## 2024-01-29 ENCOUNTER — OFFICE VISIT (OUTPATIENT)
Dept: PHYSICAL THERAPY | Age: 69
End: 2024-01-29
Attending: INTERNAL MEDICINE
Payer: MEDICARE

## 2024-01-29 DIAGNOSIS — R42 VERTIGO: Primary | ICD-10-CM

## 2024-01-29 PROCEDURE — 97162 PT EVAL MOD COMPLEX 30 MIN: CPT | Performed by: PHYSICAL THERAPIST

## 2024-01-29 PROCEDURE — 97112 NEUROMUSCULAR REEDUCATION: CPT | Performed by: PHYSICAL THERAPIST

## 2024-01-29 NOTE — PROGRESS NOTES
VESTIBULAR EVALUATION:     Diagnosis:   Vertigo      Referring Provider: Ifrah  Date of Evaluation:    1/29/2024    Precautions:  None Next MD visit:   none scheduled  Date of Surgery: n/a     PATIENT SUMMARY   Arron Westfall is a 68 year old male who presents to therapy today with reports of sudden onset dizziness when lying on L side about 6 weeks ago. Currently pt avoids sleeping on L side. Pt states symptoms lasted for 4-5 hours and could not sit or lie down due to the symptoms.  History/onset of current condition: h/o BPPV two years ago, sudden onset when trying to get out of bed.    Symptoms with cough/sneeze or loud noise: No  Falls: No  Hx of migraines: No  Hx of vision issue: Yes: optokinetic sensitivity  Hx of hearing issues: none    Dizziness: Current 4/10, Best 0-1/10, Worst 7/10(avoids positions that brings on 7/10 dizziness)  Quality: occasional spinning, unbalanced (after sitting for a long time and getting up), dizziness after watching a moving for 20 mins  Frequency/Duration:  spinning lasts few seconds, dizziness can last few mins to few hours  Aggravates: Rolling, Tablet/smart phone, and Visual motion  Relieves: Closing eyes    Headache: N/A    Cervical pain: N/A    Dizziness Handicap Inventory (DHI): 70/100     Current functional limitations include limited driving, rolling in bed john paul to L side, getting up from sitting after a long time, lying supine, unable to watch more than 20 mins of TV.  Social history:  does not smoke, drink or use recreational drugs, lives with family in 2 level home.  Arron describes prior level of function as being independent without dizziness.  Pt goals include getting back to prior level of functioning.  Past medical history was reviewed with Arron. Significant findings include  has a past medical history of Abdominal hernia (8 yrs ago), Abdominal pain, Arthritis, Back problem, Bloating (2weeks ago), Constipation, Dizziness (2 weeks ago), Essential  hypertension, Hyperlipidemia, Hypovitaminosis D, Indigestion (12yrs ago), Irregular bowel habits, Pain in joints, Pain with bowel movements (sometimes), Sleep apnea (since18 yrs ago), Sleep disturbance, Sputum production (2weeks ago), Unspecified hypothyroidism, Wears glasses, and Wheezing.      ASSESSMENT  Arron presents to physical therapy evaluation with primary c/o dizziness. The results of the objective tests and measures show negative Stella cano Cuero and roll test but positive dynamic visual activity test. Pt also presents with some corrective saccades with gaze to left. VNG testing indicates hypofunctioning of R ear. Postural control will be tested at a later date. Functional deficits include but are not limited to difficulty rolling in bed, getting up from sitting after a long time and lying supine.  Signs and symptoms are consistent with diagnosis of dizziness, vertigo. Pt and PT discussed evaluation findings, pathology, POC and HEP.  Pt voiced understanding and performs HEP correctly. Skilled Physical Therapy is medically necessary to address the above impairments and reach functional goals. Thank you for referring Arron to [facility].    OBJECTIVE:   Physical Exam:  Posture/Observation: no deviations   Neuro Screen: Sensation: Normal     Coordination Testing:   Finger to Nose: NT   Pronation/supination: NT   Toe tapping: NT     Cervical spine ROM: WFL  Adverse neuro signs with ROM: yes no: no     Occulomotor & Vestibulo-Ocular Exam:  Spontaneous Nystagmus: room light: negative ;  fixation blocked: negative  Smooth Pursuit: negative  Saccades: normal with one corrective saccade toya.  Gaze Evoked Nystagmus:  room light: Negative; fixation blocked: overshooting on L with corrective saccade  Head Thrust: Negative  VOR screen:  normal    VOR Cancellation: Negative   Convergence: Negative  Cover/Uncover:  Negative  Cross Cover:  Negative  Head Shaking Nystagmus: Negative  Dynamic Visual Acuity:   positive 5 row degradation    Positional Testing:   [place]-Hallpike: R negative, L negative - dizziness reported with sitting up  but without nystagmus.  Roll Test (HC): negative     Postural Control: to be assessed at a later date.       Functional Mobility:   Gait: pt ambulates on level ground with  slight wider ROHIT .   Functional Gait Assessment (FGA): NT       Today's Treatment and Response: VOR I standing in busy background with focus on moving head equally right and left. Pt tends to move more to R than L.  Pt education was provided on exam findings, treatment diagnosis, treatment plan, expectations, and prognosis. Pt was also provided recommendations for possible dizziness after evaluation.   Patient was instructed in and issued a HEP for: VOR I    Charges: PT Eval Moderate Complexity, neuro re ed X 1      Total Timed Treatment: 15 min     Total Treatment Time: 60 min     Based on clinical rationale and outcome measures, this evaluation involved Moderate Complexity decision making due to 1-2 personal factors/comorbidities, 3 body structures involved/activity limitations, and unstable symptoms including  dizziness .  PLAN OF CARE:    Goals: (to be met in 8 visits)  Pt to be able roll in bed w/o symptoms of dizziness (8 visits).   Pt to be able to demonstrate quick turns 180 degrees w/o symptoms of dizziness (6 visits).  Pt to be able to do bed mobility and transfer w/o guarding or dizziness ( 5 visits).   Pt to be independent with HEP to self-manage symptoms and be able to return to prior level of function. (8 visits)    Frequency / Duration: Patient will be seen for 1-2 x/week or a total of 8 visits over a 90 day period. Treatment will include: home exercise program development and instruction, patient/family education, balance training, canalith repositioning maneuver, eye/head coordination exercises, sensory organization training, optokinetic stimulation , and exertion training.     Education or treatment  limitation: None   Rehab Potential: good     Patient/Family/Caregiver was advised of these findings, precautions, and treatment options and has agreed to actively participate in planning and for this course of care.     Thank you for your referral. Please co-sign or sign and return this letter via fax as soon as possible to [phone]. If you have any questions, please contact me at Dept: [phone]    Sincerely,  Electronically signed by therapist: Janet Moreno PT  Physician's certification required: Yes  I certify the need for these services furnished under this plan of treatment and while under my care.    X___________________________________________________ Date____________________    Certification From: 1/29/2024  To:4/28/2024

## 2024-01-31 ENCOUNTER — OFFICE VISIT (OUTPATIENT)
Dept: PHYSICAL THERAPY | Age: 69
End: 2024-01-31
Attending: STUDENT IN AN ORGANIZED HEALTH CARE EDUCATION/TRAINING PROGRAM
Payer: MEDICARE

## 2024-01-31 PROCEDURE — 97112 NEUROMUSCULAR REEDUCATION: CPT | Performed by: PHYSICAL THERAPIST

## 2024-01-31 NOTE — PROGRESS NOTES
Dx: Vertigo         Authorized # of Visits:  n/a         Next MD visit: none scheduled  Fall Risk: standard         Precautions: limited mobility due to toya knee pain and low back complaints.             Subjective: Pt states he has been doing the exercises and does not get any symptoms when doing the exercises.  Pain: 7-8/10 toya knees john paul from walking to the clinic from the parking lot.    Objective:       Assessment: Pt was educated extensively on principles of habituation and concept of graded activity to decrease fear of assuming positions that produced symptoms in past. Pt demonstrates some fear avoidance behaviors and was educated on way to manage symptoms with use of CP to decrease autonomic response when assuming positions that trigger symptoms. Pt is newly able to lie supine and tolerated 5 mins of lying on L side with a cold pack today.      Goals:   (to be met in 8 visits)  Pt to be able roll in bed w/o symptoms of dizziness (8 visits).   Pt to be able to demonstrate quick turns 180 degrees w/o symptoms of dizziness (6 visits).  Pt to be able to do bed mobility and transfer w/o guarding or dizziness ( 5 visits).   Pt to be independent with HEP to self-manage symptoms and be able to return to prior level of function. (8 visits)    Plan: Continue to progress with habituation exercises and tasks per tolerance.  Date: 1/31/2024   TX#: 2/8 Date:  TX#: 3/   Date:  TX#: 4/ Date:  TX#: 5/ Date:  TX#: 6/ Date:  TX#: 7/ Date:  TX#: 8/   NEURO RE ED 45  MINS         L side lying X 5 mins with CP with maximal encouragement to stay in side lying.         Seated trunk rotation ball pass CW, CCW X 20 ea with cues for centering for symtoms         Pt educated on centering techniques to decrease symptoms produced form exercises.         Grounding - forward lean on wall alt arm lift with EC X 10 ea         Grounding forward lean on wall alt leg lift with EC X 10 ea         Visual scan checkered board in sitting 1-40 X 1          Walking with horizontal head movements 20' X 4         Walking with vertical head movements 20' X 4          MINS                   MINS                                          Charges: Neuro re ed X 3       Total Timed Treatment: 45 min  Total Treatment Time: 45 min

## 2024-02-05 ENCOUNTER — OFFICE VISIT (OUTPATIENT)
Dept: PHYSICAL THERAPY | Age: 69
End: 2024-02-05
Attending: STUDENT IN AN ORGANIZED HEALTH CARE EDUCATION/TRAINING PROGRAM
Payer: MEDICARE

## 2024-02-05 PROCEDURE — 97110 THERAPEUTIC EXERCISES: CPT

## 2024-02-05 NOTE — PROGRESS NOTES
Diagnosis:   Lumbar stenosis with neurogenic claudication (M48.062)         Referring Provider: Echo  Date of Evaluation:    1/23/24    Precautions:    HTN and fall risk due to vertigo Next MD visit:   none scheduled  Date of Surgery: n/a   Insurance Primary/Secondary: MEDICARE / [employer] INS     # Auth Visits: n/a            Subjective: \"My lower feels better but my knees continue to hurt me and I have hard time walking \".    Pain: 6/10      Objective: See flowsheet for details.      Assessment: Maintained upright positioning throughout session today to minimize vertigo symptoms.  Added scapula strength ex's in seated position to further progress trunk postural stability with prolonged seated and standing activities . Patient was able to perform all new ex's without report of lower back pain .      Goals:   (to be met in 10 visits)   Pt will improve transversus abdominis recruitment to perform proper isometric contraction without requiring verbal or tactile cuing to promote advancement of therex   Pt will demonstrate good understanding of proper posture and body mechanics to decrease pain and improve spinal safety   Pt will be able to amb >150 ft  without reports of LBP.  Pt will have decreased paraspinal mm tension to tolerate standing >10 minutes for community and home activities   Pt will demonstrate improved core strength to be able to perform prolonged sitting activities with <3/10 pain   Pt will be independent and compliant with comprehensive HEP to maintain progress achieved in PT     Plan: Will cont to progress POC as tolerated.  Date: 1/25/2024  TX#: 2/10 Date: 2/5/24                TX#: 3/10 Date:                 TX#: 4/10 Date:                 TX#: 5/10 Date:   Tx#: 6/10   Therapeutic Exercises:  40 mins Therapeutic Ex :    40 min       NuStep (L5)  6 mins Nu step       7 min , level 5      Seated Ball Squeezes  20x5 secs Seated ball squeezes    25 x 5 sec   Hip abd with ring   25 x 5 sec       Seated Hip  Abd w/ Ring  20x5 secs Seated :  Rows with YTB     2 x 10   B sh ext with YTB     2 x 10   Alt marches     2 x 10   Thoracic ext with yellow ball      X 10      Sidestepping in //bars  X5 laps Sidestepping in // bars    X 6 laps       Standing Hip Abd  2x10 ea Standing hip abd   2 x 15       Stranding Hip Ext  2x10 ea Standing hip ext   2 x 15       Standing Lumbar Ext  15x5 secs Standing lumbar ext    2 x 10       Seated Hamstring Stretch  X2 mins ea Seated HS      X 2 min each         Charges:   Therapeutic Ex x3         Total Timed Treatment: 40 min    Total Treatment Time: 40 min   I have personally seen and examined this patient. I fully participated in the care of this patient. I have made amendments to the documentation where appropriate and otherwise agree with the history, physical exam, and plan as documented by the I have personally seen and examined this patient. I fully participated in the care of this patient. I have made amendments to the documentation where appropriate and otherwise agree with the history, physical exam, and plan as documented by the I have personally seen and examined this patient. I fully participated in the care of this patient. I have made amendments to the documentation where appropriate and otherwise agree with the history, physical exam, and plan as documented by the I have personally seen and examined this patient. I fully participated in the care of this patient. I have made amendments to the documentation where appropriate and otherwise agree with the history, physical exam, and plan as documented by the I have personally seen and examined this patient. I fully participated in the care of this patient. I have made amendments to the documentation where appropriate and otherwise agree with the history, physical exam, and plan as documented by the I have personally seen and examined this patient. I fully participated in the care of this patient. I have made amendments to the documentation where appropriate and otherwise agree with the history, physical exam, and plan as documented by the I have personally seen and examined this patient. I fully participated in the care of this patient. I have made amendments to the documentation where appropriate and otherwise agree with the history, physical exam, and plan as documented by the I have personally seen and examined this patient. I fully participated in the care of this patient. I have made amendments to the documentation where appropriate and otherwise agree with the history, physical exam, and plan as documented by the I have personally seen and examined this patient. I fully participated in the care of this patient. I have made amendments to the documentation where appropriate and otherwise agree with the history, physical exam, and plan as documented by the I have personally seen and examined this patient. I fully participated in the care of this patient. I have made amendments to the documentation where appropriate and otherwise agree with the history, physical exam, and plan as documented by the I have personally seen and examined this patient. I fully participated in the care of this patient. I have made amendments to the documentation where appropriate and otherwise agree with the history, physical exam, and plan as documented by the I have personally seen and examined this patient. I fully participated in the care of this patient. I have made amendments to the documentation where appropriate and otherwise agree with the history, physical exam, and plan as documented by the I have personally seen and examined this patient. I fully participated in the care of this patient. I have made amendments to the documentation where appropriate and otherwise agree with the history, physical exam, and plan as documented by the I have personally seen and examined this patient. I fully participated in the care of this patient. I have made amendments to the documentation where appropriate and otherwise agree with the history, physical exam, and plan as documented by the

## 2024-02-07 ENCOUNTER — APPOINTMENT (OUTPATIENT)
Dept: PHYSICAL THERAPY | Age: 69
End: 2024-02-07
Attending: STUDENT IN AN ORGANIZED HEALTH CARE EDUCATION/TRAINING PROGRAM
Payer: MEDICARE

## 2024-02-07 PROCEDURE — 97110 THERAPEUTIC EXERCISES: CPT

## 2024-02-07 NOTE — PROGRESS NOTES
Diagnosis:   Lumbar stenosis with neurogenic claudication (M48.062)         Referring Provider: Echo  Date of Evaluation:    1/23/24    Precautions:    HTN and fall risk due to vertigo Next MD visit:   none scheduled  Date of Surgery: n/a   Insurance Primary/Secondary: MEDICARE / [employer] INS     # Auth Visits: n/a            Subjective: \"My lower back feel very good today but me knees hurt me a lot today \".    Pain: 6/10      Objective: See flowsheet for details.      Assessment: Maintained upright positioning throughout session today to minimize vertigo symptoms.  Increased reps on some of the hip strength ex's to further progress lower extremity stability with prolonged standing and walking . Patient demonstrates limited tolerance for standing activities due to c/o of right knee pain .      Goals:   (to be met in 10 visits)   Pt will improve transversus abdominis recruitment to perform proper isometric contraction without requiring verbal or tactile cuing to promote advancement of therex   Pt will demonstrate good understanding of proper posture and body mechanics to decrease pain and improve spinal safety   Pt will be able to amb >150 ft  without reports of LBP.  Pt will have decreased paraspinal mm tension to tolerate standing >10 minutes for community and home activities   Pt will demonstrate improved core strength to be able to perform prolonged sitting activities with <3/10 pain   Pt will be independent and compliant with comprehensive HEP to maintain progress achieved in PT     Plan: Will cont to progress POC as tolerated.  Date: 1/25/2024  TX#: 2/10 Date: 2/5/24                TX#: 3/10 Date:   2/07/24              TX#: 4/10 Date:                 TX#: 5/10 Date:   Tx#: 6/10   Therapeutic Exercises:  40 mins Therapeutic Ex :    40 min  Therapeutic Ex :   40 min      NuStep (L5)  6 mins Nu step       7 min , level 5 NuStep x 7 min , level 5     Seated Ball Squeezes  20x5 secs Seated ball squeezes    25 x 5 sec    Hip abd with ring   25 x 5 sec  Seated ball squeezes    2 x 15 with 5 sec hold   Hip abd with rung    2 x 15 with 5 sec hold      Seated Hip Abd w/ Ring  20x5 secs Seated :  Rows with YTB     2 x 10   B sh ext with YTB     2 x 10   Alt marches     2 x 10   Thoracic ext with yellow ball      X 10 Seated :  Rows with YTB   2 x 15   B sh ext with YTB   2 x 15   B horizontal abd with YTB     1 x 10       Sidestepping in //bars  X5 laps Sidestepping in // bars    X 6 laps  Sidestepping in // bars   X 6 laps      Standing Hip Abd  2x10 ea Standing hip abd   2 x 15  Standing hip abd   2 x 15      Stranding Hip Ext  2x10 ea Standing hip ext   2 x 15  Standing hip ext   2 x 15      Standing Lumbar Ext  15x5 secs Standing lumbar ext    2 x 10  Standing lumbar ext    2 x 15      Seated Hamstring Stretch  X2 mins ea Seated HS      X 2 min each  Seated HS stretch     2 min each        Charges:   Therapeutic Ex x3         Total Timed Treatment: 40 min    Total Treatment Time: 40 min

## 2024-02-09 ENCOUNTER — APPOINTMENT (OUTPATIENT)
Dept: PHYSICAL THERAPY | Age: 69
End: 2024-02-09
Attending: STUDENT IN AN ORGANIZED HEALTH CARE EDUCATION/TRAINING PROGRAM
Payer: MEDICARE

## 2024-02-12 ENCOUNTER — OFFICE VISIT (OUTPATIENT)
Dept: PHYSICAL THERAPY | Age: 69
End: 2024-02-12
Attending: STUDENT IN AN ORGANIZED HEALTH CARE EDUCATION/TRAINING PROGRAM
Payer: MEDICARE

## 2024-02-12 PROCEDURE — 97110 THERAPEUTIC EXERCISES: CPT

## 2024-02-12 NOTE — PROGRESS NOTES
Diagnosis:   Lumbar stenosis with neurogenic claudication (M48.062)         Referring Provider: Echo  Date of Evaluation:    1/23/24    Precautions:    HTN and fall risk due to vertigo Next MD visit:   none scheduled  Date of Surgery: n/a   Insurance Primary/Secondary: MEDICARE / [employer] INS     # Auth Visits: n/a            Subjective: \" I was able to walk from parking lot to the gym ( 15 min ) without lower back pain but with severe knee pain \".\" My right knee  hurts me a lot and it prevents me from walking longer distance but my lower back is feeling fine \".    Pain: 6/10      Objective: See flowsheet for details.      Assessment: Maintained upright positioning throughout session today to minimize vertigo symptoms.  Increased reps on some of the hip strength ex's to further progress lower extremity stability with prolonged standing and walking .  Increased resistance on  scapula strength ex's to further progress postural stability with gait and with prolonged standing .Patient  continue to demonstrate limited tolerance for standing activities due to c/o of right knee pain .      Goals:   (to be met in 10 visits)   Pt will improve transversus abdominis recruitment to perform proper isometric contraction without requiring verbal or tactile cuing to promote advancement of therex   Pt will demonstrate good understanding of proper posture and body mechanics to decrease pain and improve spinal safety   Pt will be able to amb >150 ft  without reports of LBP.  Pt will have decreased paraspinal mm tension to tolerate standing >10 minutes for community and home activities   Pt will demonstrate improved core strength to be able to perform prolonged sitting activities with <3/10 pain   Pt will be independent and compliant with comprehensive HEP to maintain progress achieved in PT     Plan: Will cont to progress POC as tolerated.  Date: 1/25/2024  TX#: 2/10 Date: 2/5/24                TX#: 3/10 Date:   2/07/24              TX#:  4/10 Date:  2/12/24               TX#: 5/10 Date:   Tx#: 6/10   Therapeutic Exercises:  40 mins Therapeutic Ex :    40 min  Therapeutic Ex :   40 min  Therapeutic Ex :  40 min     NuStep (L5)  6 mins Nu step       7 min , level 5 NuStep x 7 min , level 5 Nu step x 6 min , level 5    Seated Ball Squeezes  20x5 secs Seated ball squeezes    25 x 5 sec   Hip abd with ring   25 x 5 sec  Seated ball squeezes    2 x 15 with 5 sec hold   Hip abd with rung    2 x 15 with 5 sec hold  Seated ball squeezes 2 x 15   Hip abd with fitness ring 2 x 15     Seated Hip Abd w/ Ring  20x5 secs Seated :  Rows with YTB     2 x 10   B sh ext with YTB     2 x 10   Alt marches     2 x 10   Thoracic ext with yellow ball      X 10 Seated :  Rows with YTB   2 x 15   B sh ext with YTB   2 x 15   B horizontal abd with YTB     1 x 10   Seated :  Rows with RTB   2 X 10  B sh ext with RTB   2 X 10  B horizontal abd with RTB   1 x 10    Sidestepping in //bars  X5 laps Sidestepping in // bars    X 6 laps  Sidestepping in // bars   X 6 laps  Sidestepping in // bars   X 6 laps     Standing Hip Abd  2x10 ea Standing hip abd   2 x 15  Standing hip abd   2 x 15  Standing hip abd   2 x 15     Stranding Hip Ext  2x10 ea Standing hip ext   2 x 15  Standing hip ext   2 x 15  Standing hip ext   2 x 15     Standing Lumbar Ext  15x5 secs Standing lumbar ext    2 x 10  Standing lumbar ext    2 x 15  Standing lumbar ext   2 x 15     Seated Hamstring Stretch  X2 mins ea Seated HS      X 2 min each  Seated HS stretch     2 min each  Seated HS stretch   2 min each       Charges:   Therapeutic Ex x3         Total Timed Treatment: 40 min    Total Treatment Time: 40 min

## 2024-02-14 ENCOUNTER — OFFICE VISIT (OUTPATIENT)
Dept: PHYSICAL THERAPY | Age: 69
End: 2024-02-14
Attending: STUDENT IN AN ORGANIZED HEALTH CARE EDUCATION/TRAINING PROGRAM
Payer: MEDICARE

## 2024-02-14 PROCEDURE — 97110 THERAPEUTIC EXERCISES: CPT

## 2024-02-19 ENCOUNTER — APPOINTMENT (OUTPATIENT)
Dept: PHYSICAL THERAPY | Age: 69
End: 2024-02-19
Attending: STUDENT IN AN ORGANIZED HEALTH CARE EDUCATION/TRAINING PROGRAM
Payer: MEDICARE

## 2024-02-21 ENCOUNTER — APPOINTMENT (OUTPATIENT)
Dept: PHYSICAL THERAPY | Age: 69
End: 2024-02-21
Attending: STUDENT IN AN ORGANIZED HEALTH CARE EDUCATION/TRAINING PROGRAM
Payer: MEDICARE

## 2024-02-21 PROCEDURE — 97112 NEUROMUSCULAR REEDUCATION: CPT | Performed by: PHYSICAL THERAPIST

## 2024-02-23 ENCOUNTER — APPOINTMENT (OUTPATIENT)
Dept: PHYSICAL THERAPY | Age: 69
End: 2024-02-23
Attending: STUDENT IN AN ORGANIZED HEALTH CARE EDUCATION/TRAINING PROGRAM
Payer: MEDICARE

## 2024-02-28 NOTE — PROGRESS NOTES
Dx: Vertigo         Authorized # of Visits:  n/a         Next MD visit: none scheduled  Fall Risk: standard         Precautions: limited mobility due to toya knee pain and low back complaints.             Subjective: Pt continues to have some sense of feeling \"off\". However, no spinning noted.  Pain: 7/10 knee pain with walking and being on feet a long time.  Objective:       Assessment: Pt was progressed with balance exercises in static and dynamic positions. Pt was challenged by standing in narrow ROHIT with increased sway. Pt is very deconditioned and required rest breaks due to fatigue and LE discomfort with balance exercises.    Goals:   (to be met in 8 visits)  Pt to be able roll in bed w/o symptoms of dizziness (8 visits).   Pt to be able to demonstrate quick turns 180 degrees w/o symptoms of dizziness (6 visits).  Pt to be able to do bed mobility and transfer w/o guarding or dizziness ( 5 visits).   Pt to be independent with HEP to self-manage symptoms and be able to return to prior level of function. (8 visits)    Plan: Continue to progress with habituation exercises and tasks per tolerance.  Date: 1/31/2024   TX#: 2/8 Date: 2/21/2023  TX#: 3/8   Date:  TX#: 4/8 Date:  TX#: 5/ Date:  TX#: 6/ Date:  TX#: 7/ Date:  TX#: 8/   NEURO RE ED 45  MINS NEURO RE ED 40  MINS        L side lying X 5 mins with CP with maximal encouragement to stay in side lying. Walking on TM looking at moving target X 5 mins  VOR I in busy background horizontal head movts X 5 sets          Seated trunk rotation ball pass CW, CCW X 20 ea with cues for centering for symtoms Visual scanning in standing1- 40 X 1  Balance Feet tog EO 30 sec X 3        Pt educated on centering techniques to decrease symptoms produced form exercises. Balance Feet tog EC 30 sec X 3  Walking with horizontal head movement 20' X 4        Grounding - forward lean on wall alt arm lift with EC X 10 ea Walking with vertical head movement 20'   X 4        Grounding  forward lean on wall alt leg lift with EC X 10 ea         Visual scan checkered board in sitting 1-40 X 1         Walking with horizontal head movements 20' X 4         Walking with vertical head movements 20' X 4          MINS                   MINS                                          Charges: Neuro re ed X 3       Total Timed Treatment: 40 min  Total Treatment Time: 40 min

## 2024-03-20 ENCOUNTER — HOSPITAL ENCOUNTER (OUTPATIENT)
Dept: GENERAL RADIOLOGY | Age: 69
Discharge: HOME | End: 2024-03-20
Attending: PHYSICIAN ASSISTANT
Payer: MEDICARE

## 2024-03-20 ENCOUNTER — OFFICE VISIT (OUTPATIENT)
Dept: ORTHOPEDICS CLINIC | Facility: CLINIC | Age: 69
End: 2024-03-20
Payer: MEDICARE

## 2024-03-20 VITALS — HEIGHT: 66 IN | WEIGHT: 253 LBS | BODY MASS INDEX: 40.66 KG/M2

## 2024-03-20 DIAGNOSIS — M17.0 PRIMARY OSTEOARTHRITIS OF BOTH KNEES: ICD-10-CM

## 2024-03-20 DIAGNOSIS — M17.0 PRIMARY OSTEOARTHRITIS OF BOTH KNEES: Primary | ICD-10-CM

## 2024-03-20 PROCEDURE — 73564 X-RAY EXAM KNEE 4 OR MORE: CPT | Performed by: PHYSICIAN ASSISTANT

## 2024-03-20 RX ORDER — LEVOCETIRIZINE DIHYDROCHLORIDE 5 MG/1
5 TABLET, FILM COATED ORAL EVERY EVENING
COMMUNITY
Start: 2024-01-18

## 2024-03-20 RX ORDER — EVOLOCUMAB 140 MG/ML
INJECTION, SOLUTION SUBCUTANEOUS
COMMUNITY
Start: 2024-01-19

## 2024-03-20 RX ORDER — TRIAMCINOLONE ACETONIDE 40 MG/ML
80 INJECTION, SUSPENSION INTRA-ARTICULAR; INTRAMUSCULAR ONCE
Status: SHIPPED | OUTPATIENT
Start: 2024-03-20

## 2024-03-20 NOTE — PROCEDURES
After informed consent, the patient's right and left knees were marked, locally anesthetized with skin refrigerant, prepped with topical antiseptic, and injected with a mixture of 1mL 40mg/mL Kenalog, 2mL 1% lidocaine and 2mL 0.5% marcaine through the inferolateral portal.  A band-aid was applied.  The patient tolerated the procedure well.    Alec Curran PA-C  [facility] Orthopedic Surgery

## 2024-03-20 NOTE — PROGRESS NOTES
[facility] Progress Note    Subjective: Patient returns to clinic after last being seen by me on 12/4/2023 for bilateral Synvisc 1 injections.  He reports an increase of pain after the injections and no significant relief.  He previously had received bilateral intra-articular corticosteroid injections on 8/14/2023 which she reports did offer at least 3 months of relief.  He continues to experience pain primarily in the left medial knee but also in the right knee to a lesser extent.  He finds doing daily activities such as walking in grocery stores has become significantly cumbersome.    Objective: Patient seated comfortably in the exam chair.  Ambulates with a slight limp favoring the left leg.  No effusion about the knee but there is bilateral lower extremity pitting edema.  No redness or warmth of the bilateral knees.    Imaging: Repeat radiographs of both knees obtained today    Assessment/Plan: Discussed with the patient that given the severity of the arthritis in his knees that I would not recommend obtaining an MRI study as this is unlikely to affect the course of treatment.  I discussed the surgical recommendation would still be knee replacement surgery, however this is an elective operation that is reserved for when nonsurgical treatment options fail.  He did previously received least 3 months of relief from the steroid injections administered in August 2023 and I recommended repeating these injections.  The patient expressed interest in this and bilateral knee steroid injections were administered in clinic today.  I discussed that he can repeat these as often as every 3 months as can as they continue to offer relief.  We could consider Zilretta injection in the future if the steroids fail to offer significant relief. The patient's questions were sought and answered satisfactorily.  He is happy with the plan and will follow as advised.        Alec Curran PA-C  [facility] Orthopedic  Surgery    This note was dictated using Dragon software.  While it was briefly proofread prior to completion, some grammatical, spelling, and word choice errors due to dictation may still occur.

## 2024-05-06 ENCOUNTER — OFFICE VISIT (OUTPATIENT)
Dept: INTERNAL MEDICINE CLINIC | Facility: CLINIC | Age: 69
End: 2024-05-06
Payer: MEDICARE

## 2024-05-06 VITALS
SYSTOLIC BLOOD PRESSURE: 140 MMHG | HEIGHT: 66 IN | RESPIRATION RATE: 16 BRPM | WEIGHT: 253.63 LBS | OXYGEN SATURATION: 97 % | TEMPERATURE: 99 F | DIASTOLIC BLOOD PRESSURE: 60 MMHG | HEART RATE: 84 BPM | BODY MASS INDEX: 40.76 KG/M2

## 2024-05-06 DIAGNOSIS — R42 DIZZINESS: ICD-10-CM

## 2024-05-06 DIAGNOSIS — Z00.00 WELLNESS EXAMINATION: Primary | ICD-10-CM

## 2024-05-06 DIAGNOSIS — R73.03 PREDIABETES: ICD-10-CM

## 2024-05-06 DIAGNOSIS — I10 ESSENTIAL HYPERTENSION: ICD-10-CM

## 2024-05-06 DIAGNOSIS — R60.0 LOWER EXTREMITY EDEMA: ICD-10-CM

## 2024-05-06 DIAGNOSIS — E03.9 HYPOTHYROIDISM, UNSPECIFIED TYPE: ICD-10-CM

## 2024-05-06 DIAGNOSIS — E78.2 MIXED HYPERLIPIDEMIA: ICD-10-CM

## 2024-05-06 DIAGNOSIS — Z12.5 SCREENING FOR PROSTATE CANCER: ICD-10-CM

## 2024-05-06 DIAGNOSIS — R06.09 DYSPNEA ON EXERTION: ICD-10-CM

## 2024-05-06 DIAGNOSIS — K21.9 GASTROESOPHAGEAL REFLUX DISEASE WITHOUT ESOPHAGITIS: ICD-10-CM

## 2024-05-06 PROCEDURE — 99214 OFFICE O/P EST MOD 30 MIN: CPT | Performed by: INTERNAL MEDICINE

## 2024-05-06 PROCEDURE — G0439 PPPS, SUBSEQ VISIT: HCPCS | Performed by: INTERNAL MEDICINE

## 2024-05-06 RX ORDER — LOSARTAN POTASSIUM 50 MG/1
50 TABLET ORAL DAILY
Qty: 30 TABLET | Refills: 0 | Status: SHIPPED | OUTPATIENT
Start: 2024-05-06

## 2024-05-06 RX ORDER — FAMOTIDINE 20 MG/1
20 TABLET, FILM COATED ORAL 2 TIMES DAILY
Qty: 180 TABLET | Refills: 3 | Status: SHIPPED | OUTPATIENT
Start: 2024-05-06

## 2024-05-06 NOTE — PROGRESS NOTES
Subjective:   Arron Westfall is a 68 year old male who presents for a Medicare Subsequent Annual Wellness visit (Pt already had Initial Annual Wellness) and scheduled follow up of multiple significant but stable problems and acute complicated new problem.     Dizziness   Felt like he \"lost my senses\" for a few seconds and felt like he was going to fall  Happens when he gets up from laying or standing  He has dizziness with turning his head and when he turns over in bed.   He saw ENT - Dr. Adame. Symptoms had improved with vestibular therapy.   He had VNG which worsened his vertigo symptoms     HTN - BP high at home     Leg swelling is worse, especially the left leg     Knees are painful; has done cortisone shots with Dr. Abbott     He previously had bloating/gas with spicy foods   Symptoms happening more often and is taking famotidine more than 3-4 times a week     Breathing is worse   He can only walk a block or two   He is symptomatic going up stairs     He sees Dr. Medina cardiology   Last stress test was 2 years ago       HL - has been on repatha previously; not taking currently     He notes his symptoms are worsening since last year     He has postnasal drip; worse for the past week  He takes allergy medications otc. He had been taking a nasal steroid       History/Other:   Fall Risk Assessment:   He has been screened for Falls and is High Risk. Fall Prevention information provided to patient in After Visit Summary.    Do you feel unsteady when standing or walking?: Yes  Do you worry about falling?: Yes  Have you fallen in the past year?: No     Cognitive Assessment:   Abnormal  What day of the week is this?: Correct  What month is it?: Correct  What year is it?: Correct  Recall \"Ball\": Correct  Recall \"Flag\": Incorrect  Recall \"Tree\": Correct    Functional Ability/Status:   Arron Westfall has some abnormal functions as listed below:  He has Meal Preparation difficulties based on screening of functional  status.He has difficulties Managing Money/Bills based on screening of functional status.He has difficulties Shopping for Groceries based on screening of functional status. He has Vision problems based on screening of functional status. He has Walking problems based on screening of functional status.       Depression Screening (PHQ-2/PHQ-9): PHQ-2 SCORE: 0  , done 5/6/2024         Advanced Directives:   He does have a Living Will but we do NOT have it on file in Epic.    He does NOT have a Power of  for Health Care. [Do you have a healthcare power of ?: No]  Patient has Advance Care Planning documents but we do not have a copy in EMR. Discussed Advanced Care Planning with patient and instructed patient to get our office a copy to be scanned into EMR.      Patient Active Problem List   Diagnosis    Hypothyroidism    Hypovitaminosis D    Pre-diabetes    Incomplete right bundle branch block    Diastolic dysfunction    Diverticulosis of large intestine    Internal hemorrhoids    Shortness of breath    Coronary artery disease involving native coronary artery of native heart without angina pectoris    Vitamin B 12 deficiency    MICAH (obstructive sleep apnea)    Trigger ring finger of right hand    Benign essential hypertension    Morbid obesity with BMI of 40.0-44.9, adult (HCC)    Bilateral primary osteoarthritis of knee    Mixed hyperlipidemia    Iron deficiency    Bilateral carotid artery stenosis     Allergies:  He is allergic to 1-methyl 2-pyrrolidone, atorvastatin, statins, and seasonal.    Current Medications:  Outpatient Medications Marked as Taking for the 5/6/24 encounter (Office Visit) with Earline Rg MD   Medication Sig    losartan 50 MG Oral Tab Take 1 tablet (50 mg total) by mouth daily.    famotidine 20 MG Oral Tab Take 1 tablet (20 mg total) by mouth 2 (two) times daily.    levocetirizine 5 MG Oral Tab Take 1 tablet (5 mg total) by mouth every evening.    levothyroxine 137 MCG Oral Tab  Take 137 mcg by mouth before breakfast.    amLODIPine 10 MG Oral Tab Take 1 tablet (10 mg total) by mouth daily.    MOMETASONE FUROATE 0.1 % External Cream APPLY ONE TIME A DAY    TO AFFECTED AREA FOR 14 DAYS    aspirin 81 MG Oral Tab Take 1 tablet (81 mg total) by mouth daily.     Current Facility-Administered Medications for the 5/6/24 encounter (Office Visit) with Earline Rg MD   Medication    triamcinolone acetonide (Kenalog-40) 40 MG/ML injection 80 mg       Medical History:  He  has a past medical history of Abdominal hernia (8 yrs ago), Abdominal pain, Arthritis, Back problem, Bloating (2weeks ago), Constipation, Dizziness (2 weeks ago), Essential hypertension, Flatulence/gas pain/belching (2 weeks ago), Heartburn (12yrs ago), Hemorrhoids (since18 yrs ago), Hyperlipidemia, Hypovitaminosis D, Indigestion (12yrs ago), Irregular bowel habits, Pain in joints, Pain with bowel movements (sometimes), Sleep apnea (since18 yrs ago), Sleep disturbance, Sputum production (2weeks ago), Unspecified hypothyroidism, Wears glasses, and Wheezing.  Surgical History:  He  has a past surgical history that includes hemorrhoidectomy; colonoscopy; hernia surgery; and colonoscopy (10 yrs ago).   Family History:  His family history includes Diabetes in his brother, father, mother, and sister; Heart Attack in his mother; Heart Disorder (age of onset: 73) in his brother; Other in his brother.  Social History:  He  reports that he has never smoked. He has never used smokeless tobacco. He reports that he does not drink alcohol and does not use drugs.    Tobacco:  He has never smoked tobacco.    CAGE Alcohol Screen:   CAGE screening score of 0 on 5/6/2024, showing low risk of alcohol abuse.      Patient Care Team:  Earline Rg MD as PCP - General (Internal Medicine)  Gina Monroy APRN (Nurse Practitioner)  Anupam Means PT as Physical Therapist  Essence Cisneros PA as Physician Assistant (Physician Assistant)  Alvin  Evelyn OWENS, PT as Physical Therapist  Deyanira Luna MD (GASTROENTEROLOGY)  Alec Curran PA-C (Physician Assistant)  Benito Abbott MD (Surgery, Orthopedic Adult Reconstructive)  Annabelle Leyva, PT as Physical Therapist  Bonnie Nayak PTA as Physical Therapy Assistant (Physical Therapy)  Janet Moreno, PT as Physical Therapist (Physical Therapy)    Review of Systems   Constitutional:  Negative for fever.   Eyes:  Negative for visual disturbance.   Respiratory:  Negative for shortness of breath.    Cardiovascular:  Negative for chest pain.   Gastrointestinal:  Negative for constipation.   Neurological: Negative.    Hematological: Negative.    Psychiatric/Behavioral: Negative.           Objective:   Physical Exam  Vitals reviewed.   Constitutional:       General: He is not in acute distress.     Appearance: He is well-developed.   HENT:      Head: Normocephalic and atraumatic.      Right Ear: Tympanic membrane normal.      Left Ear: Tympanic membrane normal.   Eyes:      Conjunctiva/sclera: Conjunctivae normal.   Cardiovascular:      Rate and Rhythm: Normal rate and regular rhythm.      Heart sounds: Normal heart sounds.   Pulmonary:      Effort: Pulmonary effort is normal.      Breath sounds: Normal breath sounds.   Musculoskeletal:      Cervical back: Neck supple.   Skin:     General: Skin is warm and dry.   Neurological:      General: No focal deficit present.      Mental Status: He is alert.   Psychiatric:         Mood and Affect: Mood normal.         /60 (BP Location: Right arm, Patient Position: Sitting, Cuff Size: large)   Pulse 84   Temp 99 °F (37.2 °C) (Temporal)   Resp 16   Ht 5' 6\" (1.676 m)   Wt 253 lb 9.6 oz (115 kg)   SpO2 97%   BMI 40.93 kg/m²  Estimated body mass index is 40.93 kg/m² as calculated from the following:    Height as of this encounter: 5' 6\" (1.676 m).    Weight as of this encounter: 253 lb 9.6 oz (115 kg).    Medicare Hearing Assessment:    Hearing Screening    Time taken: 5/6/2024 11:18 AM  Entry User: Josi Baxter MA  Screening Method: Finger Rub  Finger Rub Result: Fail       Orthostatic BP  Laying 128/70  Sitting 130/72  Standing 132/60      Visual Acuity:   Right Eye Visual Acuity: Corrected Right Eye Chart Acuity: 20/30   Left Eye Visual Acuity: Corrected Left Eye Chart Acuity: 20/30   Both Eyes Visual Acuity: Corrected Both Eyes Chart Acuity: 20/30   Able To Tolerate Visual Acuity: Yes        Assessment & Plan:   Arron Westfall is a 68 year old male who presents for a Medicare Assessment.     1. Wellness examination (Primary)  -colonoscopy due in 2030  -declines shingrix and pna vaccines  2. Essential hypertension -Stop amlodipine due to LE edema and start losartan 50mg once a day. Let me know in one week how your leg swelling is doing and also how blood pressure is doing - call my nurse or send a [employer] message   -     Losartan Potassium; Take 1 tablet (50 mg total) by mouth daily.  Dispense: 30 tablet; Refill: 0  3. Hypothyroidism, unspecified type - cont levothyroxine; check labs  -     TSH W Reflex To Free T4; Future; Expected date: 05/06/2024  4. Mixed hyperlipidemia - cont Repatha; per cardiology   -     Comp Metabolic Panel (14); Future; Expected date: 05/06/2024  -     Lipid Panel; Future; Expected date: 05/06/2024  5. Prediabetes - cont low carb diet; check A1c   -     Hemoglobin A1C; Future; Expected date: 05/06/2024  6. Screening for prostate cancer  -     PSA Total, Screen; Future; Expected date: 05/06/2024  7. Gastroesophageal reflux disease without esophagitis - cont famotidine   -     Famotidine; Take 1 tablet (20 mg total) by mouth 2 (two) times daily.  Dispense: 180 tablet; Refill: 3  8. Dizziness - Consider follow up again with Dr. Adame or vestibular therapy for vertigo/dizziness when turning over in bed  -     CBC With Differential With Platelet; Future; Expected date: 05/06/2024  9. Lower extremity edema - stop  amlodipine as above.   10. Dyspnea on exertion - Call to schedule appointment with Dr. Medina cardiology to talk about difficulty breathing with exertion. Likely need another stress test  If stress test is normal, we would then order PFTs    The patient indicates understanding of these issues and agrees to the plan.  Reinforced healthy diet, lifestyle, and exercise.      Return in about 6 weeks (around 6/17/2024).     Earline Rg MD, 5/6/2024     Patient aware of both preventative/wellness exam as well as problem oriented office visit.     Supplementary Documentation:   General Health:  In the past six months, have you lost more than 10 pounds without trying?: 2 - No  Has your appetite been poor?: No  Type of Diet: Low Salt  How does the patient maintain a good energy level?: Daily Walks  How would you describe your daily physical activity?: Light  How would you describe your current health state?: Fair  How do you maintain positive mental well-being?: Visiting Family  On a scale of 0 to 10, with 0 being no pain and 10 being severe pain, what is your pain level?: 4 - (Moderate)  In the past six months, have you experienced urine leakage?: 1-Yes  At any time do you feel concerned for the safety/well-being of yourself and/or your children, in your home or elsewhere?: No  Have you had any immunizations at another office such as Influenza, Hepatitis B, Tetanus, or Pneumococcal?: No        Arron Westfall's SCREENING SCHEDULE   Tests on this list are recommended by your physician but may not be covered, or covered at this frequency, by your insurer.   Please check with your insurance carrier before scheduling to verify coverage.   PREVENTATIVE SERVICES FREQUENCY &  COVERAGE DETAILS LAST COMPLETION DATE   Diabetes Screening    Fasting Blood Sugar / Glucose    One screening every 12 months if never tested or if previously tested but not diagnosed with pre-diabetes   One screening every 6 months if diagnosed with  pre-diabetes Lab Results   Component Value Date     (H) 05/07/2024        Cardiovascular Disease Screening    Lipid Panel  Cholesterol  Lipoprotein (HDL)  Triglycerides Covered every 5 years for all Medicare beneficiaries without apparent signs or symptoms of cardiovascular disease Lab Results   Component Value Date    CHOLEST 193 05/07/2024    HDL 45 05/07/2024     (H) 05/07/2024    TRIG 93 05/07/2024         Electrocardiogram (EKG)   Covered if needed at Welcome to Medicare, and non-screening if indicated for medical reasons 02/27/2021      Ultrasound Screening for Abdominal Aortic Aneurysm (AAA) Covered once in a lifetime for one of the following risk factors    Men who are 65-75 years old and have ever smoked    Anyone with a family history -     Colorectal Cancer Screening  Covered for ages 50-85; only need ONE of the following:    Colonoscopy   Covered every 10 years    Covered every 2 years if patient is at high risk or previous colonoscopy was abnormal 01/12/2023    Health Maintenance   Topic Date Due    Colorectal Cancer Screening  01/12/2030       Flexible Sigmoidoscopy   Covered every 4 years -    Fecal Occult Blood Test Covered annually -   Prostate Cancer Screening    Prostate-Specific Antigen (PSA) Annually Lab Results   Component Value Date    PSA 0.873 08/08/2017     Health Maintenance   Topic Date Due    PSA  05/07/2026      Immunizations    Influenza Covered once per flu season  Please get every year -  No recommendations at this time    Pneumococcal Each vaccine ([id number] & [id number]) covered once after 65 Prevnar 13: -    [id number]: -     No recommendations at this time    Hepatitis B One screening covered for patients with certain risk factors   -  No recommendations at this time    Tetanus Toxoid Not covered by Medicare Part B unless medically necessary (cut with metal); may be covered with your pharmacy prescription benefits -    Tetanus, Diptheria and Pertusis TD and TDaP  Not covered by Medicare Part B -  No recommendations at this time    Zoster Not covered by Medicare Part B; may be covered with your pharmacy  prescription benefits -  No recommendations at this time     Annual Monitoring of Persistent Medications (ACE/ARB, digoxin diuretics, anticonvulsants)    Potassium Annually Lab Results   Component Value Date    K 4.1 05/07/2024         Creatinine   Annually Lab Results   Component Value Date    CREATSERUM 1.25 05/07/2024         BUN Annually Lab Results   Component Value Date    BUN 18 05/07/2024       Drug Serum Conc Annually No results found for: \"DIGOXIN\", \"DIG\", \"VALP\"

## 2024-05-06 NOTE — PATIENT INSTRUCTIONS
Restart nasal steroid     Consider follow up again with Dr. Adame or vestibular therapy for vertigo/dizziness when turning over in bed    Stop amlodipine and start losartan 50mg once a day.   Let me know in one week how your leg swelling is doing and also how blood pressure is doing - call my nurse or send a [employer] message     Blood work     Call to schedule appointment with Dr. Medina cardiology to talk about difficulty breathing with exertion. Likely need another stress test    If stress test is normal, we would then order PFTs, lung function testing.     Consider adding in probiotic to help with digestion symptoms such as Florajen refrigerated probiotic

## 2024-05-07 ENCOUNTER — LAB ENCOUNTER (OUTPATIENT)
Dept: LAB | Age: 69
End: 2024-05-07
Attending: INTERNAL MEDICINE
Payer: MEDICARE

## 2024-05-07 ENCOUNTER — PATIENT MESSAGE (OUTPATIENT)
Dept: INTERNAL MEDICINE CLINIC | Facility: CLINIC | Age: 69
End: 2024-05-07

## 2024-05-07 DIAGNOSIS — E78.2 MIXED HYPERLIPIDEMIA: ICD-10-CM

## 2024-05-07 DIAGNOSIS — Z12.5 SCREENING FOR PROSTATE CANCER: ICD-10-CM

## 2024-05-07 DIAGNOSIS — R73.03 PREDIABETES: ICD-10-CM

## 2024-05-07 DIAGNOSIS — E03.9 HYPOTHYROIDISM, UNSPECIFIED TYPE: ICD-10-CM

## 2024-05-07 DIAGNOSIS — R42 DIZZINESS: ICD-10-CM

## 2024-05-07 LAB
ALBUMIN SERPL-MCNC: 3.7 G/DL (ref 3.4–5)
ALBUMIN/GLOB SERPL: 0.9 {RATIO} (ref 1–2)
ALP LIVER SERPL-CCNC: 103 U/L
ALT SERPL-CCNC: 20 U/L
ANION GAP SERPL CALC-SCNC: 6 MMOL/L (ref 0–18)
AST SERPL-CCNC: 7 U/L (ref 15–37)
BASOPHILS # BLD AUTO: 0.03 X10(3) UL (ref 0–0.2)
BASOPHILS NFR BLD AUTO: 0.3 %
BILIRUB SERPL-MCNC: 0.5 MG/DL (ref 0.1–2)
BUN BLD-MCNC: 18 MG/DL (ref 9–23)
CALCIUM BLD-MCNC: 9.6 MG/DL (ref 8.5–10.1)
CHLORIDE SERPL-SCNC: 105 MMOL/L (ref 98–112)
CHOLEST SERPL-MCNC: 193 MG/DL (ref ?–200)
CO2 SERPL-SCNC: 25 MMOL/L (ref 21–32)
COMPLEXED PSA SERPL-MCNC: 1.1 NG/ML (ref ?–4)
CREAT BLD-MCNC: 1.25 MG/DL
EGFRCR SERPLBLD CKD-EPI 2021: 63 ML/MIN/1.73M2 (ref 60–?)
EOSINOPHIL # BLD AUTO: 0.13 X10(3) UL (ref 0–0.7)
EOSINOPHIL NFR BLD AUTO: 1.4 %
ERYTHROCYTE [DISTWIDTH] IN BLOOD BY AUTOMATED COUNT: 15.7 %
EST. AVERAGE GLUCOSE BLD GHB EST-MCNC: 128 MG/DL (ref 68–126)
FASTING PATIENT LIPID ANSWER: YES
FASTING STATUS PATIENT QL REPORTED: YES
GLOBULIN PLAS-MCNC: 4 G/DL (ref 2.8–4.4)
GLUCOSE BLD-MCNC: 100 MG/DL (ref 70–99)
HBA1C MFR BLD: 6.1 % (ref ?–5.7)
HCT VFR BLD AUTO: 45.7 %
HDLC SERPL-MCNC: 45 MG/DL (ref 40–59)
HGB BLD-MCNC: 14.6 G/DL
IMM GRANULOCYTES # BLD AUTO: 0.02 X10(3) UL (ref 0–1)
IMM GRANULOCYTES NFR BLD: 0.2 %
LDLC SERPL CALC-MCNC: 131 MG/DL (ref ?–100)
LYMPHOCYTES # BLD AUTO: 2.97 X10(3) UL (ref 1–4)
LYMPHOCYTES NFR BLD AUTO: 32.5 %
MCH RBC QN AUTO: 26.8 PG (ref 26–34)
MCHC RBC AUTO-ENTMCNC: 31.9 G/DL (ref 31–37)
MCV RBC AUTO: 84 FL
MONOCYTES # BLD AUTO: 0.75 X10(3) UL (ref 0.1–1)
MONOCYTES NFR BLD AUTO: 8.2 %
NEUTROPHILS # BLD AUTO: 5.25 X10 (3) UL (ref 1.5–7.7)
NEUTROPHILS # BLD AUTO: 5.25 X10(3) UL (ref 1.5–7.7)
NEUTROPHILS NFR BLD AUTO: 57.4 %
NONHDLC SERPL-MCNC: 148 MG/DL (ref ?–130)
OSMOLALITY SERPL CALC.SUM OF ELEC: 284 MOSM/KG (ref 275–295)
PLATELET # BLD AUTO: 326 10(3)UL (ref 150–450)
POTASSIUM SERPL-SCNC: 4.1 MMOL/L (ref 3.5–5.1)
PROT SERPL-MCNC: 7.7 G/DL (ref 6.4–8.2)
RBC # BLD AUTO: 5.44 X10(6)UL
SODIUM SERPL-SCNC: 136 MMOL/L (ref 136–145)
TRIGL SERPL-MCNC: 93 MG/DL (ref 30–149)
TSI SER-ACNC: 2 MIU/ML (ref 0.36–3.74)
VLDLC SERPL CALC-MCNC: 17 MG/DL (ref 0–30)
WBC # BLD AUTO: 9.2 X10(3) UL (ref 4–11)

## 2024-05-07 PROCEDURE — 85025 COMPLETE CBC W/AUTO DIFF WBC: CPT

## 2024-05-07 PROCEDURE — 80053 COMPREHEN METABOLIC PANEL: CPT

## 2024-05-07 PROCEDURE — 83036 HEMOGLOBIN GLYCOSYLATED A1C: CPT

## 2024-05-07 PROCEDURE — 80061 LIPID PANEL: CPT

## 2024-05-07 PROCEDURE — 84443 ASSAY THYROID STIM HORMONE: CPT

## 2024-05-07 PROCEDURE — 36415 COLL VENOUS BLD VENIPUNCTURE: CPT

## 2024-05-08 NOTE — TELEPHONE ENCOUNTER
From: Arron Westfall  To: Earline Rg  Sent: 5/7/2024 6:29 PM CDT  Subject: Lab tests done on today     Please Review my lab test results and let me know as soon as possible I would appreciate it , thanks so much in advance.Arron WESTFALL

## 2024-05-20 ENCOUNTER — TELEPHONE (OUTPATIENT)
Dept: INTERNAL MEDICINE CLINIC | Facility: CLINIC | Age: 69
End: 2024-05-20

## 2024-05-20 RX ORDER — AMOXICILLIN AND CLAVULANATE POTASSIUM 875; 125 MG/1; MG/1
1 TABLET, FILM COATED ORAL 2 TIMES DAILY
Qty: 20 TABLET | Refills: 0 | Status: SHIPPED | OUTPATIENT
Start: 2024-05-20 | End: 2024-05-30

## 2024-05-20 NOTE — TELEPHONE ENCOUNTER
S/w patient. C/o fever and body aches, onset Friday after going to doctors office with his wife. Also noticed slight sinus pressure and nasal congestion. Thinks he may have caught viral/bacteria there. Last fever 101.7F orally on Friday night. Took OTC Tylenol which helped body aches/fever. Also notes he had some urinary frequency Friday and Saturday but denies dysuria, hematuria, foul odor or cloudy urine, N/V, abd/pelvic/flank/low back pain, states urinary frequency improved after Saturday. He did start an antibiotic (amoxicillin) he had a home on Saturday (he took one dose), Sunday he took 2 doses and this morning took last dose he had (states he is aware that if it was viral it would not not help his symptoms, but does feel like it has helped). Denies chills, cough, sore throat, ear ache, chest congestion, rash, abd pain, diarrhea or recent sick contacts. Took neg COVID test on Saturday.     He leaves out of town on Thursday for a wedding this weekend and wondering if you could rx additional abx so he can complete treatment? Please advise?

## 2024-05-20 NOTE — TELEPHONE ENCOUNTER
Sent amoxicillin-clavulonate for treatment of presumed sinus infection. Take twice daily for 10 days

## 2024-05-20 NOTE — TELEPHONE ENCOUNTER
Patient called the office to report that he has been experiencing a fever onset for 2 days, he states that he has tried tylenol and that has not helped. He also states that he took 2 at home covid tests on Friday and Saturday morning and both were negative. Patient also states that he took some leftover amoxicillin clavulanate 875-125 MG Oral Tab he had and it relieved his fever and is requesting more to assist with his fever. Please call back at [phone] and advise.

## 2024-05-22 RX ORDER — AMOXICILLIN AND CLAVULANATE POTASSIUM 875; 125 MG/1; MG/1
TABLET, FILM COATED ORAL
Qty: 20 TABLET | Refills: 0 | OUTPATIENT
Start: 2024-05-22

## 2024-05-22 RX ORDER — LEVOTHYROXINE SODIUM 137 UG/1
137 TABLET ORAL
Qty: 90 TABLET | Refills: 3 | Status: SHIPPED | OUTPATIENT
Start: 2024-05-22

## 2024-05-22 NOTE — TELEPHONE ENCOUNTER
Amoxicillin-Pot Clavulanate Oral Tablet 875-125 MG          Will file in chart as: AMOXICILLIN CLAVULANATE 875-125 MG Oral Tab     Possible duplicate: Hover to review recent actions on this medication    Sig: TAKE 1 TABLET BY MOUTH 2 TIMES A DAY FOR 10 DAYS    Disp: 20 tablet    Refills: 0    Start: 5/20/2024    Class: Normal    Non-formulary    Last ordered: 8 months ago (9/25/2023) by Earline Rg MD    Last refill: 9/25/2023    Rx #: [id number]        Name from pharmacy: Levothyroxine Sodium Oral Tablet 137 MCG         Will file in chart as: LEVOTHYROXINE 137 MCG Oral Tab    Sig: TAKE 1 TABLET BY MOUTH BEFORE BREAKFAST    Disp: 90 tablet    Refills: 0    Start: 5/20/2024    Class: Normal    Non-formulary    Last ordered: 7 months ago (10/18/2023) by Earline Rg MD    Last refill: 2/20/2024    Rx #: [id number]    Thyroid Medication Protocol [id number]/20/2024 09:43 AM   Protocol Details TSH in past 12 months    Last TSH value is normal    In person appointment or virtual visit in the past 12 mos or appointment in next 3 mos      LOV 5/6/24   RTC 6 weeks  Amoxicillin filled 5/20  No future appointments.

## 2024-08-01 ENCOUNTER — HOSPITAL ENCOUNTER (EMERGENCY)
Age: 69
Discharge: HOME | End: 2024-08-01
Attending: EMERGENCY MEDICINE
Payer: MEDICARE

## 2024-08-01 ENCOUNTER — APPOINTMENT (OUTPATIENT)
Dept: CT IMAGING | Age: 69
End: 2024-08-01
Attending: PHYSICIAN ASSISTANT
Payer: MEDICARE

## 2024-08-01 VITALS
DIASTOLIC BLOOD PRESSURE: 64 MMHG | RESPIRATION RATE: 16 BRPM | HEIGHT: 66 IN | BODY MASS INDEX: 40.18 KG/M2 | SYSTOLIC BLOOD PRESSURE: 153 MMHG | HEART RATE: 78 BPM | WEIGHT: 250 LBS | TEMPERATURE: 99 F | OXYGEN SATURATION: 100 %

## 2024-08-01 DIAGNOSIS — R10.30 LOWER ABDOMINAL PAIN: Primary | ICD-10-CM

## 2024-08-01 LAB
ALBUMIN SERPL-MCNC: 3.6 G/DL (ref 3.4–5)
ALBUMIN/GLOB SERPL: 0.9 {RATIO} (ref 1–2)
ALP LIVER SERPL-CCNC: 99 U/L
ALT SERPL-CCNC: 23 U/L
ANION GAP SERPL CALC-SCNC: 4 MMOL/L (ref 0–18)
AST SERPL-CCNC: 13 U/L (ref 15–37)
BASOPHILS # BLD AUTO: 0.02 X10(3) UL (ref 0–0.2)
BASOPHILS NFR BLD AUTO: 0.2 %
BILIRUB SERPL-MCNC: 0.4 MG/DL (ref 0.1–2)
BILIRUB UR QL STRIP.AUTO: NEGATIVE
BUN BLD-MCNC: 14 MG/DL (ref 9–23)
CALCIUM BLD-MCNC: 9.3 MG/DL (ref 8.5–10.1)
CHLORIDE SERPL-SCNC: 102 MMOL/L (ref 98–112)
CLARITY UR REFRACT.AUTO: CLEAR
CO2 SERPL-SCNC: 27 MMOL/L (ref 21–32)
COLOR UR AUTO: YELLOW
CREAT BLD-MCNC: 1.11 MG/DL
EGFRCR SERPLBLD CKD-EPI 2021: 72 ML/MIN/1.73M2 (ref 60–?)
EOSINOPHIL # BLD AUTO: 0.1 X10(3) UL (ref 0–0.7)
EOSINOPHIL NFR BLD AUTO: 1.1 %
ERYTHROCYTE [DISTWIDTH] IN BLOOD BY AUTOMATED COUNT: 14.8 %
GLOBULIN PLAS-MCNC: 4 G/DL (ref 2.8–4.4)
GLUCOSE BLD-MCNC: 149 MG/DL (ref 70–99)
GLUCOSE UR STRIP.AUTO-MCNC: NEGATIVE MG/DL
HCT VFR BLD AUTO: 43.5 %
HGB BLD-MCNC: 14.3 G/DL
IMM GRANULOCYTES # BLD AUTO: 0.02 X10(3) UL (ref 0–1)
IMM GRANULOCYTES NFR BLD: 0.2 %
KETONES UR STRIP.AUTO-MCNC: NEGATIVE MG/DL
LEUKOCYTE ESTERASE UR QL STRIP.AUTO: NEGATIVE
LYMPHOCYTES # BLD AUTO: 2.21 X10(3) UL (ref 1–4)
LYMPHOCYTES NFR BLD AUTO: 24.4 %
MCH RBC QN AUTO: 27.1 PG (ref 26–34)
MCHC RBC AUTO-ENTMCNC: 32.9 G/DL (ref 31–37)
MCV RBC AUTO: 82.5 FL
MONOCYTES # BLD AUTO: 0.74 X10(3) UL (ref 0.1–1)
MONOCYTES NFR BLD AUTO: 8.2 %
NEUTROPHILS # BLD AUTO: 5.95 X10 (3) UL (ref 1.5–7.7)
NEUTROPHILS # BLD AUTO: 5.95 X10(3) UL (ref 1.5–7.7)
NEUTROPHILS NFR BLD AUTO: 65.9 %
NITRITE UR QL STRIP.AUTO: NEGATIVE
OSMOLALITY SERPL CALC.SUM OF ELEC: 279 MOSM/KG (ref 275–295)
PH UR STRIP.AUTO: 5.5 [PH] (ref 5–8)
PLATELET # BLD AUTO: 318 10(3)UL (ref 150–450)
POTASSIUM SERPL-SCNC: 4 MMOL/L (ref 3.5–5.1)
PROT SERPL-MCNC: 7.6 G/DL (ref 6.4–8.2)
PROT UR STRIP.AUTO-MCNC: NEGATIVE MG/DL
RBC # BLD AUTO: 5.27 X10(6)UL
RBC UR QL AUTO: NEGATIVE
SODIUM SERPL-SCNC: 133 MMOL/L (ref 136–145)
SP GR UR STRIP.AUTO: 1.01 (ref 1–1.03)
UROBILINOGEN UR STRIP.AUTO-MCNC: 0.2 MG/DL
WBC # BLD AUTO: 9 X10(3) UL (ref 4–11)

## 2024-08-01 PROCEDURE — 81003 URINALYSIS AUTO W/O SCOPE: CPT | Performed by: PHYSICIAN ASSISTANT

## 2024-08-01 PROCEDURE — 74177 CT ABD & PELVIS W/CONTRAST: CPT | Performed by: PHYSICIAN ASSISTANT

## 2024-08-01 PROCEDURE — 80053 COMPREHEN METABOLIC PANEL: CPT | Performed by: PHYSICIAN ASSISTANT

## 2024-08-01 PROCEDURE — 99284 EMERGENCY DEPT VISIT MOD MDM: CPT

## 2024-08-01 PROCEDURE — 85025 COMPLETE CBC W/AUTO DIFF WBC: CPT | Performed by: PHYSICIAN ASSISTANT

## 2024-08-01 PROCEDURE — 96374 THER/PROPH/DIAG INJ IV PUSH: CPT

## 2024-08-01 PROCEDURE — 96361 HYDRATE IV INFUSION ADD-ON: CPT

## 2024-08-01 RX ORDER — ONDANSETRON 2 MG/ML
4 INJECTION INTRAMUSCULAR; INTRAVENOUS
Status: DISCONTINUED | OUTPATIENT
Start: 2024-08-01 | End: 2024-08-01

## 2024-08-01 RX ORDER — KETOROLAC TROMETHAMINE 15 MG/ML
15 INJECTION, SOLUTION INTRAMUSCULAR; INTRAVENOUS ONCE AS NEEDED
Status: COMPLETED | OUTPATIENT
Start: 2024-08-01 | End: 2024-08-01

## 2024-08-01 NOTE — DISCHARGE INSTRUCTIONS
Drink lots of fluids.  [place] diet as tolerated.  Return for new/worsening symptoms (i.e. worsening pain, fevers, vomiting, etc.)

## 2024-08-01 NOTE — ED PROVIDER NOTES
Patient Seen in: [facility] In [place]      History     Chief Complaint   Patient presents with    Abdomen/Flank Pain     Stated Complaint: llq abd pain onset yesterday    Subjective:   HPI    Patient states that he started with left lower quadrant pain yesterday after eating lunch.  States that it has been fairly constant since then and waxes and wanes.  He notices that the pain is worse when he lays on his side and is better when he lays flat.  Denies radiation of pain.  States that he has had some increased urinary frequency but denies dysuria, hematuria.  States stool was softer today than normal but denies loose or watery stools.  Denies black or bloody stools.  States he had rice and yogurt for lunch today and pain was worse afterwards again.  Denies fevers, chills, vomiting.  Denies testicular pain/swelling.  States he has been diagnosed with diverticulosis previously but no diverticulitis.  Denies any other complaints or concerns at this time.    Objective:   Past Medical History:    Abdominal hernia    Abdominal pain    Arthritis    Back problem    lower    Bloating    Constipation    Dizziness    Essential hypertension    Flatulence/gas pain/belching    Heartburn    Hemorrhoids    Hyperlipidemia    Hypovitaminosis D    Indigestion    Irregular bowel habits    Pain in joints    Pain with bowel movements    Sleep apnea    Sleep disturbance    Sputum production    Unspecified hypothyroidism    Wears glasses    Wheezing              Past Surgical History:   Procedure Laterality Date    Colonoscopy      Colonoscopy  10 yrs ago    Hemorrhoidectomy      Hernia surgery                  Social History     Socioeconomic History    Marital status:    Tobacco Use    Smoking status: Never    Smokeless tobacco: Never   Vaping Use    Vaping status: Never Used   Substance and Sexual Activity    Alcohol use: No    Drug use: No   Other Topics Concern    Caffeine Concern Yes     Comment: 1-2 cups  daily.    Exercise No              Review of Systems    Positive for stated Chief Complaint: Abdomen/Flank Pain    Other systems are as noted in HPI.  Constitutional and vital signs reviewed.      All other systems reviewed and negative except as noted above.    Physical Exam     ED Triage Vitals [08/01/24 1452]   /72   Pulse 87   Resp 18   Temp 98.6 °F (37 °C)   Temp src Temporal   SpO2 99 %   O2 Device None (Room air)       Current Vitals:   Vital Signs  BP: 153/64  Pulse: 78  Resp: 16  Temp: 98.6 °F (37 °C)  Temp src: Temporal    Oxygen Therapy  SpO2: 100 %  O2 Device: None (Room air)            Physical Exam  Vitals and nursing note reviewed.   Constitutional:       Appearance: He is well-developed.   HENT:      Head: Normocephalic.   Cardiovascular:      Rate and Rhythm: Normal rate and regular rhythm.      Heart sounds: Normal heart sounds.   Pulmonary:      Effort: Pulmonary effort is normal.      Breath sounds: Normal breath sounds.   Abdominal:      General: Abdomen is flat. Bowel sounds are normal.      Palpations: Abdomen is soft.      Tenderness: There is abdominal tenderness in the left lower quadrant. There is no guarding or rebound.   Skin:     General: Skin is warm and dry.   Neurological:      General: No focal deficit present.      Mental Status: He is alert.   Psychiatric:         Mood and Affect: Mood normal.               ED Course     Labs Reviewed   COMP METABOLIC PANEL (14) - Abnormal; Notable for the following components:       Result Value    Glucose 149 (*)     Sodium 133 (*)     AST 13 (*)     A/G Ratio 0.9 (*)     All other components within normal limits   CBC WITH DIFFERENTIAL WITH PLATELET    Narrative:     The following orders were created for panel order CBC With Differential With Platelet.  Procedure                               Abnormality         Status                     ---------                               -----------         ------                     CBC W/  DIFFERENTIAL[[id number]]                              Final result                 Please view results for these tests on the individual orders.   URINALYSIS WITH CULTURE REFLEX   CBC W/ DIFFERENTIAL             CT ABDOMEN+PELVIS(CONTRAST ONLY)(CPT=74177)    Result Date: 8/1/2024  PROCEDURE:  CT ABDOMEN+PELVIS (CONTRAST ONLY) (CPT=74177)  COMPARISON:  [facility], CT, CT ABDOMEN+PELVIS(CONTRAST ONLY)(CPT=74177), 12/29/2022, 12:05 PM.  INDICATIONS:  llq abd pain onset yesterday  TECHNIQUE:  CT scanning was performed from the dome of the diaphragm to the pubic symphysis with non-ionic intravenous contrast material. Post contrast coronal MPR imaging was performed.  Dose reduction techniques were used. Dose information is transmitted to the ACR (American College of Radiology) NRDR (National Radiology Data Registry) which includes the Dose Index Registry.  PATIENT STATED HISTORY:(As transcribed by Technologist)    CONTRAST USED:   FINDINGS:  LIVER:  No enlargement, atrophy, abnormal density, or significant focal lesion.  BILIARY:  No visible dilatation or calcification.  PANCREAS:  No lesion, fluid collection, ductal dilatation, or atrophy.  SPLEEN:  No enlargement or focal lesion.  KIDNEYS:  No hydronephrosis.  No nephrolithiasis.  There are few small benign cysts right kidney the largest measures 2 cm unchanged.  No further follow-up recommended. ADRENALS:  No mass or enlargement.  AORTA/VASCULAR:  No aneurysm or dissection.  RETROPERITONEUM:  No mass or adenopathy.  BOWEL/MESENTERY:  No visible mass, obstruction, or bowel wall thickening.  There is a 1 cm calcification within the fat adjacent to the descending colon consistent with the area of benign fat necrosis. ABDOMINAL WALL:  Tiny fat containing inguinal hernias. URINARY BLADDER:  No visible focal wall thickening, lesion, or calculus.  PELVIC NODES:  No adenopathy.  PELVIC ORGANS:  Mildly enlarged prostate. BONES:  No fracture.  Degenerative disc disease  throughout the visualized spine. LUNG BASES:  No visible pulmonary or pleural disease.  OTHER:  Negative.             CONCLUSION:  No acute disease.  Nonemergent incidental findings as described above.  Stable exam compared to previous.   LOCATION:  [place] [address]   Dictated by (CST): Tony Piña MD on 8/01/2024 at 4:37 PM     Finalized by (CST): Tony Piña MD on 8/01/2024 at 4:40 PM               MDM      Differential diagnosis includes but is not limited to diverticulitis, colitis, UTI, kidney stone.    Patient well-appearing, nontoxic.  Abdominal exam is benign.  Discussed all results and plan with patient and wife.  No acute findings on CT.  Mild hyponatremia on labs, addressed with IVF.  Patient feels better after IV fluids and pain meds here.  I advised supportive care at home, close follow-up with PCP and provided specific return precautions.  Patient verbalized understanding agreement of plan.  Dr Washburn has seen and evaluated patient and agrees with plan.                                   Medical Decision Making  Amount and/or Complexity of Data Reviewed  External Data Reviewed: notes.     Details: Reviewed GI notes from 2/2022.  Patient has h/o chronic RLQ abdominal pain, h/o diverticulosis  Labs:  Decision-making details documented in ED Course.  Radiology:  Decision-making details documented in ED Course.    Risk  OTC drugs.        Disposition and Plan     Clinical Impression:  1. Lower abdominal pain         Disposition:  Discharge  8/1/2024  5:00 pm    Follow-up:  Earline Rg MD  [address] [address] [id number] [phone]    Follow up in 3 day(s)            Medications Prescribed:  Current Discharge Medication List

## 2024-08-01 NOTE — ED PROVIDER NOTES
Patient with a history of hypertension, hyperlipidemia, hypothyroidism, prediabetes, reflux, chronic lower extremity edema among others.  Patient complaining of left lower quadrant abdominal pain unrelieved with Tylenol.  Pain is worse since eating lunch.  There is no nausea associate with his symptoms.  No vomiting.  Patient had a bowel movement today that was loose.  No diarrhea.  No black or bloody stool.  No urinary symptoms such as burning or blood in the urine.  Patient notes that he will occasionally get pain in the right lower abdomen that he attributes to his diverticular disease but has never had pain on the left side      On examination, this alert adult man who appears in no extraordinary distress awake and conversant  Eye sclera white  Abdomen is soft, obese, tender in the left lower quadrant without involuntary guarding, rigidity, rebound.      CT scan from 2022  BOWEL/MESENTERY:  Normal caliber appendix. Uncomplicated colonic diverticulosis.  Focal area of wall thickening involving the right side of the colon just proximal to the ileocecal valve.  This may represent a focal contraction although underlying mass   cannot be excluded., series 3, image 43 and series 5 image 62.     Patient's left lower quadrant pain suggest possibility of diverticulitis.  Urinary tract infection/renal colic/pyelonephritis also include the differential.    Patient treated with IV fluid and offered pain and nausea medicine    Urinalysis shows negative blood, nitrites, leukocytes  CBC shows normal white count, hemoglobin, and platelets  Metabolic panel shows mild hyponatremia corrected with the IV fluid administered.  Creatinine normal.  LFTs normal    CT abdomen pelvis  I personally reviewed the actual radiographs themselves and my individual interpretation shows no diverticulitis  radiologist's formal interpretation which I have reviewed  CONCLUSION:  No acute disease.  Nonemergent incidental findings as described above.   Stable exam compared to previous.       I recommend supportive measures with fluids, light diet, and Tylenol for pain  I recommend close follow-up with her primary care doctor      I provided a substantive portion of care for this patient. I personally performed the medical decision making for this encounter.

## 2024-08-02 ENCOUNTER — PATIENT OUTREACH (OUTPATIENT)
Dept: CASE MANAGEMENT | Age: 69
End: 2024-08-02

## 2024-08-02 NOTE — PROGRESS NOTES
Pt had recent ED visit, calling to offer [facility] ED follow-up apt (discharged 08/01)    Dr Earline Rg  Internal Medicine  [address] [place] [id number] [phone]  [facility] to call [phone] to assist w/scheduling apt

## 2024-08-05 NOTE — PROGRESS NOTES
Pt had recent ED visit, calling to offer [facility] ED follow-up apt (discharged 08/01)     Dr Earline Rg  Internal Medicine  [address] [place] [address] [phone]  [facility] to call [phone] to assist w/scheduling apt

## 2024-08-06 NOTE — PROGRESS NOTES
VM received; called pt back    Pt had recent ED visit, calling to offer [facility] follow-up apt (discharged 08/01)     Dr Earline Rg  Internal Medicine  [address]  [place] [address]  [phone]  Apt made:  Thu 08/08 @3:30pm w/Dr Skye Kurtz  Confirmed w/pt  Closing encounter

## 2024-08-06 NOTE — PROGRESS NOTES
Pt had recent ED visit, calling to offer [facility] follow-up apt (discharged 08/01)     Dr Earline Rg  Internal Medicine  [address] [place] [id number] [phone]  Multiple attempts w/no calls back; no apt made  Closing encounter

## 2024-08-08 ENCOUNTER — OFFICE VISIT (OUTPATIENT)
Dept: INTERNAL MEDICINE CLINIC | Facility: CLINIC | Age: 69
End: 2024-08-08
Payer: MEDICARE

## 2024-08-08 VITALS
RESPIRATION RATE: 20 BRPM | BODY MASS INDEX: 42 KG/M2 | OXYGEN SATURATION: 100 % | SYSTOLIC BLOOD PRESSURE: 140 MMHG | DIASTOLIC BLOOD PRESSURE: 50 MMHG | WEIGHT: 260 LBS | HEART RATE: 83 BPM | TEMPERATURE: 98 F

## 2024-08-08 DIAGNOSIS — R60.0 BILATERAL LOWER EXTREMITY EDEMA: ICD-10-CM

## 2024-08-08 DIAGNOSIS — R39.198 DECREASED URINE STREAM: ICD-10-CM

## 2024-08-08 DIAGNOSIS — R10.84 GENERALIZED ABDOMINAL PAIN: Primary | ICD-10-CM

## 2024-08-08 DIAGNOSIS — M25.511 ACUTE PAIN OF RIGHT SHOULDER: ICD-10-CM

## 2024-08-08 PROCEDURE — 99214 OFFICE O/P EST MOD 30 MIN: CPT | Performed by: INTERNAL MEDICINE

## 2024-08-08 RX ORDER — TAMSULOSIN HYDROCHLORIDE 0.4 MG/1
0.4 CAPSULE ORAL DAILY
Qty: 30 CAPSULE | Refills: 0 | Status: SHIPPED | OUTPATIENT
Start: 2024-08-08

## 2024-08-08 NOTE — PATIENT INSTRUCTIONS
- Follow up with Dr. Rg next month as scheduled  - Increase famotidine to twice daily.  If this does not help your abdominal pain may need to see GI specialists.  - Schedule shoulder x-ray and echocardiogram.  Schedule through [facility] or call central scheduling at [phone]  - Start tamsulosin medicine to help with urinary symptoms. Take 1 capsule daily.  If that helps Dr. Rg can refill it when she sees you next month.    It was a pleasure seeing you in the clinic today.  Thank you for choosing the [facility] [place] office for your healthcare needs. Please call at [phone] with any questions or concerns.    Skye Kurtz MD

## 2024-08-08 NOTE — PROGRESS NOTES
Arron Westfall is a 69 year old male.   HPI:   Patient presents for follow up on several issues.      He presented to [facility] emergency department a week ago with acute left lower quadrant pain x 1-2 days.  Started after eating a meal.  CBC, CMP, UA at emergency department unremarkable.  CT abdomen/pelvis with no acute findings.  Was doing better for a few days, however he had some mild right upper quadrant pain today after eating again.     Other issues:  Has been dealing with acute pain in right upper arm/shoulder.  For past 4-5 days.   Swelling in legs bilaterally - especially when walking long periods, when he does not elevate legs.  Going on for six weeks.   Decreased urinary stream for past 1-2 months.  When he urinates, only dribbles a little bit.  Increased frequency.     Past medical, family, surgical and social history were reviewed as listed in the chart, and are unchanged from previous visit on 5/6/2024  REVIEW OF SYSTEMS:   GENERAL/ const: no fevers/chills, no unintentional weight loss  EYES:no vision problems  HEENT: denies sinus pain or sinus tenderness  LUNGS: denies shortness of breath   CARDIOVASCULAR: denies chest pain  GI: abdominal pain  : decreased urinary stream, increased frequency  MUSCULOSKELETAL: acute right shoulder pain  ALLERGY:   Allergies   Allergen Reactions    1-Methyl 2-Pyrrolidone OTHER (SEE COMMENTS)    Atorvastatin NAUSEA ONLY and DIZZINESS    Statins MYALGIA    Seasonal ITCHING and OTHER (SEE COMMENTS)     PAST HISTORY:     Current Outpatient Medications:     tamsulosin 0.4 MG Oral Cap, Take 1 capsule (0.4 mg total) by mouth daily., Disp: 30 capsule, Rfl: 0    levothyroxine 137 MCG Oral Tab, TAKE 1 TABLET BY MOUTH BEFORE BREAKFAST, Disp: 90 tablet, Rfl: 3    losartan 50 MG Oral Tab, Take 1 tablet (50 mg total) by mouth daily., Disp: 30 tablet, Rfl: 0    famotidine 20 MG Oral Tab, Take 1 tablet (20 mg total) by mouth 2 (two) times daily., Disp: 180 tablet, Rfl: 3     levocetirizine 5 MG Oral Tab, Take 1 tablet (5 mg total) by mouth every evening., Disp: , Rfl:     Melatonin 10 MG Oral Tab, Take 10 mg by mouth as needed. (Patient not taking: Reported on 5/6/2024), Disp: , Rfl:     amLODIPine 10 MG Oral Tab, Take 1 tablet (10 mg total) by mouth daily., Disp: , Rfl:     MOMETASONE FUROATE 0.1 % External Cream, APPLY ONE TIME A DAY    TO AFFECTED AREA FOR 14 DAYS, Disp: 15 g, Rfl: 0    aspirin 81 MG Oral Tab, Take 1 tablet (81 mg total) by mouth daily., Disp: , Rfl:   Medical:  has a past medical history of Abdominal hernia (8 yrs ago), Abdominal pain, Arthritis, Back problem, Bloating (2weeks ago), Constipation, Dizziness (2 weeks ago), Essential hypertension, Flatulence/gas pain/belching (2 weeks ago), Heartburn (12yrs ago), Hemorrhoids (since18 yrs ago), Hyperlipidemia, Hypovitaminosis D, Indigestion (12yrs ago), Irregular bowel habits, Pain in joints, Pain with bowel movements (sometimes), Sleep apnea (since18 yrs ago), Sleep disturbance, Sputum production (2weeks ago), Unspecified hypothyroidism, Wears glasses, and Wheezing.  Surgical:  has a past surgical history that includes hemorrhoidectomy; colonoscopy; hernia surgery; and colonoscopy (10 yrs ago).  Family: family history includes Diabetes in his brother, father, mother, and sister; Heart Attack in his mother; Heart Disorder (age of onset: 73) in his brother; Other in his brother.  Social:  reports that he has never smoked. He has never used smokeless tobacco. He reports that he does not drink alcohol and does not use drugs.  Wt Readings from Last 6 Encounters:   08/08/24 260 lb (117.9 kg)   08/01/24 250 lb (113.4 kg)   05/06/24 253 lb 9.6 oz (115 kg)   03/20/24 253 lb (114.8 kg)   12/06/23 253 lb 6.4 oz (114.9 kg)   12/04/23 245 lb (111.1 kg)     EXAM:   /50 (BP Location: Left arm, Patient Position: Sitting, Cuff Size: large)   Pulse 83   Temp 98.3 °F (36.8 °C) (Temporal)   Resp 20   Wt 260 lb (117.9 kg)   SpO2  100%   BMI 41.97 kg/m²   GENERAL: Alert and oriented, well developed, well nourished,in no apparent distress  HEENT: atraumatic, PERRLA, EOMI, normal lid and conjunctiva  LUNGS: clear to auscultation bilaterally, no wheezing/rubs  CARDIO: RRR without murmurs.  No clubbing, cyanosis; 1+ bilateral lower extremity edema, shin height  GI: soft non tender nondistended no hepatosplenomegaly, bowel sounds throughout  NEURO: CN II-XII intact, 5/5 strength all extremities  MS: Full ROM, pain in right shoulder when raising right arm  PSYCH: pleasant, appropriate mood and affect  ASSESSMENT AND PLAN:   1. Generalized abdominal pain  Patient presented to [facility] ED a week ago with acute left lower quadrant pain.  CT abdomen/pelvis with no acute findings.  CBC/CMP unremarkable.  Was doing fine since then, had some right upper quadrant pain and swelling after eating yesterday.  No cholelithiasis on CT scan.  Recommend increasing famotidine dose to BID.  May need GI evaluation if symptoms persist.    2. Bilateral lower extremity edema  Bilateral edema.  Normal CMP last week at ED.  Will check echo - normal EF in 2021.  - CARD ECHO 2D DOPPLER (CPT=93306); Future    3. Decreased urine stream  Weak stream and increased frequency.  Suspect BPH.  Going on for several weeks. No dysuria.  Will start trial of tamsulosin.    - tamsulosin 0.4 MG Oral Cap; Take 1 capsule (0.4 mg total) by mouth daily.  Dispense: 30 capsule; Refill: 0    4. Acute pain of right shoulder  For past 4-5 days.  No specific injury.  Pain when raising right arm.  Will check shoulder x-ray.  - XR SHOULDER, COMPLETE (MIN 2 VIEWS), RIGHT (CPT=73030); Future    Patient Care Team:  Earline Rg MD as PCP - General (Internal Medicine)  Gina Monroy APRN (Nurse Practitioner)  Anupam Means, PT as Physical Therapist  Essence Cisneros PA as Physician Assistant (Physician Assistant)  Evelyn Esquivel, PT as Physical Therapist  Deyanira Luna MD  (GASTROENTEROLOGY)  Alec Curran PA-C (Physician Assistant)  Benito Abbott MD (Surgery, Orthopedic Adult Reconstructive)  Annabelle Leyva, PT as Physical Therapist  Bonnie Nayak PTA as Physical Therapy Assistant (Physical Therapy)  Janet Moreno, PT as Physical Therapist (Physical Therapy)  The patient indicates understanding of these issues and agrees to the plan.  The patient is asked to return to clinic on 9/11/24 as previously scheduled with Dr. Rg.    Skye Kurtz MD

## 2024-08-12 ENCOUNTER — HOSPITAL ENCOUNTER (OUTPATIENT)
Dept: GENERAL RADIOLOGY | Age: 69
Discharge: HOME | End: 2024-08-12
Attending: INTERNAL MEDICINE
Payer: MEDICARE

## 2024-08-12 DIAGNOSIS — M25.511 ACUTE PAIN OF RIGHT SHOULDER: ICD-10-CM

## 2024-08-12 PROCEDURE — 73030 X-RAY EXAM OF SHOULDER: CPT | Performed by: INTERNAL MEDICINE

## 2024-08-13 ENCOUNTER — HOSPITAL ENCOUNTER (OUTPATIENT)
Dept: CV DIAGNOSTICS | Facility: HOSPITAL | Age: 69
Discharge: HOME | End: 2024-08-13
Attending: INTERNAL MEDICINE
Payer: MEDICARE

## 2024-08-13 DIAGNOSIS — R60.0 BILATERAL LOWER EXTREMITY EDEMA: ICD-10-CM

## 2024-08-13 PROCEDURE — 93306 TTE W/DOPPLER COMPLETE: CPT | Performed by: INTERNAL MEDICINE

## 2024-08-15 NOTE — ED NOTES
LVM regarding [facility] apt for therapy.  Prior clt of [facility]LEE Corea   Pt back from MRI ambulates to bathroom with steady gait.  Pt and family updated on POC

## 2024-09-04 ENCOUNTER — OFFICE VISIT (OUTPATIENT)
Dept: ORTHOPEDICS CLINIC | Facility: CLINIC | Age: 69
End: 2024-09-04
Payer: MEDICARE

## 2024-09-04 ENCOUNTER — TELEPHONE (OUTPATIENT)
Dept: ORTHOPEDICS CLINIC | Facility: CLINIC | Age: 69
End: 2024-09-04

## 2024-09-04 VITALS — HEIGHT: 66 IN | BODY MASS INDEX: 41.78 KG/M2 | WEIGHT: 260 LBS

## 2024-09-04 DIAGNOSIS — M17.0 PRIMARY OSTEOARTHRITIS OF BOTH KNEES: Primary | ICD-10-CM

## 2024-09-04 PROCEDURE — 20610 DRAIN/INJ JOINT/BURSA W/O US: CPT | Performed by: PHYSICIAN ASSISTANT

## 2024-09-04 PROCEDURE — 99213 OFFICE O/P EST LOW 20 MIN: CPT | Performed by: PHYSICIAN ASSISTANT

## 2024-09-04 RX ORDER — TRIAMCINOLONE ACETONIDE 40 MG/ML
80 INJECTION, SUSPENSION INTRA-ARTICULAR; INTRAMUSCULAR ONCE
Status: COMPLETED | OUTPATIENT
Start: 2024-09-04 | End: 2024-09-04

## 2024-09-04 RX ADMIN — TRIAMCINOLONE ACETONIDE 80 MG: 40 INJECTION, SUSPENSION INTRA-ARTICULAR; INTRAMUSCULAR at 12:39:00

## 2024-09-04 NOTE — PROGRESS NOTES
[facility] Progress Note    Subjective: Patient returns to clinic for follow-up evaluation of both knees.  He reports the left knee continues to be more painful than the right knee.  He continues to experience a least 3 months of relief from steroid injections.  He does have questions today about PRP injections as he reports his son discussed this with him as a potential option.  The patient also has questions about the edema affecting both of his lower legs.    Objective: Patient seated comfortably in the exam chair.  Alert and oriented.  No acute distress.  Bilateral knees without any redness, warmth, or effusion noted.  1+ pitting edema in both legs.  Ambulating independently in clinic today.    Assessment/Plan: 69-year-old male with severe left knee osteoarthritis and moderate to severe right knee osteoarthritis with exacerbation of symptoms.  We thoroughly discussed nonsurgical options in detail again.  We discussed continued use of intra-articular corticosteroid injections as well as the risk of wear of the cartilage, which in his case is minimal risk as he has no cartilage in the medial compartment of his left knee.  We did at length discussed PRP injections as well and I discussed the limited efficacy of PRP demonstrated and studies with regards to osteoarthritis.  I also discussed the out-of-pocket cost associated with PRP injections.  I provided him with names of our providers within our group that performed PRP injections in case he did want to discuss this with them.  He expressed interest in receiving steroid injections into both knees in clinic today which were administered, please see separate procedure note for additional details.  With regards to the patient's edema, I discussed potential nonorthopedic causes for this and recommend that he follow-up with his primary care doctor for further examination and treatment.  All of his questions were sought and answered satisfactorily.  He is happy  with the plan will follow as advised.      Alec Curran PA-C  [facility] Orthopedic Surgery    This note was dictated using Dragon software.  While it was briefly proofread prior to completion, some grammatical, spelling, and word choice errors due to dictation may still occur.

## 2024-09-04 NOTE — PROCEDURES
Risks and benefits of knee injection discussed with the patient, with risks including but not limited to pain and swelling at the injection site and/or within the knee joint, infection, elevation in blood pressure and/or glucose levels, facial flushing. After informed consent, the patient's right and left knees were marked, locally anesthetized with skin refrigerant, prepped with topical antiseptic, and injected with a mixture of 1mL 40mg/mL Kenalog, 2mL 1% lidocaine and 2mL 0.5% marcaine through the inferolateral portal.  A band-aid was applied.  The patient tolerated the procedure well.    Alec Curran PA-C  [facility] Orthopedic Surgery

## 2024-09-04 NOTE — TELEPHONE ENCOUNTER
For severe knee arthritis like the patient has in the medial compartments, the success rates are much lower than for mild/moderate OA. The studies are quite variable in their findings but I would say less than 50% of patients with more severe OA perceive benefit, whereas in earlier stages PRP success rates are higher, reaching up to 75% or more in the studies I've read. I would generally  patients toward TKA instead at this stage of OA, and if they are not candidates or refuse surgery I would recommend pain management referral for consideration of RFA or other alternative treatments with more evidence and better insurance coverage.

## 2024-09-04 NOTE — TELEPHONE ENCOUNTER
----- Message from Alec Curran sent at 9/4/2024 12:15 PM CDT -----  Good afternoon!    I explained PRP injections as best as I could with the patient, but he was interested in having a clinical staff call him to answer any lingering questions about PRP and also schedule an appt with Dr. Wilson in [facility].     Thank you!    Alec

## 2024-09-04 NOTE — TELEPHONE ENCOUNTER
Patient wants to wait for a few months since he just got the steroid injection.     Patient wants to know about the success rates for PRP.    He is hesitant due to non coverage and pain from injection.  He will contact us when he is interested.        PRP was discussed with patient and all questions were answered except success rates related to PRP.    What offices can you get PRP?  [facility] (he would like to go there)  The actual procedure as well as cost of injections.

## 2024-09-05 NOTE — TELEPHONE ENCOUNTER
Called and spoke with Arron about PRP and RFA.  All questions answered and more information sent via [facility] to help patient make an informed decision.

## 2024-09-11 ENCOUNTER — OFFICE VISIT (OUTPATIENT)
Dept: INTERNAL MEDICINE CLINIC | Facility: CLINIC | Age: 69
End: 2024-09-11
Payer: MEDICARE

## 2024-09-11 VITALS
OXYGEN SATURATION: 98 % | HEART RATE: 78 BPM | DIASTOLIC BLOOD PRESSURE: 62 MMHG | HEIGHT: 66 IN | WEIGHT: 256.81 LBS | BODY MASS INDEX: 41.27 KG/M2 | RESPIRATION RATE: 16 BRPM | SYSTOLIC BLOOD PRESSURE: 130 MMHG | TEMPERATURE: 98 F

## 2024-09-11 DIAGNOSIS — K40.20 NON-RECURRENT BILATERAL INGUINAL HERNIA WITHOUT OBSTRUCTION OR GANGRENE: ICD-10-CM

## 2024-09-11 DIAGNOSIS — E55.9 HYPOVITAMINOSIS D: ICD-10-CM

## 2024-09-11 DIAGNOSIS — I10 ESSENTIAL HYPERTENSION: Primary | ICD-10-CM

## 2024-09-11 DIAGNOSIS — N52.9 ERECTILE DYSFUNCTION, UNSPECIFIED ERECTILE DYSFUNCTION TYPE: ICD-10-CM

## 2024-09-11 DIAGNOSIS — R73.03 PRE-DIABETES: ICD-10-CM

## 2024-09-11 DIAGNOSIS — R60.0 LOWER EXTREMITY EDEMA: ICD-10-CM

## 2024-09-11 DIAGNOSIS — M25.511 CHRONIC RIGHT SHOULDER PAIN: ICD-10-CM

## 2024-09-11 DIAGNOSIS — E53.8 VITAMIN B12 DEFICIENCY: ICD-10-CM

## 2024-09-11 DIAGNOSIS — G89.29 CHRONIC RIGHT SHOULDER PAIN: ICD-10-CM

## 2024-09-11 PROCEDURE — 99215 OFFICE O/P EST HI 40 MIN: CPT | Performed by: INTERNAL MEDICINE

## 2024-09-11 RX ORDER — TADALAFIL 10 MG/1
10 TABLET ORAL
Qty: 20 TABLET | Refills: 3 | Status: SHIPPED | OUTPATIENT
Start: 2024-09-11

## 2024-09-11 RX ORDER — FUROSEMIDE 20 MG
20 TABLET ORAL 2 TIMES DAILY
Qty: 30 TABLET | Refills: 0 | Status: SHIPPED | OUTPATIENT
Start: 2024-09-11

## 2024-09-11 NOTE — PROGRESS NOTES
[facility] Internal Medicine Office Note  Chief Complaint:   Chief Complaint   Patient presents with    Follow - Up       HPI:   This is a 69 year old male coming in for several concerns   HPI    HTN - he notes some days his blood pressure is good and other days his BP is high  BP well controlled today on amlodipine 10mg   He stopped losartan (took while trial off of amlodipine)    Lower extremity edema   He did a trial off of amlodipine for a month and his leg swelling worsened during this time and did not improve     Vitamin B12 was low in the past     Pain in right shoulder   He had an x-ray that showed osteoarthritis  Seen in 8/8/24 for this issue  He sometimes takes tylenol or aleve when pain is more severe     B/l knee pain and had shots last week. He notes pain is 80% improve       He notes he has a hard time breathing sometimes when he is walking    He feels vertigo symptoms for seconds and he holds on at wherever he is at and symptoms pass   He has vertigo if he rolls over in bed or moves his head quickly   He had vestibular therapy for 6 weeks and notes symptoms   He tried to do the MRI ordered by ENT twice but he had palpitations and had to stop the study   He has called Dr. Adame's office to make another appointment     ED  Sildenafil caused headache      Patient Active Problem List   Diagnosis    Hypothyroidism    Hypovitaminosis D    Pre-diabetes    Incomplete right bundle branch block    Diastolic dysfunction    Diverticulosis of large intestine    Internal hemorrhoids    Shortness of breath    Coronary artery disease involving native coronary artery of native heart without angina pectoris    Vitamin B 12 deficiency    MICAH (obstructive sleep apnea)    Trigger ring finger of right hand    Benign essential hypertension    Morbid obesity with BMI of 40.0-44.9, adult (HCC)    Bilateral primary osteoarthritis of knee    Mixed hyperlipidemia    Iron deficiency    Bilateral carotid artery  stenosis     Past Surgical History:   Procedure Laterality Date    Colonoscopy      Colonoscopy  10 yrs ago    Hemorrhoidectomy      Hernia surgery       Family History   Problem Relation Age of Onset    Diabetes Father     Heart Attack Mother     Diabetes Mother     Diabetes Sister     Diabetes Brother     Heart Disorder Brother 73        AMI    Other (Other) Brother         ESRD        I reviewed his's Past Medical History, Past Surgical History, Family History and   Social History updated shows  Social History     Socioeconomic History    Marital status:    Tobacco Use    Smoking status: Never    Smokeless tobacco: Never   Vaping Use    Vaping status: Never Used   Substance and Sexual Activity    Alcohol use: No    Drug use: No   Other Topics Concern    Caffeine Concern Yes     Comment: 1-2 cups daily.    Exercise No     Allergies:  Allergies   Allergen Reactions    1-Methyl 2-Pyrrolidone OTHER (SEE COMMENTS)    Atorvastatin NAUSEA ONLY and DIZZINESS    Statins MYALGIA    Seasonal ITCHING and OTHER (SEE COMMENTS)     Current Outpatient Medications   Medication Sig Dispense Refill    furosemide 20 MG Oral Tab Take 1 tablet (20 mg total) by mouth 2 (two) times daily. 30 tablet 0    Tadalafil 10 MG Oral Tab Take 1 tablet (10 mg total) by mouth daily as needed for Erectile Dysfunction. 20 tablet 3    levothyroxine 137 MCG Oral Tab TAKE 1 TABLET BY MOUTH BEFORE BREAKFAST 90 tablet 3    famotidine 20 MG Oral Tab Take 1 tablet (20 mg total) by mouth 2 (two) times daily. 180 tablet 3    levocetirizine 5 MG Oral Tab Take 1 tablet (5 mg total) by mouth every evening.      Melatonin 10 MG Oral Tab Take 10 mg by mouth as needed.      amLODIPine 10 MG Oral Tab Take 1 tablet (10 mg total) by mouth daily.      MOMETASONE FUROATE 0.1 % External Cream APPLY ONE TIME A DAY    TO AFFECTED AREA FOR 14 DAYS 15 g 0    aspirin 81 MG Oral Tab Take 1 tablet (81 mg total) by mouth daily.      tamsulosin 0.4 MG Oral Cap Take 1  capsule (0.4 mg total) by mouth daily. (Patient not taking: Reported on 9/11/2024) 30 capsule 0         REVIEW OF SYSTEMS:   Review of Systems   Constitutional:  Negative for fever.   Eyes:  Negative for visual disturbance.   Respiratory:  Negative for shortness of breath.    Cardiovascular:  Negative for chest pain.   Gastrointestinal:  Negative for constipation.   Neurological: Negative.    Hematological: Negative.    Psychiatric/Behavioral: Negative.          EXAM:   /62   Pulse 78   Temp 98.3 °F (36.8 °C) (Temporal)   Resp 16   Ht 5' 6\" (1.676 m)   Wt 256 lb 12.8 oz (116.5 kg)   SpO2 98%   BMI 41.45 kg/m²  Estimated body mass index is 41.45 kg/m² as calculated from the following:    Height as of this encounter: 5' 6\" (1.676 m).    Weight as of this encounter: 256 lb 12.8 oz (116.5 kg).   Vital signs reviewed. Appears stated age, well groomed.  Physical Exam  Vitals reviewed.   Constitutional:       General: He is not in acute distress.     Appearance: He is well-developed.   HENT:      Head: Normocephalic and atraumatic.   Eyes:      Conjunctiva/sclera: Conjunctivae normal.   Cardiovascular:      Rate and Rhythm: Normal rate and regular rhythm.      Heart sounds: Normal heart sounds.   Pulmonary:      Effort: Pulmonary effort is normal.      Breath sounds: Normal breath sounds.   Musculoskeletal:      Cervical back: Neck supple.   Skin:     General: Skin is warm and dry.   Neurological:      General: No focal deficit present.      Mental Status: He is alert.   Psychiatric:         Mood and Affect: Mood normal.          ASSESSMENT AND PLAN:   Arron Westfall is a 69 year old male with  1. Essential hypertension    2. Lower extremity edema    3. Pre-diabetes    4. Vitamin B12 deficiency    5. Hypovitaminosis D    6. Non-recurrent bilateral inguinal hernia without obstruction or gangrene    7. Chronic right shoulder pain    8. Erectile dysfunction, unspecified erectile dysfunction type          The  plan is as follows  Arron was seen today for follow - up.    Diagnoses and all orders for this visit:    Essential hypertension - at goal; cont amlodipine 10mg. He did a trial off of the amlodipine for a month and leg swelling did not improve.     Lower extremity edema -likely related to venous insufficiency. ECHO performed was normal.   Start wearing compression stockings daily.   Walking can help   Elevate the legs above the level of the heart   Trial of Furosemide 20mg - take the water pill as needed for leg swelling   Check blood work about 2 weeks after starting the furosemide to check kidney/potassium levels  -     Basic Metabolic Panel (8) [E]; Future    Pre-diabetes -check A1c with next labs  -     Hemoglobin A1C; Future    Vitamin B12 deficiency -low in past requiring injections. Last check in normal range. Will recheck  -     Vitamin B12; Future    Hypovitaminosis D -low in past; recheck   -     Vitamin D [E]; Future    Non-recurrent bilateral inguinal hernia without obstruction or gangrene - described as tiny on CT. Observe for now     Chronic right shoulder pain - start physical therapy; order placed     Erectile dysfunction, unspecified erectile dysfunction type    Other orders  -     furosemide 20 MG Oral Tab; Take 1 tablet (20 mg total) by mouth 2 (two) times daily.  -     Tadalafil 10 MG Oral Tab; Take 1 tablet (10 mg total) by mouth daily as needed for Erectile Dysfunction.        Orders Placed This Encounter   Procedures    Vitamin B12    Hemoglobin A1C    Vitamin D [E]    Basic Metabolic Panel (8) [E]       Meds & Refills for this Visit:  Requested Prescriptions     Signed Prescriptions Disp Refills    furosemide 20 MG Oral Tab 30 tablet 0     Sig: Take 1 tablet (20 mg total) by mouth 2 (two) times daily.    Tadalafil 10 MG Oral Tab 20 tablet 3     Sig: Take 1 tablet (10 mg total) by mouth daily as needed for Erectile Dysfunction.       Imaging & Consults:  OP REFERRAL TO [facility]    Health Maintenance Due   Topic Date Due    COVID-19 Vaccine (5 - 2023-24 season) 09/01/2024    HTN: BP Follow-Up  09/08/2024     Patient/Caregiver Education: Patient/Caregiver Education: There are no barriers to learning. Medical education done. Outcome: Patient verbalizes understanding. Patient is notified to call with any questions, complications, allergies, or worsening or changing symptoms.  Patient is to call with any side effects or complications from the treatments as a result of today.     Earline Rg MD

## 2024-09-11 NOTE — PATIENT INSTRUCTIONS
Start wearing compression stockings daily.     Walking can help     Elevate the legs above the level of the heart     Furosemide 20mg - take the water pill as needed for leg swelling     Check blood work about 2 weeks after starting the furosemide to check kidney/potassium levels

## 2024-09-20 ENCOUNTER — TELEPHONE (OUTPATIENT)
Dept: INTERNAL MEDICINE CLINIC | Facility: CLINIC | Age: 69
End: 2024-09-20

## 2024-09-20 ENCOUNTER — HOSPITAL ENCOUNTER (EMERGENCY)
Age: 69
Discharge: HOME | End: 2024-09-20
Attending: EMERGENCY MEDICINE
Payer: MEDICARE

## 2024-09-20 ENCOUNTER — APPOINTMENT (OUTPATIENT)
Dept: CT IMAGING | Age: 69
End: 2024-09-20
Attending: EMERGENCY MEDICINE
Payer: MEDICARE

## 2024-09-20 VITALS
RESPIRATION RATE: 20 BRPM | DIASTOLIC BLOOD PRESSURE: 67 MMHG | SYSTOLIC BLOOD PRESSURE: 141 MMHG | TEMPERATURE: 98 F | WEIGHT: 250 LBS | HEIGHT: 66 IN | HEART RATE: 79 BPM | OXYGEN SATURATION: 100 % | BODY MASS INDEX: 40.18 KG/M2

## 2024-09-20 DIAGNOSIS — R10.9 ABDOMINAL PAIN, ACUTE: Primary | ICD-10-CM

## 2024-09-20 LAB
ALBUMIN SERPL-MCNC: 3.5 G/DL (ref 3.4–5)
ALBUMIN/GLOB SERPL: 0.9 {RATIO} (ref 1–2)
ALP LIVER SERPL-CCNC: 99 U/L
ALT SERPL-CCNC: 20 U/L
ANION GAP SERPL CALC-SCNC: 3 MMOL/L (ref 0–18)
AST SERPL-CCNC: 9 U/L (ref 15–37)
BASOPHILS # BLD AUTO: 0.01 X10(3) UL (ref 0–0.2)
BASOPHILS NFR BLD AUTO: 0.1 %
BILIRUB SERPL-MCNC: 0.4 MG/DL (ref 0.1–2)
BILIRUB UR QL STRIP.AUTO: NEGATIVE
BUN BLD-MCNC: 27 MG/DL (ref 9–23)
CALCIUM BLD-MCNC: 9.2 MG/DL (ref 8.5–10.1)
CHLORIDE SERPL-SCNC: 102 MMOL/L (ref 98–112)
CLARITY UR REFRACT.AUTO: CLEAR
CO2 SERPL-SCNC: 28 MMOL/L (ref 21–32)
COLOR UR AUTO: YELLOW
CREAT BLD-MCNC: 1.22 MG/DL
EGFRCR SERPLBLD CKD-EPI 2021: 65 ML/MIN/1.73M2 (ref 60–?)
EOSINOPHIL # BLD AUTO: 0.07 X10(3) UL (ref 0–0.7)
EOSINOPHIL NFR BLD AUTO: 0.5 %
ERYTHROCYTE [DISTWIDTH] IN BLOOD BY AUTOMATED COUNT: 15.6 %
GLOBULIN PLAS-MCNC: 4.1 G/DL (ref 2.8–4.4)
GLUCOSE BLD-MCNC: 109 MG/DL (ref 70–99)
GLUCOSE UR STRIP.AUTO-MCNC: NEGATIVE MG/DL
HCT VFR BLD AUTO: 43.1 %
HGB BLD-MCNC: 14.3 G/DL
IMM GRANULOCYTES # BLD AUTO: 0.04 X10(3) UL (ref 0–1)
IMM GRANULOCYTES NFR BLD: 0.3 %
KETONES UR STRIP.AUTO-MCNC: NEGATIVE MG/DL
LEUKOCYTE ESTERASE UR QL STRIP.AUTO: NEGATIVE
LIPASE SERPL-CCNC: 56 U/L (ref 12–53)
LYMPHOCYTES # BLD AUTO: 2.72 X10(3) UL (ref 1–4)
LYMPHOCYTES NFR BLD AUTO: 21 %
MCH RBC QN AUTO: 27.1 PG (ref 26–34)
MCHC RBC AUTO-ENTMCNC: 33.2 G/DL (ref 31–37)
MCV RBC AUTO: 81.8 FL
MONOCYTES # BLD AUTO: 1.13 X10(3) UL (ref 0.1–1)
MONOCYTES NFR BLD AUTO: 8.7 %
NEUTROPHILS # BLD AUTO: 8.98 X10 (3) UL (ref 1.5–7.7)
NEUTROPHILS # BLD AUTO: 8.98 X10(3) UL (ref 1.5–7.7)
NEUTROPHILS NFR BLD AUTO: 69.4 %
NITRITE UR QL STRIP.AUTO: NEGATIVE
OSMOLALITY SERPL CALC.SUM OF ELEC: 282 MOSM/KG (ref 275–295)
PH UR STRIP.AUTO: 5.5 [PH] (ref 5–8)
PLATELET # BLD AUTO: 333 10(3)UL (ref 150–450)
POTASSIUM SERPL-SCNC: 4.4 MMOL/L (ref 3.5–5.1)
PROT SERPL-MCNC: 7.6 G/DL (ref 6.4–8.2)
PROT UR STRIP.AUTO-MCNC: NEGATIVE MG/DL
RBC # BLD AUTO: 5.27 X10(6)UL
RBC UR QL AUTO: NEGATIVE
SODIUM SERPL-SCNC: 133 MMOL/L (ref 136–145)
SP GR UR STRIP.AUTO: 1.01 (ref 1–1.03)
UROBILINOGEN UR STRIP.AUTO-MCNC: 0.2 MG/DL
WBC # BLD AUTO: 13 X10(3) UL (ref 4–11)

## 2024-09-20 PROCEDURE — 81003 URINALYSIS AUTO W/O SCOPE: CPT | Performed by: EMERGENCY MEDICINE

## 2024-09-20 PROCEDURE — 96374 THER/PROPH/DIAG INJ IV PUSH: CPT

## 2024-09-20 PROCEDURE — 74177 CT ABD & PELVIS W/CONTRAST: CPT | Performed by: EMERGENCY MEDICINE

## 2024-09-20 PROCEDURE — 80053 COMPREHEN METABOLIC PANEL: CPT | Performed by: EMERGENCY MEDICINE

## 2024-09-20 PROCEDURE — 99284 EMERGENCY DEPT VISIT MOD MDM: CPT

## 2024-09-20 PROCEDURE — 99285 EMERGENCY DEPT VISIT HI MDM: CPT

## 2024-09-20 PROCEDURE — 85025 COMPLETE CBC W/AUTO DIFF WBC: CPT | Performed by: EMERGENCY MEDICINE

## 2024-09-20 PROCEDURE — 83690 ASSAY OF LIPASE: CPT | Performed by: EMERGENCY MEDICINE

## 2024-09-20 RX ORDER — MORPHINE SULFATE 4 MG/ML
2 INJECTION, SOLUTION INTRAMUSCULAR; INTRAVENOUS ONCE
Status: COMPLETED | OUTPATIENT
Start: 2024-09-20 | End: 2024-09-20

## 2024-09-20 RX ORDER — HYDROCODONE BITARTRATE AND ACETAMINOPHEN 5; 325 MG/1; MG/1
1-2 TABLET ORAL EVERY 6 HOURS PRN
Qty: 10 TABLET | Refills: 0 | Status: SHIPPED | OUTPATIENT
Start: 2024-09-20 | End: 2024-09-27

## 2024-09-20 NOTE — TELEPHONE ENCOUNTER
FYI:   S/w pt.   Pt stated a few days ago he started with pain on the LLQ. No constant, but intermittently throughout the days. Usually about an 8-9 out of 10 in pain.   Takes tylenol and can lower it to a 5/10.   No injury to that area.  Having regular BM's.   When laying down, pain is not as bad. Feels it walking or sitting.   Pt stated he does notice more pain after eating or an hour after eating.   No fever.     Pt has a history of Diverticulitis.  Advised pt since it's the end of the day and unable to schedule him today, needs to go to the ER/IC. They can rule out diverticulitis flare up or would be able to start him on abx if needed.     He v/u. Will be evaluated at IC.

## 2024-09-21 NOTE — ED PROVIDER NOTES
Patient Seen in: [facility] [place]      History     Chief Complaint   Patient presents with    Abdomen/Flank Pain     Stated Complaint: Diverticulitus - co left lower abdominal pain - denies NVD    Subjective:   HPI    This is a 69-year-old gentleman here for evaluation of left lower quadrant abdominal pain.  States progressively worsening over the past few days.  States had similar symptoms a few months ago, was seen here in the ER had an unremarkable CT scan no clear etiology was discovered.  Patient denies any urinary symptoms any vomiting, and diarrhea but states his stool was softer than usual.  Denies any other complaints or concerns any skin changes or rash or any other symptoms.    Objective:   Past Medical History:    Abdominal hernia    Abdominal pain    Arthritis    Back problem    lower    Bloating    Constipation    Dizziness    Essential hypertension    Flatulence/gas pain/belching    Heartburn    Hemorrhoids    Hyperlipidemia    Hypovitaminosis D    Indigestion    Irregular bowel habits    Pain in joints    Pain with bowel movements    Sleep apnea    Sleep disturbance    Sputum production    Unspecified hypothyroidism    Wears glasses    Wheezing              Past Surgical History:   Procedure Laterality Date    Colonoscopy      Colonoscopy  10 yrs ago    Hemorrhoidectomy      Hernia surgery                  Social History     Socioeconomic History    Marital status:    Tobacco Use    Smoking status: Never    Smokeless tobacco: Never   Vaping Use    Vaping status: Never Used   Substance and Sexual Activity    Alcohol use: No    Drug use: No   Other Topics Concern    Caffeine Concern Yes     Comment: 1-2 cups daily.    Exercise No              Review of Systems    Positive for stated Chief Complaint: Abdomen/Flank Pain    Other systems are as noted in HPI.  Constitutional and vital signs reviewed.      All other systems reviewed and negative except as noted  above.    Physical Exam     ED Triage Vitals [09/20/24 1950]   BP (!) 161/93   Pulse 70   Resp 15   Temp 98.7 °F (37.1 °C)   Temp src Oral   SpO2 98 %   O2 Device None (Room air)       Current Vitals:   Vital Signs  BP: 141/67  Pulse: 79  Resp: 20  Temp: 98.2 °F (36.8 °C)  Temp src: Oral    Oxygen Therapy  SpO2: 100 %  O2 Device: None (Room air)            Physical Exam        Physical Exam  Vitals signs and nursing note reviewed.   General:  Patient laying supine in the bed in no acute distress  Head: Normocephalic and atraumatic.   HEENT:  Mucous membranes are moist.   Cardiovascular:  Normal rate and regular rhythm.  No Edema  Pulmonary:  Pulmonary effort is normal.  Normal breath sounds. No wheezing, rhonchi or rales.   Abdominal: Protuberant abdomen is soft, bowel sounds are normal there is mild left lower quadrant tenderness no guarding rebound tenderness.  The skin over the area of concern appears normal.  Skin: Warm and dry  Neurological: Awake alert, speech is normal        ED Course     Labs Reviewed   CBC WITH DIFFERENTIAL WITH PLATELET - Abnormal; Notable for the following components:       Result Value    WBC 13.0 (*)     Neutrophil Absolute Prelim 8.98 (*)     Neutrophil Absolute 8.98 (*)     Monocyte Absolute 1.13 (*)     All other components within normal limits   COMP METABOLIC PANEL (14) - Abnormal; Notable for the following components:    Glucose 109 (*)     Sodium 133 (*)     BUN 27 (*)     AST 9 (*)     A/G Ratio 0.9 (*)     All other components within normal limits   LIPASE - Abnormal; Notable for the following components:    Lipase 56 (*)     All other components within normal limits   URINALYSIS WITH CULTURE REFLEX             CT ABDOMEN+PELVIS(CONTRAST ONLY)(CPT=74177)    Result Date: 9/20/2024  PROCEDURE:  CT ABDOMEN+PELVIS (CONTRAST ONLY) (CPT=74177)  COMPARISON:  [facility], CT, CT ABDOMEN+PELVIS(CONTRAST ONLY)(CPT=74177), 8/01/2024, 3:59 PM.  INDICATIONS:  Diverticulitus - co left lower  abdominal pain - denies NVD  TECHNIQUE:  CT scanning was performed from the dome of the diaphragm to the pubic symphysis with non-ionic intravenous contrast material. Post contrast coronal MPR imaging was performed.  Dose reduction techniques were used. Dose information is transmitted to the ACR (American College of Radiology) NRDR (National Radiology Data Registry) which includes the Dose Index Registry.  PATIENT STATED HISTORY:(As transcribed by Technologist)    Diverticulitus - co left lower abdominal pain   CONTRAST USED:  100cc of Isovue 370  FINDINGS:  LIVER:  No enlargement, atrophy, abnormal density, or significant focal lesion.  BILIARY:  No visible dilatation or calcification.  PANCREAS:  No lesion, fluid collection, ductal dilatation, or atrophy.  SPLEEN:  No enlargement or focal lesion.  KIDNEYS:  Cyst in the mid right kidney measures 2.3 x 2.3 cm.  Additional smaller cysts in the kidneys are noted. ADRENALS:  No mass or enlargement.  AORTA/VASCULAR:  No aneurysm or dissection.  RETROPERITONEUM:  No mass or adenopathy.  BOWEL/MESENTERY:  No visible mass, obstruction, or bowel wall thickening.  Small hiatal hernia. ABDOMINAL WALL:  Tiny fat containing left inguinal hernia. URINARY BLADDER:  No visible focal wall thickening, lesion, or calculus.  PELVIC NODES:  No adenopathy.  PELVIC ORGANS:  No visible mass.  Pelvic organs appropriate for patient age.  BONES:  No bony lesion or fracture.  LUNG BASES:  No visible pulmonary or pleural disease.  OTHER:  Negative.             CONCLUSION:   No evidence of an acute inflammatory process.   LOCATION:  [facility]   Dictated by (CST): Flavio Spicer MD on 9/20/2024 at 9:21 PM     Finalized by (CST): Flavio Spicer MD on 9/20/2024 at 9:23 PM               MDM      This is a 69-year-old gentleman here for evaluation of left lower quadrant abdominal pain.  Differential includes diverticulitis colitis, bowel obstruction, strangulated versus incarcerated hernia,  kidney stone.  CT abdomen pelvis was performed with IV contrast shows no acute abnormality no evidence of diverticulitis.  Urinalysis is bland does not appear consistent with infection.  Patient does report some improvement in discomfort after pain medications, reexamination shows no focal abdominal tenderness.  Patient reports that very similar abdominal discomfort last month of unclear etiology.  He has no skin changes suggestive of zoster.  Spoke with the patient and family about the unclear etiology of his symptoms at this time does not appear to be coming from his hip as I reexamine with range of motion of the left hip shows no reproducible symptoms.  He has negative straight leg raise, symptoms do not appear to be coming from the back either.  He will follow-up with his PMD for further evaluation return precautions discussed patient is in agreement with plan        I independently viewed and interpreted the following imaging: CT abdomen pelvis without evidence of bowel obstruction                         Medical Decision Making      Disposition and Plan     Clinical Impression:  1. Abdominal pain, acute         Disposition:  Discharge  9/20/2024 10:57 pm    Follow-up:  Earline Rg MD  [id number] [address] [address]  [phone]    Follow up  Insert          Medications Prescribed:  Discharge Medication List as of 9/20/2024 11:09 PM        START taking these medications    Details   HYDROcodone-acetaminophen 5-325 MG Oral Tab Take 1-2 tablets by mouth every 6 (six) hours as needed for Pain (do not take this medication prior to drinking alcohol or driving as this medication can impair your senses.). Do not drink alcohol or drive while taking this medication as it can impair yo ur senses., Normal, Disp-10 tablet, R-0

## 2024-09-21 NOTE — ED INITIAL ASSESSMENT (HPI)
Pt here to r/o diverticulitis, having LLQ abd pain for a few days. Today the pain became severe after having lunch. Pt also c/o mild diarrhea. Denies urinary symptoms or flank pain radiation. Denies fever. No nausea/vomiting.

## 2024-10-01 ENCOUNTER — LAB ENCOUNTER (OUTPATIENT)
Dept: LAB | Age: 69
End: 2024-10-01
Attending: INTERNAL MEDICINE
Payer: MEDICARE

## 2024-10-01 DIAGNOSIS — E55.9 HYPOVITAMINOSIS D: ICD-10-CM

## 2024-10-01 DIAGNOSIS — E53.8 VITAMIN B12 DEFICIENCY: ICD-10-CM

## 2024-10-01 DIAGNOSIS — E78.00 PURE HYPERCHOLESTEROLEMIA: ICD-10-CM

## 2024-10-01 DIAGNOSIS — R60.0 LOWER EXTREMITY EDEMA: ICD-10-CM

## 2024-10-01 DIAGNOSIS — E78.5 HYPERLIPEMIA: ICD-10-CM

## 2024-10-01 DIAGNOSIS — I25.810 CORONARY ATHEROSCLEROSIS OF AUTOLOGOUS VEIN BYPASS GRAFT: ICD-10-CM

## 2024-10-01 DIAGNOSIS — Z78.9 PERSON LIVING IN RESIDENTIAL INSTITUTION: Primary | ICD-10-CM

## 2024-10-01 DIAGNOSIS — R73.03 PRE-DIABETES: ICD-10-CM

## 2024-10-01 DIAGNOSIS — R73.03 BORDERLINE DIABETES: ICD-10-CM

## 2024-10-01 LAB
ALT SERPL-CCNC: 15 U/L
ANION GAP SERPL CALC-SCNC: 9 MMOL/L (ref 0–18)
AST SERPL-CCNC: 13 U/L (ref ?–34)
BUN BLD-MCNC: 15 MG/DL (ref 9–23)
CALCIUM BLD-MCNC: 9.9 MG/DL (ref 8.7–10.4)
CHLORIDE SERPL-SCNC: 104 MMOL/L (ref 98–112)
CHOLEST SERPL-MCNC: 181 MG/DL (ref ?–200)
CO2 SERPL-SCNC: 27 MMOL/L (ref 21–32)
CREAT BLD-MCNC: 1.09 MG/DL
EGFRCR SERPLBLD CKD-EPI 2021: 74 ML/MIN/1.73M2 (ref 60–?)
EST. AVERAGE GLUCOSE BLD GHB EST-MCNC: 134 MG/DL (ref 68–126)
FASTING PATIENT LIPID ANSWER: YES
FASTING STATUS PATIENT QL REPORTED: YES
GLUCOSE BLD-MCNC: 107 MG/DL (ref 70–99)
HBA1C MFR BLD: 6.3 % (ref ?–5.7)
HDLC SERPL-MCNC: 45 MG/DL (ref 40–59)
LDLC SERPL CALC-MCNC: 116 MG/DL (ref ?–100)
NONHDLC SERPL-MCNC: 136 MG/DL (ref ?–130)
OSMOLALITY SERPL CALC.SUM OF ELEC: 291 MOSM/KG (ref 275–295)
POTASSIUM SERPL-SCNC: 4.1 MMOL/L (ref 3.5–5.1)
SODIUM SERPL-SCNC: 140 MMOL/L (ref 136–145)
TRIGL SERPL-MCNC: 109 MG/DL (ref 30–149)
VIT B12 SERPL-MCNC: 262 PG/ML (ref 211–911)
VIT D+METAB SERPL-MCNC: 22.8 NG/ML (ref 30–100)
VLDLC SERPL CALC-MCNC: 19 MG/DL (ref 0–30)

## 2024-10-01 PROCEDURE — 36415 COLL VENOUS BLD VENIPUNCTURE: CPT

## 2024-10-01 PROCEDURE — 83036 HEMOGLOBIN GLYCOSYLATED A1C: CPT

## 2024-10-01 PROCEDURE — 82607 VITAMIN B-12: CPT

## 2024-10-01 PROCEDURE — 84450 TRANSFERASE (AST) (SGOT): CPT

## 2024-10-01 PROCEDURE — 82306 VITAMIN D 25 HYDROXY: CPT

## 2024-10-01 PROCEDURE — 80061 LIPID PANEL: CPT

## 2024-10-01 PROCEDURE — 80048 BASIC METABOLIC PNL TOTAL CA: CPT

## 2024-10-01 PROCEDURE — 84460 ALANINE AMINO (ALT) (SGPT): CPT

## 2024-10-02 RX ORDER — ERGOCALCIFEROL 1.25 MG/1
50000 CAPSULE, LIQUID FILLED ORAL WEEKLY
Qty: 12 CAPSULE | Refills: 0 | Status: SHIPPED | OUTPATIENT
Start: 2024-10-02 | End: 2024-12-31

## 2024-11-11 ENCOUNTER — TELEPHONE (OUTPATIENT)
Dept: INTERNAL MEDICINE CLINIC | Facility: CLINIC | Age: 69
End: 2024-11-11

## 2024-11-12 NOTE — TELEPHONE ENCOUNTER
Patient has had sore throat for 2 days. Patient denies fever/chills or any associated symptoms. Patient states he has been taking some \"leftover\" antibiotic pills from last time he was prescribed for a sinus infection in May 2024.     Writer recommended patient stop taking medication prescribed for different indication and stressed the importance of finishing a course of antibiotics when prescribed. Patient v/u and declined an appointment.

## 2024-11-12 NOTE — TELEPHONE ENCOUNTER
Please call patient to let them know the refill for amoxicillin was declined. Any antibiotic prescriptions require an appointment for evaluation.     Please triage and schedule with next available or respiratory appt if appropriate

## 2024-11-12 NOTE — TELEPHONE ENCOUNTER
Requesting    Name from pharmacy: Amoxicillin-Pot Clavulanate Oral Tablet 875-125 MG         Will file in chart as: AMOXICILLIN CLAVULANATE 875-125 MG Oral Tab    The original prescription was discontinued on 8/1/2024 by Melody Quijano, RN. Renewing this prescription may not be appropriate.    Sig: Take 1 tablet by mouth 2 (two) times daily for 10 days.    Disp: 20 tablet    Refills: 0    Start: 11/11/2024    Class: Normal    Non-formulary    Last ordered: 5 months ago (5/20/2024) by Earline Rg MD    Last refill: 5/20/2024    Rx #: [id number]       To be filled at: [facility] [address] [place] [address] [address] [phone], [phone]     LOV: 09/11/24 w/ LS   RTC: 12/11/24  Last Relevant Labs: 10/01/24    Future Appointments   Date Time Provider Department Center   11/19/2024 10:20 AM Deyanira Luna MD [facility]

## 2024-11-26 ENCOUNTER — OFFICE VISIT (OUTPATIENT)
Dept: INTERNAL MEDICINE CLINIC | Facility: CLINIC | Age: 69
End: 2024-11-26
Payer: MEDICARE

## 2024-11-26 VITALS
HEIGHT: 66 IN | OXYGEN SATURATION: 98 % | HEART RATE: 79 BPM | WEIGHT: 254.63 LBS | TEMPERATURE: 98 F | DIASTOLIC BLOOD PRESSURE: 74 MMHG | BODY MASS INDEX: 40.92 KG/M2 | SYSTOLIC BLOOD PRESSURE: 134 MMHG

## 2024-11-26 DIAGNOSIS — R05.2 SUBACUTE COUGH: Primary | ICD-10-CM

## 2024-11-26 DIAGNOSIS — E03.9 HYPOTHYROIDISM, UNSPECIFIED TYPE: ICD-10-CM

## 2024-11-26 PROCEDURE — 99214 OFFICE O/P EST MOD 30 MIN: CPT | Performed by: INTERNAL MEDICINE

## 2024-11-26 RX ORDER — CODEINE PHOSPHATE AND GUAIFENESIN 10; 100 MG/5ML; MG/5ML
5 SOLUTION ORAL 3 TIMES DAILY PRN
Qty: 1 EACH | Refills: 0 | Status: SHIPPED | OUTPATIENT
Start: 2024-11-26 | End: 2024-12-03

## 2024-11-26 NOTE — PROGRESS NOTES
Subjective:   Arron Westfall is a 69 year old male who presents for Cough for last 2 weeks.  Feels a scratchy feeling in the throat.  Produces mucoid sputum at times.  No fever or chills.  Has used over-the-counter cough syrups but has not helped.  The patient took Augmentin 1 tablet 2 times a day for 3 days.  Those tablets were leftover from previous visits.    The patient's wife is a liver transplant patient.  The patient says that they take healthy food because of that reason as well.  Patient was accompanied by his wife.      History/Other:    Chief Complaint Reviewed and Verified  No Further Nursing Notes to   Review  Tobacco Reviewed  Allergies Reviewed  Medications Reviewed    Medical History Reviewed  Surgical History Reviewed  Family History   Reviewed  Social History Reviewed         Tobacco:  He has never smoked tobacco.    Current Outpatient Medications   Medication Sig Dispense Refill    Tadalafil 10 MG Oral Tab Take 1 tablet (10 mg total) by mouth daily as needed for Erectile Dysfunction. 20 tablet 3    levothyroxine 137 MCG Oral Tab TAKE 1 TABLET BY MOUTH BEFORE BREAKFAST 90 tablet 3    famotidine 20 MG Oral Tab Take 1 tablet (20 mg total) by mouth 2 (two) times daily. 180 tablet 3    levocetirizine 5 MG Oral Tab Take 1 tablet (5 mg total) by mouth every evening.      amLODIPine 10 MG Oral Tab Take 1 tablet (10 mg total) by mouth daily.      aspirin 81 MG Oral Tab Take 1 tablet (81 mg total) by mouth daily.      ergocalciferol 1.25 MG (58937 UT) Oral Cap Take 1 capsule (50,000 Units total) by mouth once a week. 12 capsule 0    furosemide 20 MG Oral Tab Take 1 tablet (20 mg total) by mouth 2 (two) times daily. (Patient not taking: Reported on 11/19/2024) 30 tablet 0    tamsulosin 0.4 MG Oral Cap Take 1 capsule (0.4 mg total) by mouth daily. (Patient not taking: Reported on 9/11/2024) 30 capsule 0    Melatonin 10 MG Oral Tab Take 10 mg by mouth as needed. (Patient not taking: Reported on  11/26/2024)      MOMETASONE FUROATE 0.1 % External Cream APPLY ONE TIME A DAY    TO AFFECTED AREA FOR 14 DAYS (Patient not taking: Reported on 11/26/2024) 15 g 0         Review of Systems:  Pertinent items are noted in HPI.  A comprehensive review of systems was negative.      Objective:   /74 (BP Location: Right arm, Patient Position: Sitting, Cuff Size: large)   Pulse 79   Temp 98.2 °F (36.8 °C) (Temporal)   Ht 5' 6\" (1.676 m)   Wt 254 lb 9.6 oz (115.5 kg)   SpO2 98%   BMI 41.09 kg/m²  Estimated body mass index is 41.09 kg/m² as calculated from the following:    Height as of this encounter: 5' 6\" (1.676 m).    Weight as of this encounter: 254 lb 9.6 oz (115.5 kg).  General appearance: alert, appears stated age, and cooperative  Throat: lips, mucosa, and tongue normal; teeth and gums normal  Lungs: clear to auscultation bilaterally  Heart: S1, S2 normal, no murmur, click, rub or gallop, regular rate and rhythm  Abdomen: soft, non-tender; bowel sounds normal; no masses,  no organomegaly  Extremities: extremities normal, atraumatic, no cyanosis or edema  Neurologic: Grossly normal      Assessment & Plan:     1.  Upper airway cough syndrome : Patient has irritative cough.  Will give codeine containing cough syrup.  Advised to take plenty of fluid and laxatives.    I am also obtaining chest x-ray to rule out any chest infiltrates.    2.  Hypothyroidism: Recheck TSH.    3.  Class III obesity-had an extensive discussion on dietary modification and strategies for energy expenditure.  It appears that the patient consumes healthy diet.  However, her physical activity level is compromised because of knee osteoarthritis.    4.  Bilateral knee osteoarthritis: Advised physical therapy.  If need be, steroid injection in the knee can be done.  The patient had more than 3 steroid injection in the knees last year.    5.  Benign prostatic hypertrophy: Continue with tamsulosin      No follow-ups on file.    Pepe Escalera,  MD, 11/26/2024, 11:07 AM

## 2024-11-27 ENCOUNTER — HOSPITAL ENCOUNTER (OUTPATIENT)
Dept: GENERAL RADIOLOGY | Age: 69
Discharge: HOME | End: 2024-11-27
Attending: INTERNAL MEDICINE
Payer: MEDICARE

## 2024-11-27 ENCOUNTER — PATIENT MESSAGE (OUTPATIENT)
Dept: INTERNAL MEDICINE CLINIC | Facility: CLINIC | Age: 69
End: 2024-11-27

## 2024-11-27 DIAGNOSIS — R05.2 SUBACUTE COUGH: ICD-10-CM

## 2024-11-27 PROCEDURE — 71046 X-RAY EXAM CHEST 2 VIEWS: CPT | Performed by: INTERNAL MEDICINE

## 2024-12-19 RX ORDER — BENZONATATE 200 MG/1
200 CAPSULE ORAL 3 TIMES DAILY PRN
Qty: 30 CAPSULE | Refills: 0 | Status: SHIPPED | OUTPATIENT
Start: 2024-12-19

## 2024-12-20 ENCOUNTER — APPOINTMENT (OUTPATIENT)
Dept: GENERAL RADIOLOGY | Age: 69
End: 2024-12-20
Payer: MEDICARE

## 2024-12-20 ENCOUNTER — HOSPITAL ENCOUNTER (EMERGENCY)
Age: 69
Discharge: HOME | End: 2024-12-20
Attending: EMERGENCY MEDICINE
Payer: MEDICARE

## 2024-12-20 VITALS
HEIGHT: 66 IN | TEMPERATURE: 98 F | BODY MASS INDEX: 40.18 KG/M2 | RESPIRATION RATE: 17 BRPM | DIASTOLIC BLOOD PRESSURE: 86 MMHG | OXYGEN SATURATION: 98 % | SYSTOLIC BLOOD PRESSURE: 145 MMHG | HEART RATE: 81 BPM | WEIGHT: 250 LBS

## 2024-12-20 DIAGNOSIS — R05.2 SUBACUTE COUGH: Primary | ICD-10-CM

## 2024-12-20 LAB
POCT INFLUENZA A: NEGATIVE
POCT INFLUENZA B: NEGATIVE
SARS-COV-2 RNA RESP QL NAA+PROBE: NOT DETECTED

## 2024-12-20 PROCEDURE — 99284 EMERGENCY DEPT VISIT MOD MDM: CPT

## 2024-12-20 PROCEDURE — 87502 INFLUENZA DNA AMP PROBE: CPT

## 2024-12-20 PROCEDURE — 94640 AIRWAY INHALATION TREATMENT: CPT

## 2024-12-20 PROCEDURE — 87502 INFLUENZA DNA AMP PROBE: CPT | Performed by: EMERGENCY MEDICINE

## 2024-12-20 PROCEDURE — 71046 X-RAY EXAM CHEST 2 VIEWS: CPT

## 2024-12-20 RX ORDER — AZITHROMYCIN 250 MG/1
TABLET, FILM COATED ORAL
Qty: 6 TABLET | Refills: 0 | Status: SHIPPED | OUTPATIENT
Start: 2024-12-20 | End: 2024-12-25

## 2024-12-20 RX ORDER — ALBUTEROL SULFATE 90 UG/1
2 INHALANT RESPIRATORY (INHALATION) ONCE
Status: COMPLETED | OUTPATIENT
Start: 2024-12-20 | End: 2024-12-20

## 2024-12-20 RX ORDER — ALBUTEROL SULFATE 90 UG/1
2 INHALANT RESPIRATORY (INHALATION) EVERY 4 HOURS PRN
Qty: 1 EACH | Refills: 0 | Status: SHIPPED | OUTPATIENT
Start: 2024-12-20 | End: 2025-01-19

## 2024-12-20 RX ORDER — PREDNISONE 20 MG/1
20 TABLET ORAL DAILY
Qty: 5 TABLET | Refills: 0 | Status: SHIPPED | OUTPATIENT
Start: 2024-12-20 | End: 2024-12-25

## 2024-12-20 NOTE — ED INITIAL ASSESSMENT (HPI)
Patient here with cough over the past 5-6 weeks. States yesterday started with sore throat, runny nose, fever.

## 2024-12-21 NOTE — ED PROVIDER NOTES
Patient Seen in: [facility] In [place]      History     Chief Complaint   Patient presents with    Cough/URI    Sore Throat     Stated Complaint: cough [id number], sore throat    Subjective:   HPI      Patient presents with a persistent cough.  The patient states that he has been coughing for 5 or 6 weeks.  He has gotten worse as of yesterday.  He feels like there is more nasal drainage and he had a fever at night.  He states the cough is dry cough but is incessant.  He does not feel short of breath.  He gets a headache when he coughs.  He had previously used guaifenesin with codeine and now has been prescribed Tessalon Perles.  He feels like he needs antibiotics.    Objective:     Past Medical History:    Abdominal hernia    Abdominal pain    Arthritis    Back problem    lower    Bloating    Constipation    Dizziness    Essential hypertension    Flatulence/gas pain/belching    Frequent urination    Heartburn    Hemorrhoids    Hyperlipidemia    Hypovitaminosis D    Indigestion    Irregular bowel habits    Pain in joints    Pain with bowel movements    Sleep apnea    Sleep disturbance    Sputum production    Unspecified hypothyroidism    Wears glasses    Wheezing              Past Surgical History:   Procedure Laterality Date    Colonoscopy      Colonoscopy  10 yrs ago    Hemorrhoidectomy      Hernia surgery                  No pertinent social history.                Physical Exam     ED Triage Vitals [12/20/24 1735]   BP (!) 181/95   Pulse 84   Resp 20   Temp 98.4 °F (36.9 °C)   Temp src Oral   SpO2 97 %   O2 Device None (Room air)       Current Vitals:   Vital Signs  BP: 145/86  Pulse: 81  Resp: 17  Temp: 98.4 °F (36.9 °C)  Temp src: Oral    Oxygen Therapy  SpO2: 98 %  O2 Device: None (Room air)        Physical Exam  General: Alert and oriented x3 in no acute respiratory distress, very frequent dry cough.  HEENT: Normocephalic, atraumatic, pupils equal round and reactive to light, oropharynx  with erythema, no exudates, uvula midline, TMs clear bilaterally.  Neck: Supple.  Cardiovascular: Regular rate and rhythm, no murmurs.  Respiratory: Lungs clear to auscultation.  Extremities: No CCE.  Skin: Warm and dry.    ED Course     Labs Reviewed   RAPID SARS-COV-2 BY PCR - Normal   POCT FLU TEST - Normal    Narrative:     This assay is a rapid molecular in vitro test utilizing nucleic acid amplification of influenza A and B viral RNA.     I personally reviewed the chest films and no infiltrate noted.     XR CHEST PA + LAT CHEST (CPT=71046)    Result Date: 12/20/2024  PROCEDURE:  XR CHEST PA + LAT CHEST (CPT=71046)  INDICATIONS:  cough [id number], sore throat  COMPARISON:  [facility], XR, XR CHEST PA + LAT CHEST (CPT=71046), 11/27/2024, 10:58 AM.  TECHNIQUE:  PA and lateral chest radiographs were obtained.  PATIENT STATED HISTORY: (As transcribed by Technologist)  Patient had a dry cough and sore throat for one month. Patient stated he has a headache as well.    FINDINGS:  Heart size is within normal limits.  Pleural spaces appear clear.  Mediastinal and hilar contours are normal.  No focal consolidation.  If clinical symptoms persist then recommend follow-up imaging.            CONCLUSION:  See above.   LOCATION:  [id number]   Dictated by (CST): Miguel Contreras MD on 12/20/2024 at 7:27 PM     Finalized by (CST): Miguel Contreras MD on 12/20/2024 at 7:28 PM       Medications   albuterol (Ventolin HFA) 108 (90 Base) MCG/ACT inhaler 2 puff (2 puffs Inhalation Given 12/20/24 2036)     The patient was given 2 puffs of an albuterol inhaler for his cough and instructed with use of spacer.     MDM      Patient presents with a cough for weeks.  Differential diagnosis includes but is not limited to bronchitis, pneumonia and GERD.  The patient currently complains of fever and sore throat as well as nasal congestion.  I do think his cough is infectious and he seems to have a component of bronchospasm.  His COVID and flu testing has come  back negative.  I will give him an albuterol inhaler as well as prednisone for his throat pain.  Given the length of time that he has been ill I am also going to place him on antibiotics.  He was counseled regarding follow-up and symptoms to return for.        MDM    Disposition and Plan     Clinical Impression:  1. Subacute cough         Disposition:  Discharge  12/20/2024  8:20 pm    Follow-up:  Earline Rg MD  [address] [address] [address]  [phone]    Schedule an appointment as soon as possible for a visit in 3 day(s)            Medications Prescribed:  Discharge Medication List as of 12/20/2024  8:41 PM        START taking these medications    Details   albuterol 108 (90 Base) MCG/ACT Inhalation Aero Soln Inhale 2 puffs into the lungs every 4 (four) hours as needed for Wheezing., Normal, Disp-1 each, R-0      azithromycin (ZITHROMAX Z-JOSEPH) 250 MG Oral Tab 500 mg once followed by 250 mg daily x 4 days, Normal, Disp-6 tablet, R-0      amoxicillin clavulanate 875-125 MG Oral Tab Take 1 tablet by mouth 2 (two) times daily for 10 days., Normal, Disp-20 tablet, R-0      predniSONE 20 MG Oral Tab Take 1 tablet (20 mg total) by mouth daily for 5 days., Normal, Disp-5 tablet, R-0                 Supplementary Documentation:

## 2025-02-05 ENCOUNTER — OFFICE VISIT (OUTPATIENT)
Dept: ORTHOPEDICS CLINIC | Facility: CLINIC | Age: 70
End: 2025-02-05
Payer: MEDICARE

## 2025-02-05 VITALS — BODY MASS INDEX: 40 KG/M2 | WEIGHT: 250 LBS

## 2025-02-05 DIAGNOSIS — M17.0 PRIMARY OSTEOARTHRITIS OF BOTH KNEES: Primary | ICD-10-CM

## 2025-02-05 RX ORDER — TRIAMCINOLONE ACETONIDE 40 MG/ML
80 INJECTION, SUSPENSION INTRA-ARTICULAR; INTRAMUSCULAR ONCE
Status: COMPLETED | OUTPATIENT
Start: 2025-02-05 | End: 2025-02-05

## 2025-02-05 RX ADMIN — TRIAMCINOLONE ACETONIDE 80 MG: 40 INJECTION, SUSPENSION INTRA-ARTICULAR; INTRAMUSCULAR at 16:13:00

## 2025-02-05 NOTE — PROCEDURES
Risks and benefits of knee injection discussed with the patient, with risks including but not limited to pain and swelling at the injection site and/or within the knee joint, infection, elevation in blood pressure and/or glucose levels, facial flushing. After informed consent, the patient's right and left knees were marked, locally anesthetized with skin refrigerant, prepped with topical antiseptic, and injected with a mixture of 1mL 40mg/mL Kenalog, 2mL 1% lidocaine and 2mL 0.5% marcaine through the inferolateral portal.  A band-aid was applied.  The patient tolerated the procedure well.    Alec Curran PA-C  [facility] Orthopedic Surgery

## 2025-02-06 DIAGNOSIS — E55.9 VITAMIN D DEFICIENCY: Primary | ICD-10-CM

## 2025-02-06 DIAGNOSIS — Z00.00 ANNUAL PHYSICAL EXAM: ICD-10-CM

## 2025-02-06 RX ORDER — MECLIZINE HYDROCHLORIDE 25 MG/1
25 TABLET ORAL 3 TIMES DAILY PRN
Qty: 20 TABLET | Refills: 0 | Status: SHIPPED | OUTPATIENT
Start: 2025-02-06

## 2025-02-06 NOTE — TELEPHONE ENCOUNTER
Pt requesting medication refill for vertigo, leaving state Monday.    [facility] Store #: [id number]    Meclizine

## 2025-02-06 NOTE — TELEPHONE ENCOUNTER
Called patient   Future Appointments   Date Time Provider Department Center   5/6/2025  1:00 PM Pepe Escalera MD [facility] [place]     Patient wants physical labs ordered to complete prior to appt on 5/6   Requested to also check his iron levels, b12 and vitamin d

## 2025-03-01 ENCOUNTER — APPOINTMENT (OUTPATIENT)
Dept: GENERAL RADIOLOGY | Age: 70
End: 2025-03-01
Attending: NURSE PRACTITIONER
Payer: MEDICARE

## 2025-03-01 ENCOUNTER — LAB ENCOUNTER (OUTPATIENT)
Dept: LAB | Age: 70
End: 2025-03-01
Attending: INTERNAL MEDICINE
Payer: MEDICARE

## 2025-03-01 ENCOUNTER — HOSPITAL ENCOUNTER (OUTPATIENT)
Age: 70
Discharge: HOME | End: 2025-03-01
Payer: MEDICARE

## 2025-03-01 VITALS
SYSTOLIC BLOOD PRESSURE: 154 MMHG | TEMPERATURE: 98 F | HEIGHT: 66 IN | WEIGHT: 250 LBS | DIASTOLIC BLOOD PRESSURE: 90 MMHG | BODY MASS INDEX: 40.18 KG/M2 | OXYGEN SATURATION: 97 % | HEART RATE: 77 BPM | RESPIRATION RATE: 22 BRPM

## 2025-03-01 DIAGNOSIS — E55.9 VITAMIN D DEFICIENCY: ICD-10-CM
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
## 2025-03-01 DIAGNOSIS — J40 BRONCHITIS: ICD-10-CM

## 2025-03-01 DIAGNOSIS — J02.9 SORE THROAT: Primary | ICD-10-CM

## 2025-03-01 DIAGNOSIS — Z00.00 ANNUAL PHYSICAL EXAM: ICD-10-CM

## 2025-03-01 DIAGNOSIS — J02.9 VIRAL PHARYNGITIS: ICD-10-CM

## 2025-03-01 LAB
ALBUMIN SERPL-MCNC: 4.6 G/DL (ref 3.2–4.8)
ALBUMIN/GLOB SERPL: 1.3 {RATIO} (ref 1–2)
ALP LIVER SERPL-CCNC: 107 U/L
ALT SERPL-CCNC: 17 U/L
ANION GAP SERPL CALC-SCNC: 10 MMOL/L (ref 0–18)
AST SERPL-CCNC: 19 U/L (ref ?–34)
BASOPHILS # BLD AUTO: 0.03 X10(3) UL (ref 0–0.2)
BASOPHILS NFR BLD AUTO: 0.3 %
BILIRUB SERPL-MCNC: 0.5 MG/DL (ref 0.2–1.1)
BUN BLD-MCNC: 18 MG/DL (ref 9–23)
CALCIUM BLD-MCNC: 9.7 MG/DL (ref 8.7–10.6)
CHLORIDE SERPL-SCNC: 100 MMOL/L (ref 98–112)
CHOLEST SERPL-MCNC: 193 MG/DL (ref ?–200)
CO2 SERPL-SCNC: 27 MMOL/L (ref 21–32)
CREAT BLD-MCNC: 1.16 MG/DL
EGFRCR SERPLBLD CKD-EPI 2021: 69 ML/MIN/1.73M2 (ref 60–?)
EOSINOPHIL # BLD AUTO: 0.18 X10(3) UL (ref 0–0.7)
EOSINOPHIL NFR BLD AUTO: 2 %
ERYTHROCYTE [DISTWIDTH] IN BLOOD BY AUTOMATED COUNT: 15.8 %
EST. AVERAGE GLUCOSE BLD GHB EST-MCNC: 137 MG/DL (ref 68–126)
FASTING PATIENT LIPID ANSWER: YES
FASTING STATUS PATIENT QL REPORTED: YES
GLOBULIN PLAS-MCNC: 3.5 G/DL (ref 2–3.5)
GLUCOSE BLD-MCNC: 84 MG/DL (ref 70–99)
HBA1C MFR BLD: 6.4 % (ref ?–5.7)
HCT VFR BLD AUTO: 43.7 %
HDLC SERPL-MCNC: 46 MG/DL (ref 40–59)
HGB BLD-MCNC: 14 G/DL
IMM GRANULOCYTES # BLD AUTO: 0.02 X10(3) UL (ref 0–1)
IMM GRANULOCYTES NFR BLD: 0.2 %
LDLC SERPL CALC-MCNC: 133 MG/DL (ref ?–100)
LYMPHOCYTES # BLD AUTO: 2.23 X10(3) UL (ref 1–4)
LYMPHOCYTES NFR BLD AUTO: 24.3 %
MCH RBC QN AUTO: 26.8 PG (ref 26–34)
MCHC RBC AUTO-ENTMCNC: 32 G/DL (ref 31–37)
MCV RBC AUTO: 83.6 FL
MONOCYTES # BLD AUTO: 0.59 X10(3) UL (ref 0.1–1)
MONOCYTES NFR BLD AUTO: 6.4 %
NEUTROPHILS # BLD AUTO: 6.13 X10 (3) UL (ref 1.5–7.7)
NEUTROPHILS # BLD AUTO: 6.13 X10(3) UL (ref 1.5–7.7)
NEUTROPHILS NFR BLD AUTO: 66.8 %
NONHDLC SERPL-MCNC: 147 MG/DL (ref ?–130)
OSMOLALITY SERPL CALC.SUM OF ELEC: 285 MOSM/KG (ref 275–295)
PLATELET # BLD AUTO: 338 10(3)UL (ref 150–450)
POTASSIUM SERPL-SCNC: 4.7 MMOL/L (ref 3.5–5.1)
PROT SERPL-MCNC: 8.1 G/DL (ref 5.7–8.2)
RBC # BLD AUTO: 5.23 X10(6)UL
S PYO AG THROAT QL IA.RAPID: NEGATIVE
SODIUM SERPL-SCNC: 137 MMOL/L (ref 136–145)
TRIGL SERPL-MCNC: 77 MG/DL (ref 30–149)
TSI SER-ACNC: 6.07 UIU/ML (ref 0.55–4.78)
VIT D+METAB SERPL-MCNC: 30 NG/ML (ref 30–100)
VLDLC SERPL CALC-MCNC: 14 MG/DL (ref 0–30)
WBC # BLD AUTO: 9.2 X10(3) UL (ref 4–11)

## 2025-03-01 PROCEDURE — 80061 LIPID PANEL: CPT

## 2025-03-01 PROCEDURE — 99214 OFFICE O/P EST MOD 30 MIN: CPT

## 2025-03-01 PROCEDURE — 83036 HEMOGLOBIN GLYCOSYLATED A1C: CPT

## 2025-03-01 PROCEDURE — 85025 COMPLETE CBC W/AUTO DIFF WBC: CPT

## 2025-03-01 PROCEDURE — 84439 ASSAY OF FREE THYROXINE: CPT

## 2025-03-01 PROCEDURE — 80053 COMPREHEN METABOLIC PANEL: CPT

## 2025-03-01 PROCEDURE — 82306 VITAMIN D 25 HYDROXY: CPT

## 2025-03-01 PROCEDURE — 87651 STREP A DNA AMP PROBE: CPT | Performed by: NURSE PRACTITIONER

## 2025-03-01 PROCEDURE — 73090 X-RAY EXAM OF FOREARM: CPT | Performed by: NURSE PRACTITIONER

## 2025-03-01 PROCEDURE — 84443 ASSAY THYROID STIM HORMONE: CPT

## 2025-03-01 PROCEDURE — 71046 X-RAY EXAM CHEST 2 VIEWS: CPT | Performed by: NURSE PRACTITIONER

## 2025-03-01 PROCEDURE — 73080 X-RAY EXAM OF ELBOW: CPT | Performed by: NURSE PRACTITIONER

## 2025-03-01 PROCEDURE — 36415 COLL VENOUS BLD VENIPUNCTURE: CPT

## 2025-03-01 RX ORDER — BENZONATATE 100 MG/1
100 CAPSULE ORAL 3 TIMES DAILY PRN
Qty: 30 CAPSULE | Refills: 0 | Status: SHIPPED | OUTPATIENT
Start: 2025-03-01 | End: 2025-03-31

## 2025-03-01 RX ORDER — ALBUTEROL SULFATE 90 UG/1
2 INHALANT RESPIRATORY (INHALATION) EVERY 4 HOURS PRN
Qty: 1 EACH | Refills: 0 | Status: SHIPPED | OUTPATIENT
Start: 2025-03-01 | End: 2025-03-31

## 2025-03-01 RX ORDER — METHYLPREDNISOLONE 4 MG/1
TABLET ORAL
Qty: 1 EACH | Refills: 0 | Status: SHIPPED | OUTPATIENT
Start: 2025-03-01

## 2025-03-01 NOTE — ED PROVIDER NOTES
Patient Seen in: [facility]      History     Chief Complaint   Patient presents with    Sore Throat    Elbow Pain     Stated Complaint: Sore Throat; Fall/Elbow Injury    Subjective:   HPI      69-year-old male with history of hypertension, dyslipidemia and sleep apnea presents today with complaints of sore throat and elbow pain.  Patient states he was on vacation last week and he fell injuring his left elbow.  Patient states he also developed a sore throat with a cough.  Patient states he had leftover amoxicillin and took 4 days worth with some relief.  Patient denies any chest pain or shortness of breath associated with the cough.  Patient states he returned from his vacation last week.    Objective:     Past Medical History:    Abdominal hernia    Abdominal pain    Arthritis    Back problem    lower    Bloating    Constipation    Dizziness    Essential hypertension    Flatulence/gas pain/belching    Frequent urination    Heartburn    Hemorrhoids    Hyperlipidemia    Hypovitaminosis D    Indigestion    Irregular bowel habits    Pain in joints    Pain with bowel movements    Sleep apnea    Sleep disturbance    Sputum production    Unspecified hypothyroidism    Wears glasses    Wheezing              Past Surgical History:   Procedure Laterality Date    Colonoscopy      Colonoscopy  10 yrs ago    Hemorrhoidectomy      Hernia surgery                  Social History     Socioeconomic History    Marital status:    Tobacco Use    Smoking status: Never    Smokeless tobacco: Never   Vaping Use    Vaping status: Never Used   Substance and Sexual Activity    Alcohol use: No    Drug use: No   Other Topics Concern    Caffeine Concern Yes     Comment: 1-2 cups daily.    Exercise No              Review of Systems   Constitutional: Negative.    HENT:  Positive for sore throat.    Eyes: Negative.    Respiratory:  Positive for cough.    Cardiovascular: Negative.    Gastrointestinal: Negative.    Endocrine:  Negative.    Genitourinary: Negative.    Musculoskeletal:  Positive for joint swelling.   Skin: Negative.    Allergic/Immunologic: Negative.    Neurological: Negative.    Hematological: Negative.    Psychiatric/Behavioral: Negative.         Positive for stated complaint: Sore Throat; Fall/Elbow Injury  Other systems are as noted in HPI.  Constitutional and vital signs reviewed.      All other systems reviewed and negative except as noted above.    Physical Exam     ED Triage Vitals [03/01/25 0822]   /90   Pulse 77   Resp 22   Temp 98.3 °F (36.8 °C)   Temp src Oral   SpO2 97 %   O2 Device None (Room air)       Current Vitals:   Vital Signs  BP: 154/90  Pulse: 77  Resp: 22  Temp: 98.3 °F (36.8 °C)  Temp src: Oral    Oxygen Therapy  SpO2: 97 %  O2 Device: None (Room air)        Physical Exam  Vitals and nursing note reviewed.   Constitutional:       Appearance: He is well-developed.   HENT:      Head: Normocephalic and atraumatic.      Right Ear: Tympanic membrane and ear canal normal.      Left Ear: Tympanic membrane and ear canal normal.      Nose: Rhinorrhea present.      Mouth/Throat:      Mouth: Mucous membranes are moist.      Tonsils: No tonsillar exudate or tonsillar abscesses. 0 on the right. 0 on the left.      Comments: PND noted  Eyes:      Conjunctiva/sclera: Conjunctivae normal.      Pupils: Pupils are equal, round, and reactive to light.   Cardiovascular:      Rate and Rhythm: Normal rate and regular rhythm.      Heart sounds: Normal heart sounds.   Pulmonary:      Effort: Pulmonary effort is normal.      Breath sounds: Rhonchi present.      Comments: Adventitious lung sounds appreciated to the left lower base.  Musculoskeletal:      Cervical back: Normal range of motion and neck supple.   Skin:     General: Skin is warm.      Capillary Refill: Capillary refill takes less than 2 seconds.      Comments: Ecchymosis appreciated to the distal ulnar process.  Tenderness to palpation to the left elbow.   Patient is able to flex and extend at left elbow.  CMS intact.   Neurological:      General: No focal deficit present.      Mental Status: He is alert.   Psychiatric:         Mood and Affect: Mood normal.           ED Course     Labs Reviewed   RAPID STREP A - Normal                   MDM        69-year-old male with history of hypertension, dyslipidemia and sleep apnea presents today with complaints of sore throat and elbow pain.  Patient states he was on vacation last week and he fell injuring his left elbow.  Patient states he also developed a sore throat with a cough.  Patient states he had leftover amoxicillin and took 4 days worth with some relief.  Patient denies any chest pain or shortness of breath associated with the cough.  Patient states he returned from his vacation last week.  Vital signs: Please see EMR.  Physical exam: Please see exam.  Differential diagnosis: Viral illness, strep throat, fracture, contusion, fall.  XR CHEST PA + LAT CHEST (CPT=71046)    Result Date: 3/1/2025  CONCLUSION:  Nonspecific bronchial wall thickening, but consider bronchitis.    LOCATION:  [facility]   Dictated by (CST): Shaun Cervantes MD on 3/01/2025 at 9:29 AM     Finalized by (CST): Shaun Cervantes MD on 3/01/2025 at 9:29 AM       XR FOREARM (2 VIEWS), LEFT (CPT=73090)    Result Date: 3/1/2025  CONCLUSION:  Olecranon and lower arm tissue swelling consistent with contusion, in the setting of injury. No acute fracture or other acute process.   LOCATION:  [facility]   Dictated by (CST): Shaun Cervantes MD on 3/01/2025 at 9:23 AM     Finalized by (CST): Shaun Cervantes MD on 3/01/2025 at 9:26 AM       XR ELBOW, COMPLETE (MIN 3 VIEWS), LEFT (CPT=73080)    Result Date: 3/1/2025  CONCLUSION:  Olecranon tissue swelling consistent with contusion, in the setting of injury. No acute fracture or other acute process.   LOCATION:  [facility]    Dictated by (CST): Shaun Cervantes MD on 3/01/2025 at 9:21 AM     Finalized by (CST): Shaun Cervantes  MD on 3/01/2025 at 9:23 AM          Recent Results (from the past 24 hours)   Rapid Strep A - ID NOW    Collection Time: 03/01/25  8:29 AM    Specimen: Throat; Other   Result Value Ref Range    Strep A by PCR Negative Negative     Based on physical exam and HPI will diagnose with bronchitis and pharyngitis.  Will treat with a short course of steroid, Tessalon and ProAir.  Patient to follow-up with primary care provider.  ED precautions given.    Medical Decision Making  69-year-old male with history of hypertension, dyslipidemia and sleep apnea presents today with complaints of sore throat and elbow pain.  Patient states he was on vacation last week and he fell injuring his left elbow.  Patient states he also developed a sore throat with a cough.  Patient states he had leftover amoxicillin and took 4 days worth with some relief.  Patient denies any chest pain or shortness of breath associated with the cough.  Patient states he returned from his vacation last week.    Problems Addressed:  Fall, initial encounter: acute illness or injury  Sore throat: acute illness or injury    Amount and/or Complexity of Data Reviewed  Labs: ordered. Decision-making details documented in ED Course.  Radiology: ordered. Decision-making details documented in ED Course.    Risk  OTC drugs.  Prescription drug management.        Disposition and Plan     Clinical Impression:  1. Sore throat    2. Fall, initial encounter    3. Bronchitis    4. Viral pharyngitis         Disposition:  Discharge  3/1/2025  9:45 am    Follow-up:  Earline Rg MD  [address] [facility], [place] [address]  [phone]    In 1 week      Earline Rg MD  [address] [place] [address]  [phone]    In 1 week            Medications Prescribed:  Current Discharge Medication List        START taking these medications    Details   methylPREDNISolone (MEDROL) 4 MG Oral Tablet Therapy Pack Dosepack: take as directed  Qty: 1 each,  Refills: 0      !! benzonatate 100 MG Oral Cap Take 1 capsule (100 mg total) by mouth 3 (three) times daily as needed for cough.  Qty: 30 capsule, Refills: 0       !! - Potential duplicate medications found. Please discuss with provider.              Supplementary Documentation:

## 2025-03-01 NOTE — ED INITIAL ASSESSMENT (HPI)
Presents for sore throat that started a few days ago. Started taking amoxicillin at home, had improvement. Now has developed cough, congestion.

## 2025-03-02 LAB — T4 FREE SERPL-MCNC: 1.3 NG/DL (ref 0.8–1.7)

## 2025-05-06 ENCOUNTER — OFFICE VISIT (OUTPATIENT)
Dept: INTERNAL MEDICINE CLINIC | Facility: CLINIC | Age: 70
End: 2025-05-06
Payer: MEDICARE

## 2025-05-06 VITALS
BODY MASS INDEX: 42.33 KG/M2 | TEMPERATURE: 98 F | DIASTOLIC BLOOD PRESSURE: 60 MMHG | HEART RATE: 79 BPM | OXYGEN SATURATION: 96 % | WEIGHT: 263.38 LBS | HEIGHT: 66 IN | SYSTOLIC BLOOD PRESSURE: 114 MMHG

## 2025-05-06 DIAGNOSIS — R39.198 DECREASED URINE STREAM: ICD-10-CM

## 2025-05-06 PROBLEM — I25.810 CORONARY ARTERY DISEASE INVOLVING CORONARY BYPASS GRAFT OF NATIVE HEART WITHOUT ANGINA PECTORIS: Status: ACTIVE | Noted: 2022-04-14

## 2025-05-06 PROBLEM — E78.00 PURE HYPERCHOLESTEROLEMIA: Status: ACTIVE | Noted: 2022-04-14

## 2025-05-06 PROCEDURE — 99397 PER PM REEVAL EST PAT 65+ YR: CPT | Performed by: INTERNAL MEDICINE

## 2025-05-06 RX ORDER — FLUTICASONE PROPIONATE 50 MCG
2 SPRAY, SUSPENSION (ML) NASAL DAILY
COMMUNITY
Start: 2025-03-25

## 2025-05-06 RX ORDER — TAMSULOSIN HYDROCHLORIDE 0.4 MG/1
0.4 CAPSULE ORAL DAILY
Qty: 90 CAPSULE | Refills: 3 | Status: SHIPPED | OUTPATIENT
Start: 2025-05-06 | End: 2025-05-06

## 2025-05-06 RX ORDER — FUROSEMIDE 20 MG/1
20 TABLET ORAL DAILY
Qty: 90 TABLET | Refills: 3 | Status: SHIPPED | OUTPATIENT
Start: 2025-05-06

## 2025-05-06 RX ORDER — TAMSULOSIN HYDROCHLORIDE 0.4 MG/1
0.4 CAPSULE ORAL DAILY
Qty: 90 CAPSULE | Refills: 3 | Status: SHIPPED | OUTPATIENT
Start: 2025-05-06

## 2025-05-06 NOTE — PATIENT INSTRUCTIONS
1.  Recently, you have added some weight and I think it is most likely due to water weight in the body.  Furosemide 20 mg once daily should help decrease that water weight.    2.  Please take tamsulosin 1 tablet a day for enlarged prostate.  It also decreases the blood pressure.    3.  Weight Loss Advice :  A) Eat plant based diet and avoid ultra processed and fast foods.  B) Your portions should be a dinner plate. 1/2 should be vegetables, 1/4 lean protein (chicken, fish, turkey. Tofu), and 1/4 can be carbohydrates.   C)  Your main drink should be water rather than soda or alcohol or sweet coffees  D). Please try to exercise ( brisk walking or jogging) at least 30 minutes five times/week; and do muscle strengthening exercises ( lifting the weight, doing push ups or yoga)  twice a week    E). It is very important to get 7-9 hours of sleep per night

## 2025-05-06 NOTE — PROGRESS NOTES
Subjective:   Arron Westfall is a 69 year old male who presents for Well Adult   Visit.  The patient complains of mild shortness of breath when climbing up and the stairs.  He has gained some weight in the last few months.    Few months ago, the patient had an extensive fall leading to bruising of the left arm.  X-ray did not reveal any fracture.    History/Other:    Chief Complaint Reviewed and Verified  No Further Nursing Notes to   Review  Tobacco Reviewed  Allergies Reviewed  Medications Reviewed    Medical History Reviewed  Surgical History Reviewed  Family History   Reviewed  Social History Reviewed         Tobacco:  He has never smoked tobacco.    Current Medications[1]      Review of Systems:  Pertinent items are noted in HPI.  A comprehensive review of systems was negative.      Objective:   /60 (BP Location: Left arm, Patient Position: Sitting, Cuff Size: large)   Pulse 79   Temp 98.4 °F (36.9 °C) (Temporal)   Ht 5' 6\" (1.676 m)   Wt 263 lb 6.4 oz (119.5 kg)   SpO2 96%   BMI 42.51 kg/m²  Estimated body mass index is 42.51 kg/m² as calculated from the following:    Height as of this encounter: 5' 6\" (1.676 m).    Weight as of this encounter: 263 lb 6.4 oz (119.5 kg).    Wt Readings from Last 6 Encounters:   05/06/25 263 lb 6.4 oz (119.5 kg)   03/01/25 250 lb (113.4 kg)   02/05/25 250 lb (113.4 kg)   12/20/24 250 lb (113.4 kg)   11/26/24 254 lb 9.6 oz (115.5 kg)   11/19/24 254 lb (115.2 kg)      General condition: Not in distress.    HEENT: Atraumatic normocephalic  No cervical or submandibular lymphadenopathy  Chest: Clear to auscultate  Heart: S1-S2 ,no murmur  Abdomen: Soft non tender, no palpable organomegaly  Neurological examination: No facial asymmetry.  No lateralizing neurological signs.  Mental state examination: Good eye to eye contact.  Normal mood and behavior.  Engaged in meaningful conversations.  Extremities:  Normal upper extremities  Legs: Bilateral pedal  edema    [place] Sleepiness Scale: 11    Assessment & Plan:     #1: Hypertension with pedal edema and elevated ejection fraction and 2D echo in 2024, in addition to amlodipine, Lasix 20 mg once daily has been ordered.    #2: Benign prostatic hypertrophy-continue with tamsulosin.  The patient did not take medication after a month of use.  Currently, poor urinary stream and nocturia present.    #3: Prediabetes-would like to continue with lifestyle modifications.    #4: Concern for sleep apnea-in the past, seen study was ordered but patient did not do it.    #5: Bilateral knee arthritis-status post Kenalog injections.    #6: Class III obesity-discussed about exercise and normal exercise associated thrombogenesis.  Also discussed about [place].  Target the weight below 200 in 12 months.  In 3 to 6 months if you could reduce the weight by 15 to 20 pounds, that is very conducive.    #7: Preventative medicine:   The patient declined pneumonia and shingles vaccine.  Colonoscopy : 01/12/2023         No follow-ups on file.    Pepe Escalera MD, 5/6/2025, 1:17 PM         [1]   Current Outpatient Medications   Medication Sig Dispense Refill    fluticasone propionate 50 MCG/ACT Nasal Suspension 2 sprays by Nasal route daily.      famotidine 20 MG Oral Tab Take 1 tablet (20 mg total) by mouth 2 (two) times daily. 180 tablet 3    amLODIPine 10 MG Oral Tab Take 1 tablet (10 mg total) by mouth daily.      aspirin 81 MG Oral Tab Take 1 tablet (81 mg total) by mouth daily. (Patient taking differently: Take 1 tablet (81 mg total) by mouth as needed.)      methylPREDNISolone (MEDROL) 4 MG Oral Tablet Therapy Pack Dosepack: take as directed (Patient not taking: Reported on 5/6/2025) 1 each 0    meclizine 25 MG Oral Tab Take 1 tablet (25 mg total) by mouth 3 (three) times daily as needed for Dizziness. 20 tablet 0    benzonatate 200 MG Oral Cap Take 1 capsule (200 mg total) by mouth 3 (three) times daily as needed for cough.  (Patient not taking: Reported on 5/6/2025) 30 capsule 0    furosemide 20 MG Oral Tab Take 1 tablet (20 mg total) by mouth 2 (two) times daily. (Patient not taking: Reported on 5/6/2025) 30 tablet 0    Tadalafil 10 MG Oral Tab Take 1 tablet (10 mg total) by mouth daily as needed for Erectile Dysfunction. 20 tablet 3    tamsulosin 0.4 MG Oral Cap Take 1 capsule (0.4 mg total) by mouth daily. (Patient not taking: Reported on 5/6/2025) 30 capsule 0    levothyroxine 137 MCG Oral Tab TAKE 1 TABLET BY MOUTH BEFORE BREAKFAST 90 tablet 3    levocetirizine 5 MG Oral Tab Take 1 tablet (5 mg total) by mouth every evening. (Patient not taking: Reported on 5/6/2025)      Melatonin 10 MG Oral Tab Take 10 mg by mouth as needed. (Patient not taking: Reported on 5/6/2025)      MOMETASONE FUROATE 0.1 % External Cream APPLY ONE TIME A DAY    TO AFFECTED AREA FOR 14 DAYS (Patient not taking: Reported on 5/6/2025) 15 g 0

## 2025-07-17 PROBLEM — H43.811 DEGENERATION OF POSTERIOR VITREOUS BODY OF RIGHT EYE: Status: ACTIVE | Noted: 2018-10-15

## 2025-07-17 PROBLEM — I25.810 CORONARY ARTERY DISEASE INVOLVING CORONARY BYPASS GRAFT OF NATIVE HEART WITHOUT ANGINA PECTORIS: Status: ACTIVE | Noted: 2022-04-14

## 2025-07-17 PROBLEM — H25.13 AGE-RELATED NUCLEAR CATARACT OF BOTH EYES: Status: ACTIVE | Noted: 2019-11-18

## 2025-07-17 PROBLEM — R94.39 ABNORMAL CARDIOVASCULAR STRESS TEST: Status: ACTIVE | Noted: 2025-06-26

## 2025-07-28 ENCOUNTER — TELEPHONE (OUTPATIENT)
Dept: INTERNAL MEDICINE CLINIC | Facility: CLINIC | Age: 70
End: 2025-07-28

## 2025-07-28 DIAGNOSIS — R73.03 PRE-DIABETES: Primary | ICD-10-CM

## 2025-07-28 DIAGNOSIS — E03.9 HYPOTHYROIDISM, UNSPECIFIED TYPE: ICD-10-CM

## 2025-08-04 ENCOUNTER — LAB ENCOUNTER (OUTPATIENT)
Dept: LAB | Age: 70
End: 2025-08-04
Attending: INTERNAL MEDICINE

## 2025-08-04 VITALS — WEIGHT: 260 LBS | HEIGHT: 66 IN | BODY MASS INDEX: 41.78 KG/M2

## 2025-08-04 DIAGNOSIS — R73.03 PRE-DIABETES: ICD-10-CM

## 2025-08-04 DIAGNOSIS — E03.9 HYPOTHYROIDISM, UNSPECIFIED TYPE: ICD-10-CM

## 2025-08-04 DIAGNOSIS — R06.09 DYSPNEA ON EXERTION: ICD-10-CM

## 2025-08-04 DIAGNOSIS — G47.33 OBSTRUCTIVE SLEEP APNEA (ADULT) (PEDIATRIC): ICD-10-CM

## 2025-08-04 DIAGNOSIS — R73.03 BORDERLINE DIABETES: ICD-10-CM

## 2025-08-04 DIAGNOSIS — R06.02 SHORTNESS OF BREATH: Primary | ICD-10-CM

## 2025-08-04 DIAGNOSIS — I25.810 CORONARY ATHEROSCLEROSIS OF AUTOLOGOUS VEIN BYPASS GRAFT: ICD-10-CM

## 2025-08-04 DIAGNOSIS — R94.39 ABNORMAL BALLISTOCARDIOGRAM: ICD-10-CM

## 2025-08-04 LAB
ANION GAP SERPL CALC-SCNC: 5 MMOL/L (ref 0–18)
BUN BLD-MCNC: 12 MG/DL (ref 9–23)
CALCIUM BLD-MCNC: 9.4 MG/DL (ref 8.7–10.6)
CHLORIDE SERPL-SCNC: 103 MMOL/L (ref 98–112)
CO2 SERPL-SCNC: 29 MMOL/L (ref 21–32)
CREAT BLD-MCNC: 1.11 MG/DL (ref 0.7–1.3)
EGFRCR SERPLBLD CKD-EPI 2021: 72 ML/MIN/1.73M2 (ref 60–?)
ERYTHROCYTE [DISTWIDTH] IN BLOOD BY AUTOMATED COUNT: 15.1 %
EST. AVERAGE GLUCOSE BLD GHB EST-MCNC: 137 MG/DL (ref 68–126)
FASTING STATUS PATIENT QL REPORTED: YES
GLUCOSE BLD-MCNC: 122 MG/DL (ref 70–99)
HBA1C MFR BLD: 6.4 % (ref ?–5.7)
HCT VFR BLD AUTO: 42.8 % (ref 39–53)
HGB BLD-MCNC: 14 G/DL (ref 13–17.5)
MCH RBC QN AUTO: 26.8 PG (ref 26–34)
MCHC RBC AUTO-ENTMCNC: 32.7 G/DL (ref 31–37)
MCV RBC AUTO: 82 FL (ref 80–100)
OSMOLALITY SERPL CALC.SUM OF ELEC: 285 MOSM/KG (ref 275–295)
PLATELET # BLD AUTO: 286 10(3)UL (ref 150–450)
POTASSIUM SERPL-SCNC: 4.2 MMOL/L (ref 3.5–5.1)
RBC # BLD AUTO: 5.22 X10(6)UL (ref 3.8–5.8)
SODIUM SERPL-SCNC: 137 MMOL/L (ref 136–145)
T4 FREE SERPL-MCNC: 1.5 NG/DL (ref 0.8–1.7)
TSI SER-ACNC: 5.7 UIU/ML (ref 0.55–4.78)
WBC # BLD AUTO: 7.6 X10(3) UL (ref 4–11)

## 2025-08-04 PROCEDURE — 83036 HEMOGLOBIN GLYCOSYLATED A1C: CPT

## 2025-08-04 PROCEDURE — 84439 ASSAY OF FREE THYROXINE: CPT

## 2025-08-04 PROCEDURE — 36415 COLL VENOUS BLD VENIPUNCTURE: CPT

## 2025-08-04 PROCEDURE — 84443 ASSAY THYROID STIM HORMONE: CPT

## 2025-08-04 PROCEDURE — 85027 COMPLETE CBC AUTOMATED: CPT

## 2025-08-04 PROCEDURE — 80048 BASIC METABOLIC PNL TOTAL CA: CPT

## 2025-08-07 ENCOUNTER — HOSPITAL ENCOUNTER (OUTPATIENT)
Dept: INTERVENTIONAL RADIOLOGY/VASCULAR | Facility: HOSPITAL | Age: 70
Discharge: HOME | End: 2025-08-07
Attending: INTERNAL MEDICINE

## (undated) DIAGNOSIS — E78.5 HYPERLIPIDEMIA, UNSPECIFIED HYPERLIPIDEMIA TYPE: ICD-10-CM

## (undated) DIAGNOSIS — E03.9 HYPOTHYROIDISM, UNSPECIFIED TYPE: Primary | ICD-10-CM

## (undated) DIAGNOSIS — E78.2 MIXED HYPERLIPIDEMIA: Primary | ICD-10-CM

## (undated) DEVICE — GOWN,SIRUS,FABRIC-REINFORCED,X-LARGE: Brand: MEDLINE

## (undated) DEVICE — CANNULA SEAL

## (undated) DEVICE — COLUMN DRAPE

## (undated) DEVICE — Device

## (undated) DEVICE — SUTURE MONOCRYL 4-0 PS-2

## (undated) DEVICE — TROCARS: Brand: KII® BLUNT TIP ACCESS SYSTEM

## (undated) DEVICE — TIP COVER ACCESSORY

## (undated) DEVICE — MONOPOLAR CURVED SCISSORS: Brand: ENDOWRIST

## (undated) DEVICE — COVER,MAYO STAND,STERILE: Brand: MEDLINE

## (undated) DEVICE — CADIERE FORCEPS: Brand: ENDOWRIST

## (undated) DEVICE — SPONGE STICK WITH PVP-I: Brand: KENDALL

## (undated) DEVICE — KENDALL SCD EXPRESS SLEEVES, KNEE LENGTH, MEDIUM: Brand: KENDALL SCD

## (undated) DEVICE — 40580 - THE PINK PAD - ADVANCED TRENDELENBURG POSITIONING KIT: Brand: 40580 - THE PINK PAD - ADVANCED TRENDELENBURG POSITIONING KIT

## (undated) DEVICE — SUTURE VLOC 90 2-0 9\" 2145

## (undated) DEVICE — VIOLET BRAIDED (POLYGLACTIN 910), SYNTHETIC ABSORBABLE SUTURE: Brand: COATED VICRYL

## (undated) DEVICE — VISUALIZATION SYSTEM: Brand: CLEARIFY

## (undated) DEVICE — PAD SACRAL SPAN AID

## (undated) DEVICE — BLADELESS OBTURATOR: Brand: WECK VISTA

## (undated) DEVICE — ARM DRAPE

## (undated) DEVICE — LAP CHOLE/APPY CDS-LF: Brand: MEDLINE INDUSTRIES, INC.

## (undated) DEVICE — MEGA SUTURECUT ND: Brand: ENDOWRIST

## (undated) DEVICE — CAUTERY PENCIL

## (undated) DEVICE — SUTURE ETHIBOND EXCEL 0 CT-2

## (undated) DEVICE — STERILE POLYISOPRENE POWDER-FREE SURGICAL GLOVES: Brand: PROTEXIS

## (undated) NOTE — LETTER
07/23/18        [facility]  [address]  [address] [address]      Dear Zeny Santiago,    Our records indicate that you have outstanding lab work and or testing that was ordered for you and has not yet been completed:          Hemoglobin A1C [E]

## (undated) NOTE — LETTER
Date: 9/7/2018    Patient Name: William Mccormack          To Whom it may concern: This letter has been written at the patient's request. The above patient was seen at the [facility] for treatment of a medical condition.     This patient masoodu

## (undated) NOTE — ED AVS SNAPSHOT
Magnolia Koo   MRN: [id number]    Department:  [facility] in [place]   Date of Visit:  12/8/2017           Disclosure     Insurance plans vary and the physician(s) referred by the ER may not be covered by your plan.  Please contac tell this physician (or your personal doctor if your instructions are to return to your personal doctor) about any new or lasting problems. The primary care or specialist physician will see patients referred from the [facility] Emergency Department.  Jesika Chong

## (undated) NOTE — LETTER
17    Patient: Fran Roberson  : 6/15/1955 Visit date: 2017    Dear  Dr. Lee Ann Bullock MD,    Thank you for referring Fran Roberson to my practice. Please find my assessment and plan below. Assessment   No diagnosis found.       Sasha

## (undated) NOTE — LETTER
Date & Time: 6/12/2018, 1:46 PM  Patient: Antonio Contreras  Encounter Provider(s):    Jason Suarez I, DO       To Whom It May Concern:    Ana Kimble was seen and treated in our department on 6/12/2018. He should not return to work until 6/14/18.

## (undated) NOTE — ED AVS SNAPSHOT
Miles Hoang   MRN: [id number]    Department:  [facility] Department in [place]   Date of Visit:  5/20/2018           Disclosure     Insurance plans vary and the physician(s) referred by the ER may not be covered by your plan.  Please contac tell this physician (or your personal doctor if your instructions are to return to your personal doctor) about any new or lasting problems. The primary care or specialist physician will see patients referred from the [facility] Emergency Department.  Shawn Marcial

## (undated) NOTE — LETTER
Date: 6/8/2018    Patient Name: Chidi Glover          To Whom it may concern: This letter has been written at the patient's request. The above patient was seen at the [facility] for treatment of a medical condition.     This patient shou

## (undated) NOTE — LETTER
[facility] [address], [address]  Consent for Procedure/Sedation    Date:        Time:       1.  I authorize the performance upon Arron Westfall the following:cardiac catheterization, left ventricular cineangiography, bilateral s period, the physician will determine when the applicable recovery period ends for purposes of reinstating the Do Not Resuscitate (DNR) order.     Signature of Patient: ____________________________________________________    Signature of person authorized

## (undated) NOTE — LETTER
10/03/17    Patient: Max Carson  : 6/15/1955 Visit date: 10/3/2017    Dear  Dr. Julianne Ramos MD,    Thank you for referring Jamesguerrero Yamileth to my practice. Please find my assessment and plan below.                 Assessment   RLQ abdominal pain  (prima

## (undated) NOTE — MR AVS SNAPSHOT
[facility] [address]  [address] [facility]  [phone]               Thank you for choosing us for your health care visit with Ignacio Palencia MD.  We are glad to serve you and happy to provide you with [phone] (For example, Holter Monitor, Cardiac Stress tests, 2d Echocardiograms)  •[facility] call [phone] usually in [facility]    • Please call our office about any questions regarding your treatment/medicines/tests as a result of today Today's Vital Signs     BP Pulse Temp Height Weight BMI    120/82 mmHg 74 98.2 °F (36.8 °C) (Oral) 66\" 255 lb 41.18 kg/m2         Current Medications          This list is accurate as of: 3/7/17  1:27 PM.  Always use your most recent med list.

## (undated) NOTE — LETTER
10/17/17    Patient: Isra Qureshi  : 6/15/1955 Visit date: 10/17/2017    Dear  Dr. Connie King MD,    Thank you for referring Isra Qureshi to my practice. Please find my assessment and plan below. No physical examination performed today.   Ivelisse Pandya with urinalysis is recommended to exclude cystitis. Please see above for further details regarding the chronic findings.          Dictated by: Yajaira Gaytan MD on 10/09/2017 at 11:09       Approved by: Yajaira Gaytan MD              Assessment   Left

## (undated) NOTE — LETTER
05/05/21        [id number]      Dear Vero Armstrong records indicate that you have outstanding lab work and or testing that was ordered for you and has not yet been completed:  Orders Placed This Encounter

## (undated) NOTE — MR AVS SNAPSHOT
[facility] [address]  [facility]  [phone]               Thank you for choosing us for your health care visit with SHELTON Batres.   We are glad to serve you and happy to provide you authorization, such as [place], please feel free to schedule your appointment immediately. However, if you are unsure about the requirements for authorization, please wait 5-7 days and then contact your physician's office.  At that time, you will •To schedule Imaging or tests at [facility] Scheduling [phone], Go to [facility] ER Building (For example: CT scans, X rays, Ultrasound, MRI)  •Cardiac Testing in ER building Building A second floor Cardiac Testing [phone] (For example:  [employer] Narcotic medications can only be filled in 30 day increments and must be refilled at an office visit only. If your prescription is due for a refill, you may be due for a follow-up appointment.   We cannot refill your maintenance medications at a preventati

## (undated) NOTE — LETTER
17    Patient: Dread Hurtado  : 6/15/1955 Visit date: 2017    Dear  Dr. Bonna Buerger, MD,    Thank you for referring Dread Hurtado to my practice. Please find my assessment and plan below.          Assessment   Bilateral inguinal hernia withou All questions were answered. The patient will follow up on an as-needed basis. Imaging & Referrals   None    Follow Up  Return in about 1 week (around 12/19/2017), or if symptoms worsen or fail to improve.     CRISTOBAL Ordonez

## (undated) NOTE — LETTER
04/05/21        [address] [phone]      Dear Raquel Reyes records indicate that you have outstanding lab work and or testing that was ordered for you and has not yet been completed:2D Echo and Holter Monitor   To